# Patient Record
Sex: MALE | Race: OTHER | NOT HISPANIC OR LATINO | URBAN - METROPOLITAN AREA
[De-identification: names, ages, dates, MRNs, and addresses within clinical notes are randomized per-mention and may not be internally consistent; named-entity substitution may affect disease eponyms.]

---

## 2023-04-23 ENCOUNTER — EMERGENCY (EMERGENCY)
Facility: HOSPITAL | Age: 63
LOS: 1 days | End: 2023-04-23
Attending: EMERGENCY MEDICINE
Payer: MEDICAID

## 2023-04-23 ENCOUNTER — INPATIENT (INPATIENT)
Facility: HOSPITAL | Age: 63
LOS: 4 days | Discharge: HOME | DRG: 270 | End: 2023-04-28
Attending: STUDENT IN AN ORGANIZED HEALTH CARE EDUCATION/TRAINING PROGRAM | Admitting: INTERNAL MEDICINE
Payer: MEDICAID

## 2023-04-23 VITALS
OXYGEN SATURATION: 95 % | TEMPERATURE: 98 F | HEART RATE: 87 BPM | RESPIRATION RATE: 22 BRPM | WEIGHT: 134.04 LBS | HEIGHT: 66 IN

## 2023-04-23 VITALS
OXYGEN SATURATION: 99 % | WEIGHT: 139.99 LBS | RESPIRATION RATE: 16 BRPM | HEART RATE: 76 BPM | TEMPERATURE: 98 F | SYSTOLIC BLOOD PRESSURE: 128 MMHG | HEIGHT: 66 IN | DIASTOLIC BLOOD PRESSURE: 86 MMHG

## 2023-04-23 VITALS
RESPIRATION RATE: 18 BRPM | SYSTOLIC BLOOD PRESSURE: 145 MMHG | HEART RATE: 81 BPM | OXYGEN SATURATION: 100 % | DIASTOLIC BLOOD PRESSURE: 93 MMHG

## 2023-04-23 DIAGNOSIS — I25.10 ATHEROSCLEROTIC HEART DISEASE OF NATIVE CORONARY ARTERY WITHOUT ANGINA PECTORIS: ICD-10-CM

## 2023-04-23 DIAGNOSIS — I21.3 ST ELEVATION (STEMI) MYOCARDIAL INFARCTION OF UNSPECIFIED SITE: ICD-10-CM

## 2023-04-23 LAB
A1C WITH ESTIMATED AVERAGE GLUCOSE RESULT: 7.1 % — HIGH (ref 4–5.6)
ALBUMIN SERPL ELPH-MCNC: 4 G/DL — SIGNIFICANT CHANGE UP (ref 3.3–5)
ALBUMIN SERPL ELPH-MCNC: 4.2 G/DL — SIGNIFICANT CHANGE UP (ref 3.3–5)
ALP SERPL-CCNC: 81 U/L — SIGNIFICANT CHANGE UP (ref 40–120)
ALP SERPL-CCNC: 97 U/L — SIGNIFICANT CHANGE UP (ref 40–120)
ALT FLD-CCNC: 12 U/L — SIGNIFICANT CHANGE UP (ref 10–45)
ALT FLD-CCNC: 17 U/L — SIGNIFICANT CHANGE UP (ref 12–78)
ANION GAP SERPL CALC-SCNC: 12 MMOL/L — SIGNIFICANT CHANGE UP (ref 5–17)
ANION GAP SERPL CALC-SCNC: 7 MMOL/L — SIGNIFICANT CHANGE UP (ref 5–17)
APTT BLD: 34.4 SEC — SIGNIFICANT CHANGE UP (ref 27.5–35.5)
AST SERPL-CCNC: 112 U/L — HIGH (ref 10–40)
AST SERPL-CCNC: 36 U/L — SIGNIFICANT CHANGE UP (ref 15–37)
BASOPHILS # BLD AUTO: 0.05 K/UL — SIGNIFICANT CHANGE UP (ref 0–0.2)
BASOPHILS NFR BLD AUTO: 0.4 % — SIGNIFICANT CHANGE UP (ref 0–2)
BILIRUB SERPL-MCNC: 0.2 MG/DL — SIGNIFICANT CHANGE UP (ref 0.2–1.2)
BILIRUB SERPL-MCNC: 0.3 MG/DL — SIGNIFICANT CHANGE UP (ref 0.2–1.2)
BLD GP AB SCN SERPL QL: NEGATIVE — SIGNIFICANT CHANGE UP
BUN SERPL-MCNC: 12 MG/DL — SIGNIFICANT CHANGE UP (ref 7–23)
BUN SERPL-MCNC: 13 MG/DL — SIGNIFICANT CHANGE UP (ref 7–23)
CALCIUM SERPL-MCNC: 9.2 MG/DL — SIGNIFICANT CHANGE UP (ref 8.4–10.5)
CALCIUM SERPL-MCNC: 9.7 MG/DL — SIGNIFICANT CHANGE UP (ref 8.5–10.1)
CHLORIDE SERPL-SCNC: 102 MMOL/L — SIGNIFICANT CHANGE UP (ref 96–108)
CHLORIDE SERPL-SCNC: 105 MMOL/L — SIGNIFICANT CHANGE UP (ref 96–108)
CHOLEST SERPL-MCNC: 246 MG/DL — HIGH
CK MB BLD-MCNC: 5.9 % — HIGH (ref 0–3.5)
CK MB BLD-MCNC: 9 % — HIGH (ref 0–3.5)
CK MB BLD-MCNC: 9.7 % — HIGH (ref 0–3.5)
CK MB CFR SERPL CALC: 145.3 NG/ML — HIGH (ref 0–6.7)
CK MB CFR SERPL CALC: 151.1 NG/ML — HIGH (ref 0–6.7)
CK MB CFR SERPL CALC: 189.7 NG/ML — HIGH (ref 0–6.7)
CK SERPL-CCNC: 1498 U/L — HIGH (ref 30–200)
CK SERPL-CCNC: 2103 U/L — HIGH (ref 30–200)
CK SERPL-CCNC: 2544 U/L — HIGH (ref 30–200)
CO2 SERPL-SCNC: 20 MMOL/L — LOW (ref 22–31)
CO2 SERPL-SCNC: 24 MMOL/L — SIGNIFICANT CHANGE UP (ref 22–31)
CREAT SERPL-MCNC: 0.77 MG/DL — SIGNIFICANT CHANGE UP (ref 0.5–1.3)
CREAT SERPL-MCNC: 1.1 MG/DL — SIGNIFICANT CHANGE UP (ref 0.5–1.3)
EGFR: 102 ML/MIN/1.73M2 — SIGNIFICANT CHANGE UP
EGFR: 77 ML/MIN/1.73M2 — SIGNIFICANT CHANGE UP
EOSINOPHIL # BLD AUTO: 0.39 K/UL — SIGNIFICANT CHANGE UP (ref 0–0.5)
EOSINOPHIL NFR BLD AUTO: 3.3 % — SIGNIFICANT CHANGE UP (ref 0–6)
ESTIMATED AVERAGE GLUCOSE: 157 MG/DL — HIGH (ref 68–114)
GLUCOSE BLDC GLUCOMTR-MCNC: 140 MG/DL — HIGH (ref 70–99)
GLUCOSE BLDC GLUCOMTR-MCNC: 142 MG/DL — HIGH (ref 70–99)
GLUCOSE BLDC GLUCOMTR-MCNC: 147 MG/DL — HIGH (ref 70–99)
GLUCOSE SERPL-MCNC: 143 MG/DL — HIGH (ref 70–99)
GLUCOSE SERPL-MCNC: 235 MG/DL — HIGH (ref 70–99)
HCT VFR BLD CALC: 41.3 % — SIGNIFICANT CHANGE UP (ref 39–50)
HDLC SERPL-MCNC: 40 MG/DL — LOW
HGB BLD-MCNC: 13.8 G/DL — SIGNIFICANT CHANGE UP (ref 13–17)
IMM GRANULOCYTES NFR BLD AUTO: 0.3 % — SIGNIFICANT CHANGE UP (ref 0–0.9)
INR BLD: 0.97 RATIO — SIGNIFICANT CHANGE UP (ref 0.88–1.16)
LACTATE SERPL-SCNC: 1.4 MMOL/L — SIGNIFICANT CHANGE UP (ref 0.5–2)
LIDOCAIN IGE QN: 160 U/L — SIGNIFICANT CHANGE UP (ref 73–393)
LIPID PNL WITH DIRECT LDL SERPL: 179 MG/DL — HIGH
LYMPHOCYTES # BLD AUTO: 3.87 K/UL — HIGH (ref 1–3.3)
LYMPHOCYTES # BLD AUTO: 32.7 % — SIGNIFICANT CHANGE UP (ref 13–44)
MAGNESIUM SERPL-MCNC: 2 MG/DL — SIGNIFICANT CHANGE UP (ref 1.6–2.6)
MCHC RBC-ENTMCNC: 29.6 PG — SIGNIFICANT CHANGE UP (ref 27–34)
MCHC RBC-ENTMCNC: 33.4 GM/DL — SIGNIFICANT CHANGE UP (ref 32–36)
MCV RBC AUTO: 88.4 FL — SIGNIFICANT CHANGE UP (ref 80–100)
MONOCYTES # BLD AUTO: 0.97 K/UL — HIGH (ref 0–0.9)
MONOCYTES NFR BLD AUTO: 8.2 % — SIGNIFICANT CHANGE UP (ref 2–14)
NEUTROPHILS # BLD AUTO: 6.53 K/UL — SIGNIFICANT CHANGE UP (ref 1.8–7.4)
NEUTROPHILS NFR BLD AUTO: 55.1 % — SIGNIFICANT CHANGE UP (ref 43–77)
NON HDL CHOLESTEROL: 206 MG/DL — HIGH
NRBC # BLD: 0 /100 WBCS — SIGNIFICANT CHANGE UP (ref 0–0)
NT-PROBNP SERPL-SCNC: 233 PG/ML — HIGH (ref 0–125)
PHOSPHATE SERPL-MCNC: 3.4 MG/DL — SIGNIFICANT CHANGE UP (ref 2.5–4.5)
PLATELET # BLD AUTO: 293 K/UL — SIGNIFICANT CHANGE UP (ref 150–400)
POTASSIUM SERPL-MCNC: 3.4 MMOL/L — LOW (ref 3.5–5.3)
POTASSIUM SERPL-MCNC: 3.9 MMOL/L — SIGNIFICANT CHANGE UP (ref 3.5–5.3)
POTASSIUM SERPL-SCNC: 3.4 MMOL/L — LOW (ref 3.5–5.3)
POTASSIUM SERPL-SCNC: 3.9 MMOL/L — SIGNIFICANT CHANGE UP (ref 3.5–5.3)
PROT SERPL-MCNC: 7 G/DL — SIGNIFICANT CHANGE UP (ref 6–8.3)
PROT SERPL-MCNC: 8.2 G/DL — SIGNIFICANT CHANGE UP (ref 6–8.3)
PROTHROM AB SERPL-ACNC: 11.3 SEC — SIGNIFICANT CHANGE UP (ref 10.5–13.4)
RBC # BLD: 4.67 M/UL — SIGNIFICANT CHANGE UP (ref 4.2–5.8)
RBC # FLD: 12.9 % — SIGNIFICANT CHANGE UP (ref 10.3–14.5)
RH IG SCN BLD-IMP: POSITIVE — SIGNIFICANT CHANGE UP
SARS-COV-2 RNA SPEC QL NAA+PROBE: SIGNIFICANT CHANGE UP
SODIUM SERPL-SCNC: 133 MMOL/L — LOW (ref 135–145)
SODIUM SERPL-SCNC: 137 MMOL/L — SIGNIFICANT CHANGE UP (ref 135–145)
TRIGL SERPL-MCNC: 136 MG/DL — SIGNIFICANT CHANGE UP
TROPONIN I, HIGH SENSITIVITY RESULT: 1527 NG/L — HIGH
TROPONIN T, HIGH SENSITIVITY RESULT: 1061 NG/L — HIGH (ref 0–51)
TROPONIN T, HIGH SENSITIVITY RESULT: 1587 NG/L — HIGH (ref 0–51)
TROPONIN T, HIGH SENSITIVITY RESULT: 6523 NG/L — HIGH (ref 0–51)
TSH SERPL-MCNC: 2.56 UIU/ML — SIGNIFICANT CHANGE UP (ref 0.27–4.2)
UFH PPP CHRO-ACNC: 0.24 IU/ML — LOW (ref 0.3–0.7)
WBC # BLD: 11.85 K/UL — HIGH (ref 3.8–10.5)
WBC # FLD AUTO: 11.85 K/UL — HIGH (ref 3.8–10.5)

## 2023-04-23 PROCEDURE — 71045 X-RAY EXAM CHEST 1 VIEW: CPT | Mod: 26,77

## 2023-04-23 PROCEDURE — 96375 TX/PRO/DX INJ NEW DRUG ADDON: CPT

## 2023-04-23 PROCEDURE — 87635 SARS-COV-2 COVID-19 AMP PRB: CPT

## 2023-04-23 PROCEDURE — 92978 ENDOLUMINL IVUS OCT C 1ST: CPT | Mod: 26,LD,78

## 2023-04-23 PROCEDURE — 71045 X-RAY EXAM CHEST 1 VIEW: CPT

## 2023-04-23 PROCEDURE — 93010 ELECTROCARDIOGRAM REPORT: CPT

## 2023-04-23 PROCEDURE — 92941 PRQ TRLML REVSC TOT OCCL AMI: CPT | Mod: LD,78

## 2023-04-23 PROCEDURE — 93306 TTE W/DOPPLER COMPLETE: CPT | Mod: 26

## 2023-04-23 PROCEDURE — 93880 EXTRACRANIAL BILAT STUDY: CPT | Mod: 26

## 2023-04-23 PROCEDURE — 99223 1ST HOSP IP/OBS HIGH 75: CPT | Mod: GC

## 2023-04-23 PROCEDURE — 93458 L HRT ARTERY/VENTRICLE ANGIO: CPT | Mod: 26

## 2023-04-23 PROCEDURE — 33967 INSERT I-AORT PERCUT DEVICE: CPT

## 2023-04-23 PROCEDURE — 99285 EMERGENCY DEPT VISIT HI MDM: CPT | Mod: 25

## 2023-04-23 PROCEDURE — 36415 COLL VENOUS BLD VENIPUNCTURE: CPT

## 2023-04-23 PROCEDURE — 96374 THER/PROPH/DIAG INJ IV PUSH: CPT

## 2023-04-23 PROCEDURE — 84484 ASSAY OF TROPONIN QUANT: CPT

## 2023-04-23 PROCEDURE — 71045 X-RAY EXAM CHEST 1 VIEW: CPT | Mod: 26

## 2023-04-23 PROCEDURE — 85025 COMPLETE CBC W/AUTO DIFF WBC: CPT

## 2023-04-23 PROCEDURE — 99291 CRITICAL CARE FIRST HOUR: CPT

## 2023-04-23 PROCEDURE — 85730 THROMBOPLASTIN TIME PARTIAL: CPT

## 2023-04-23 PROCEDURE — 93567 NJX CAR CTH SPRVLV AORTGRPHY: CPT

## 2023-04-23 PROCEDURE — 99252 IP/OBS CONSLTJ NEW/EST SF 35: CPT

## 2023-04-23 PROCEDURE — 93005 ELECTROCARDIOGRAM TRACING: CPT

## 2023-04-23 PROCEDURE — 80053 COMPREHEN METABOLIC PANEL: CPT

## 2023-04-23 PROCEDURE — 99292 CRITICAL CARE ADDL 30 MIN: CPT

## 2023-04-23 PROCEDURE — 83880 ASSAY OF NATRIURETIC PEPTIDE: CPT

## 2023-04-23 PROCEDURE — 83690 ASSAY OF LIPASE: CPT

## 2023-04-23 PROCEDURE — 99053 MED SERV 10PM-8AM 24 HR FAC: CPT

## 2023-04-23 PROCEDURE — 85610 PROTHROMBIN TIME: CPT

## 2023-04-23 RX ORDER — ASPIRIN/CALCIUM CARB/MAGNESIUM 324 MG
81 TABLET ORAL DAILY
Refills: 0 | Status: DISCONTINUED | OUTPATIENT
Start: 2023-04-24 | End: 2023-04-26

## 2023-04-23 RX ORDER — MORPHINE SULFATE 50 MG/1
4 CAPSULE, EXTENDED RELEASE ORAL ONCE
Refills: 0 | Status: DISCONTINUED | OUTPATIENT
Start: 2023-04-23 | End: 2023-04-23

## 2023-04-23 RX ORDER — TICAGRELOR 90 MG/1
90 TABLET ORAL EVERY 12 HOURS
Refills: 0 | Status: DISCONTINUED | OUTPATIENT
Start: 2023-04-24 | End: 2023-04-27

## 2023-04-23 RX ORDER — SODIUM CHLORIDE 9 MG/ML
1000 INJECTION INTRAMUSCULAR; INTRAVENOUS; SUBCUTANEOUS ONCE
Refills: 0 | Status: COMPLETED | OUTPATIENT
Start: 2023-04-23 | End: 2023-04-23

## 2023-04-23 RX ORDER — HYDROMORPHONE HYDROCHLORIDE 2 MG/ML
0.5 INJECTION INTRAMUSCULAR; INTRAVENOUS; SUBCUTANEOUS ONCE
Refills: 0 | Status: DISCONTINUED | OUTPATIENT
Start: 2023-04-23 | End: 2023-04-23

## 2023-04-23 RX ORDER — FENTANYL CITRATE 50 UG/ML
25 INJECTION INTRAVENOUS ONCE
Refills: 0 | Status: DISCONTINUED | OUTPATIENT
Start: 2023-04-23 | End: 2023-04-23

## 2023-04-23 RX ORDER — SODIUM CHLORIDE 9 MG/ML
3 INJECTION INTRAMUSCULAR; INTRAVENOUS; SUBCUTANEOUS EVERY 8 HOURS
Refills: 0 | Status: DISCONTINUED | OUTPATIENT
Start: 2023-04-23 | End: 2023-04-25

## 2023-04-23 RX ORDER — CHLORHEXIDINE GLUCONATE 213 G/1000ML
1 SOLUTION TOPICAL
Refills: 0 | Status: DISCONTINUED | OUTPATIENT
Start: 2023-04-23 | End: 2023-04-25

## 2023-04-23 RX ORDER — INSULIN LISPRO 100/ML
VIAL (ML) SUBCUTANEOUS AT BEDTIME
Refills: 0 | Status: DISCONTINUED | OUTPATIENT
Start: 2023-04-23 | End: 2023-04-28

## 2023-04-23 RX ORDER — DEXTROSE 50 % IN WATER 50 %
25 SYRINGE (ML) INTRAVENOUS ONCE
Refills: 0 | Status: DISCONTINUED | OUTPATIENT
Start: 2023-04-23 | End: 2023-04-23

## 2023-04-23 RX ORDER — ASPIRIN/CALCIUM CARB/MAGNESIUM 324 MG
162 TABLET ORAL ONCE
Refills: 0 | Status: COMPLETED | OUTPATIENT
Start: 2023-04-23 | End: 2023-04-23

## 2023-04-23 RX ORDER — BNT162B2 ORIGINAL AND OMICRON BA.4/BA.5 .1125; .1125 MG/2.25ML; MG/2.25ML
0.3 INJECTION, SUSPENSION INTRAMUSCULAR ONCE
Refills: 0 | Status: DISCONTINUED | OUTPATIENT
Start: 2023-04-23 | End: 2023-04-28

## 2023-04-23 RX ORDER — NITROGLYCERIN 6.5 MG
5 CAPSULE, EXTENDED RELEASE ORAL
Qty: 50 | Refills: 0 | Status: DISCONTINUED | OUTPATIENT
Start: 2023-04-23 | End: 2023-04-23

## 2023-04-23 RX ORDER — POTASSIUM CHLORIDE 20 MEQ
10 PACKET (EA) ORAL ONCE
Refills: 0 | Status: COMPLETED | OUTPATIENT
Start: 2023-04-23 | End: 2023-04-23

## 2023-04-23 RX ORDER — EPTIFIBATIDE 2 MG/ML
2 INJECTION, SOLUTION INTRAVENOUS
Qty: 75 | Refills: 0 | Status: DISCONTINUED | OUTPATIENT
Start: 2023-04-23 | End: 2023-04-24

## 2023-04-23 RX ORDER — METOPROLOL TARTRATE 50 MG
12.5 TABLET ORAL
Refills: 0 | Status: DISCONTINUED | OUTPATIENT
Start: 2023-04-23 | End: 2023-04-28

## 2023-04-23 RX ORDER — HEPARIN SODIUM 5000 [USP'U]/ML
3800 INJECTION INTRAVENOUS; SUBCUTANEOUS ONCE
Refills: 0 | Status: COMPLETED | OUTPATIENT
Start: 2023-04-23 | End: 2023-04-23

## 2023-04-23 RX ORDER — ONDANSETRON 8 MG/1
4 TABLET, FILM COATED ORAL ONCE
Refills: 0 | Status: COMPLETED | OUTPATIENT
Start: 2023-04-23 | End: 2023-04-23

## 2023-04-23 RX ORDER — TICAGRELOR 90 MG/1
180 TABLET ORAL ONCE
Refills: 0 | Status: COMPLETED | OUTPATIENT
Start: 2023-04-23 | End: 2023-04-23

## 2023-04-23 RX ORDER — DEXTROSE 50 % IN WATER 50 %
12.5 SYRINGE (ML) INTRAVENOUS ONCE
Refills: 0 | Status: DISCONTINUED | OUTPATIENT
Start: 2023-04-23 | End: 2023-04-23

## 2023-04-23 RX ORDER — HEPARIN SODIUM 5000 [USP'U]/ML
INJECTION INTRAVENOUS; SUBCUTANEOUS
Qty: 25000 | Refills: 0 | Status: DISCONTINUED | OUTPATIENT
Start: 2023-04-23 | End: 2023-04-26

## 2023-04-23 RX ORDER — ATORVASTATIN CALCIUM 80 MG/1
80 TABLET, FILM COATED ORAL AT BEDTIME
Refills: 0 | Status: DISCONTINUED | OUTPATIENT
Start: 2023-04-23 | End: 2023-04-28

## 2023-04-23 RX ORDER — INSULIN LISPRO 100/ML
VIAL (ML) SUBCUTANEOUS
Refills: 0 | Status: DISCONTINUED | OUTPATIENT
Start: 2023-04-23 | End: 2023-04-28

## 2023-04-23 RX ORDER — HEPARIN SODIUM 5000 [USP'U]/ML
750 INJECTION INTRAVENOUS; SUBCUTANEOUS
Qty: 25000 | Refills: 0 | Status: DISCONTINUED | OUTPATIENT
Start: 2023-04-23 | End: 2023-04-23

## 2023-04-23 RX ADMIN — Medication 12.5 MILLIGRAM(S): at 13:43

## 2023-04-23 RX ADMIN — FENTANYL CITRATE 25 MICROGRAM(S): 50 INJECTION INTRAVENOUS at 17:59

## 2023-04-23 RX ADMIN — SODIUM CHLORIDE 1000 MILLILITER(S): 9 INJECTION INTRAMUSCULAR; INTRAVENOUS; SUBCUTANEOUS at 03:50

## 2023-04-23 RX ADMIN — MORPHINE SULFATE 4 MILLIGRAM(S): 50 CAPSULE, EXTENDED RELEASE ORAL at 04:15

## 2023-04-23 RX ADMIN — TICAGRELOR 180 MILLIGRAM(S): 90 TABLET ORAL at 04:05

## 2023-04-23 RX ADMIN — HYDROMORPHONE HYDROCHLORIDE 0.5 MILLIGRAM(S): 2 INJECTION INTRAMUSCULAR; INTRAVENOUS; SUBCUTANEOUS at 04:17

## 2023-04-23 RX ADMIN — HEPARIN SODIUM 750 UNIT(S)/HR: 5000 INJECTION INTRAVENOUS; SUBCUTANEOUS at 04:13

## 2023-04-23 RX ADMIN — MORPHINE SULFATE 4 MILLIGRAM(S): 50 CAPSULE, EXTENDED RELEASE ORAL at 04:00

## 2023-04-23 RX ADMIN — CHLORHEXIDINE GLUCONATE 1 APPLICATION(S): 213 SOLUTION TOPICAL at 21:17

## 2023-04-23 RX ADMIN — Medication 12.5 MILLIGRAM(S): at 21:16

## 2023-04-23 RX ADMIN — ONDANSETRON 4 MILLIGRAM(S): 8 TABLET, FILM COATED ORAL at 03:45

## 2023-04-23 RX ADMIN — EPTIFIBATIDE 9.73 MICROGRAM(S)/KG/MIN: 2 INJECTION, SOLUTION INTRAVENOUS at 21:17

## 2023-04-23 RX ADMIN — HEPARIN SODIUM 3800 UNIT(S): 5000 INJECTION INTRAVENOUS; SUBCUTANEOUS at 04:13

## 2023-04-23 RX ADMIN — HEPARIN SODIUM 7.5 UNIT(S)/HR: 5000 INJECTION INTRAVENOUS; SUBCUTANEOUS at 09:53

## 2023-04-23 RX ADMIN — SODIUM CHLORIDE 3 MILLILITER(S): 9 INJECTION INTRAMUSCULAR; INTRAVENOUS; SUBCUTANEOUS at 06:32

## 2023-04-23 RX ADMIN — Medication 162 MILLIGRAM(S): at 03:45

## 2023-04-23 RX ADMIN — Medication 1.5 MICROGRAM(S)/MIN: at 15:57

## 2023-04-23 RX ADMIN — Medication 10 MILLIEQUIVALENT(S): at 13:43

## 2023-04-23 RX ADMIN — FENTANYL CITRATE 25 MICROGRAM(S): 50 INJECTION INTRAVENOUS at 18:15

## 2023-04-23 RX ADMIN — MORPHINE SULFATE 4 MILLIGRAM(S): 50 CAPSULE, EXTENDED RELEASE ORAL at 03:45

## 2023-04-23 RX ADMIN — ATORVASTATIN CALCIUM 80 MILLIGRAM(S): 80 TABLET, FILM COATED ORAL at 21:17

## 2023-04-23 NOTE — CONSULT NOTE ADULT - ASSESSMENT
62M NKDA PMH of CAD with 1 stent in 2013 to LAD and DM2, HLD  presented to [facility] after experiencing intermittent substernal chest pain starting at noon on 4/22/23 that he described as a squeezing type pain that would be alleviated with rest.   Was able to go to work in the afternoon (patient works at [employer]) and completed his shift with some chest pain throughout.  When he arrived home around midnight his pain began to worsen without relief at which point he had his wife take him to [facility].  In ED found with STEMI with KASEY V1,V2,V3 with +CE. ASA and Brilinta loaded, started on heparin gtt and transferred to [facility] for LHC.  Cath from this morning revealed severe triple vessel disease (OM1, pLAD, and Cx disease) with vgram 20-25%. IABP placed and patient admitted to CICU.   Currently patient is chest pain free.  No shortness of breath.  No abdominal pain/nausea vomiting.  No numbness/tingling/headache.    Has no metallic implants/PPM/AICD.  He denies hx of chemo/radiation to sternum.   Has not had LE vein surgery/stripping/intervention for varicose veins.    Given cath findings in setting of STEMI CT Surgery Consult /w Dr. Garcia was placed.  CABG evaluation now in progress.  Will obtain pre-op CABG labwork/imaging/diagnostics.        -Preop CABG workup in progress.  Maintain IABP   TTE/Carotid Dopplers ordered/PFT's ordered    Continue Heparin gtt  Patient was Brilinta loaded would hold further P2y12 inhibitors and check level  Would obtain CBC/CMP/Coags/T&Sx2 /TFT's/MRSA PCR and UA  Continue Statin   Continue ASA 81mg   Dr. Garcia to review all imaging.  OR date to be determined   Continue rest of care per CICU

## 2023-04-23 NOTE — H&P ADULT - NSHPPHYSICALEXAM_GEN_ALL_CORE
PHYSICAL EXAM:  Constitutional: Patient laying in bed, NAD  Head: Atraumatic, normocephalic  Eyes: No scleral icterus. PERRLA. EOMI  ENMT: Moist mucous membrane. Uvula midline  Neck: Supple, No JVD  Respiratory: CTA B/L. No wheezes, rales or rhonchi   Cardiovascular: S1/S2. No murmurs, rubs, or gallops.  Gastrointestinal: Nondistended, BSx4, soft, nontender.  Extremities: Moves all extremities. Warm, no edema, nontender. R IABP dressing c/d/i, soft and nontender surrounding   Vascular: 2+ DP/PT pulses B/L   Neurological: A&Ox3. Follows commands. No focal deficits.   Skin: No rashes  on exposed skin

## 2023-04-23 NOTE — ED PROVIDER NOTE - PHYSICAL EXAMINATION
Gen: Alert, appears to be uncomfortable  Head/eyes: NC/AT, PERRL  ENT: airway patent  Neck: supple  Pulm/lung: Bilateral clear BS  CV/heart: RRR  GI/Abd: soft, NT/ND, +BS, no guarding/rebound tenderness  Musculoskeletal: no edema/erythema/cyanosis  Skin: no rash  Neuro: AAOx3, grossly intact

## 2023-04-23 NOTE — PROVIDER CONTACT NOTE (CRITICAL VALUE NOTIFICATION) - PERSON GIVING RESULT:
-Push fluids  -Cool mist humidifier at night  -Flonase  -Mucinex  -Chloraseptic spray OUTC  -GO to ER with worsening symptoms.       Self-Care for Sore Throats    Sore throats happen for many reasons, such as colds, allergies, and infections caused by virus or bacteria from spreading. · Don’t strain your vocal cords.   Call your healthcare provider  Contact your healthcare provider if you have:  · A temperature over 101°F (38.3°C)  · White spots on the throat  · Great difficulty swallowing  · Trouble breathin Laboratory- [facility]

## 2023-04-23 NOTE — H&P ADULT - HISTORY OF PRESENT ILLNESS
62M PMH of CAD with 1 stent in 2013 to LAD and DM2 p/w chest pain since noon has been getting progressively worse tonight took 2 baby aspirin's around midnight.  Pain is nonradiating severe.  Denies any shortness of breath fever chills upper respiratory symptoms. In ED found with STEMI with KASEY V1,V2,V3 with +CE. ASA and Brilinta loaded, started on heparin gtt and transferred to [facility] for LHC.    In Cath lab found with severe triple vessel disease (OM1, pLAD, and Cx disease) with vgram 20-25%. IABP placed.    62M PMH of CAD with 1 stent in 2013 to LAD and DM2 p/w intermittent chest pain which was alleviated by resting and sitting down since noon. States CP has been getting progressively worse around midnight which became persistent, patient took 2 baby ASA, and went to the ED when unable to sleep 2/2 pain. States the pain was 9/10, described as squeezing substernal CP.  Denies any shortness of breath fever chills upper respiratory symptoms. In ED found with STEMI with KASEY V1,V2,V3 with +CE. ASA and Brilinta loaded, started on heparin gtt and transferred to [facility] for LHC.    In Cath lab found with severe triple vessel disease (OM1, pLAD, and Cx disease) with vgram 20-25%. IABP placed. States now CP improved, mild chest pressure 1/10.    62M PMH of CAD with 1 stent in 2013 to LAD and DM2 p/w intermittent chest pain which was alleviated by resting and sitting down since noon. States CP has been getting progressively worse around midnight which became persistent, patient took 2 baby ASA, and went to the ED when unable to sleep 2/2 pain. States the pain was 9/10, described as squeezing substernal CP.  Denies any shortness of breath fever chills upper respiratory symptoms. In ED found with STEMI with KASEY V1,V2,V3 with +CE. ASA and Brilinta loaded, started on heparin gtt and transferred to [facility] for LHC.    In Cath lab found with severe triple vessel disease (OM1, pLAD, and Cx disease) with vgram 20-25%. IABP placed. States now CP improved, mild chest pressure 1/10.     Of note patient states has not seen physician in a few years. Recently stopped his simvastatin 1 week ago himself in which prior he was intermittently taking 20mg.

## 2023-04-23 NOTE — ED PROVIDER NOTE - CLINICAL SUMMARY MEDICAL DECISION MAKING FREE TEXT BOX
62-year-old male history of CAD with 1 stent complaining about chest pain since 12 noon has been getting progressively worse tonight took 2 baby aspirin's around midnight.  Pain is nonradiating severe.  Denies any shortness of breath fever chills upper respiratory symptoms.    STEMI on ekg, labs, ASA, brillinta, heparin loading, transfer to [facility] for immediate cath

## 2023-04-23 NOTE — H&P ADULT - NSHPSOCIALHISTORY_GEN_ALL_CORE
Patient lives at home with wife and children. Works as a . No issues with ADLs at home. Denies any ETOH consumption or drug use. States currently smokes 4-5 cigs as day for last 25 years.

## 2023-04-23 NOTE — ED PROVIDER NOTE - OBJECTIVE STATEMENT
62-year-old male history of CAD with 1 stent complaining about chest pain since 12 noon has been getting progressively worse tonight took 2 baby aspirin's around midnight.  Pain is nonradiating severe.  Denies any shortness of breath fever chills upper respiratory symptoms.

## 2023-04-23 NOTE — ED ADULT NURSE NOTE - OBJECTIVE STATEMENT
Patient came in to ED c/o chest pain started at 1pm yesterday and gotten worse tonight. Patient states he took 2 baby aspirin around 1 AM.

## 2023-04-23 NOTE — H&P ADULT - CRITICAL CARE ATTENDING COMMENT
Given cath findings in setting of STEMI CT Surgery Consult /w Dr. Garcia was placed.    IABP for coronary perfusion  CABG evaluation now in progress.    Will obtain pre-op CABG labwork/imaging/diagnostics.      Maintain IABP - c/w hep gtt  TTE is pending  Carotid Dopplers ordered/PFT's ordered    Patient was Brilinta loaded would hold further P2y12 inhibitors and check level  Would obtain CBC/CMP/Coags/T&Sx2 /TFT's/MRSA PCR and UA  Continue Statin   Continue ASA 81mg   Dr. Garcia to review all imaging.  OR date to be determined     Patient is critically ill, requiring critical care services. Despite the current condition, the patient is at a high risk of clinical and hemodynamic demise Given cath findings in setting of STEMI CT Surgery Consult /w Dr. Garcia was placed.    IABP for coronary perfusion  CABG evaluation now in progress.    Will obtain pre-op CABG lab work/imaging/diagnostics.      Maintain IABP - c/w hep gtt  TTE is pending  Carotid Dopplers ordered/PFT's ordered    Patient was Brilinta loaded would hold further P2y12 inhibitors and check level  Would obtain CBC/CMP/Coags/T&Sx2 /TFT's/MRSA PCR and UA  Continue Statin   Continue ASA 81mg   Dr. Garcia to review all imaging.  OR date to be determined - pt does not want CABG    Patient is critically ill, requiring critical care services. Despite the current condition, the patient is at a high risk of clinical and hemodynamic demise

## 2023-04-23 NOTE — H&P ADULT - NSHPREVIEWOFSYSTEMS_GEN_ALL_CORE
REVIEW OF SYSTEMS:    CONSTITUTIONAL: No weakness, fevers or chills  EYES/ENT: No visual changes;  No vertigo or throat pain   NECK: No pain or stiffness  RESPIRATORY: No cough, wheezing, hemoptysis; No shortness of breath  CARDIOVASCULAR: SEE HPI. Currently No chest pain or palpitations  GASTROINTESTINAL: No abdominal or epigastric pain. No nausea, vomiting, or hematemesis; No diarrhea or constipation. No melena or hematochezia.  GENITOURINARY: No dysuria, frequency or hematuria  NEUROLOGICAL: No numbness or weakness  SKIN: No itching, burning, rashes, or lesions   All other review of systems is negative unless indicated above.

## 2023-04-23 NOTE — ED PROVIDER NOTE - PROGRESS NOTE DETAILS
Discussed case with interventional cardiology fellow Dr. Rivera .Dr. Jerson fagan will take straight to Cath Lab recommend 180 mg Brilinta and heparin drip and bolus.

## 2023-04-23 NOTE — CONSULT NOTE ADULT - NS ATTEND AMEND GEN_ALL_CORE FT
Mr. Ramirez is a 62 year old male with known history of CAD and prior stenting who presented to an OSH early this AM with CP since 12pm yesterday.  He received Brilinta load there and was transferred here and underwent cardiac catheterization which demonstrated an extremely tight LAD lesion proximal to his prior stent and additional lesions in the left circumflex.  He was chest pain free at the time and an IABP was placed given his high risk anatomy and taken to the CICU for possible CABG evaluation.  His EF on echo is approximately 30% although his apex and and lateral lateral wall are largely akinetic with swirling on Definity images.  Upon revisiting the patient later today, he reports that he had been pain free since transfer from [facility] but now endorsed some chest burning and tightness.  I requested repeat troponins which are uptrending and concerning for ongoing ischemia.  While he is increased risk for bypass surgery given his LV dysfunction and recent plavix load, he has reasonable distal coronary targets and appears to have ongoing ischemia so revascularization at this time would be beneficial and emergent CABG was offered to the patient.  I discussed this with the patient, his brother and his wife and the patient was not interested in pursuing surgery and would prefer stenting despite understanding that it was also a complex and high risk stenting given the location of the lesion.  I discussed this with Dr. Arthur and Dr. Irving and Dr. Arthur will follow-up with patient regarding his stent options.

## 2023-04-23 NOTE — PROVIDER CONTACT NOTE (CHANGE IN STATUS NOTIFICATION) - RECOMMENDATIONS
Titrate nitro gtt per protocol to pain. Wait for cath lab to call for report to take patient down for high risk PCI
Continue nitro gtt as tolerated. Give fentanyl for increased pain. Waiting for cath lab to call for report to take patient down for high risk PCI
Start nitro gtt, initiate cath lab for high risk PCI

## 2023-04-23 NOTE — CONSULT NOTE ADULT - PROBLEM SELECTOR RECOMMENDATION 9
+ STEMI, cath this morning /w severe triple vessel disease (OM1, pLAD, and Cx disease) with vgram 20-25%.    -Preop CABG workup in progress.  Maintain IABP   TTE/Carotid Dopplers ordered/PFT's ordered    Continue Heparin gtt  Patient was Brilinta loaded would hold further P2y12 inhibitors and check level  Would obtain CBC/CMP/Coags/T&Sx2 /TFT's/MRSA PCR and UA  Continue Statin   Continue ASA 81mg   Dr. Garcia to review all imaging.  OR date to be determined   Continue rest of care per CICU

## 2023-04-23 NOTE — PROVIDER CONTACT NOTE (CHANGE IN STATUS NOTIFICATION) - SITUATION
Pt has unrelieved 8/10 CP
Pt reporting chest pain
Pt complaining of constant 7/10 chest pain unrelieved by nitro gtt or IABP management

## 2023-04-23 NOTE — PATIENT PROFILE ADULT - FALL HARM RISK - RISK INTERVENTIONS

## 2023-04-23 NOTE — CONSULT NOTE ADULT - SUBJECTIVE AND OBJECTIVE BOX
HPI:  62M NKDA PMH of CAD with 1 stent in 2013 to LAD and DM2, HLD  presented to [place] ED after experiencing intermittent substernal chest pain starting at noon on 4/22/23 that he described as a squeezing type pain that would be alleviated with rest.   Was able to go to work in the afternoon (patient works at [employer]) and completed his shift with some chest pain throughout.  When he arrived home around midnight his pain began to worsen without relief at which point he had his wife take him to [place].  In ED found with STEMI with KASEY V1,V2,V3 with +CE. ASA and Brilinta loaded, started on heparin gtt and transferred to [facility] for LHC.  Cath from this morning revealed severe triple vessel disease (OM1, pLAD, and Cx disease) with vgram 20-25%. IABP placed and patient admitted to CICU.   Currently patient is chest pain free.  No shortness of breath.  No abdominal pain/nausea vomiting.  No numbness/tingling/headache.    Has no metallic implants/PPM/AICD.  He denies hx of chemo/radiation to sternum.   Has not had LE vein surgery/stripping/intervention for varicose veins.       Past Medical History  CAD (coronary artery disease)    Diabetes      Past Surgical History  No significant past surgical history      MEDICATIONS  (STANDING):  atorvastatin 80 milliGRAM(s) Oral at bedtime  chlorhexidine 4% Liquid 1 Application(s) Topical <User Schedule>  coronavirus bivalent (EUA) Booster Vaccine (PFIZER) 0.3 milliLiter(s) IntraMuscular once  dextrose 50% Injectable 12.5 Gram(s) IV Push once  dextrose 50% Injectable 25 Gram(s) IV Push once  dextrose 50% Injectable 25 Gram(s) IV Push once  insulin lispro (ADMELOG) corrective regimen sliding scale   SubCutaneous at bedtime  insulin lispro (ADMELOG) corrective regimen sliding scale   SubCutaneous three times a day before meals  sodium chloride 0.9% lock flush 3 milliLiter(s) IV Push every 8 hours    MEDICATIONS  (PRN):      Vital Signs Last 24 Hrs  T(C): 36.6 (04-23-23 @ 05:45), Max: 36.6 (04-23-23 @ 05:45)  T(F): 97.9 (04-23-23 @ 05:45), Max: 97.9 (04-23-23 @ 05:45)  HR: 87 (04-23-23 @ 06:00) (76 - 87)  BP: 145/93 (04-23-23 @ 04:10) (127/83 - 145/93)  RR: 23 (04-23-23 @ 06:00) (16 - 23)  SpO2: 96% (04-23-23 @ 06:00) (95% - 100%)           Daily Height in cm: 167.64 (23 Apr 2023 05:45)    Daily   Admit Wt: Drug Dosing Weight  Height (cm): 167.6 (23 Apr 2023 05:45)  Weight (kg): 60.8 (23 Apr 2023 05:45)  BMI (kg/m2): 21.6 (23 Apr 2023 05:45)  BSA (m2): 1.69 (23 Apr 2023 05:45)    Allergies: No Known Allergies      SOCIAL HISTORY:  Smoker: [ ] Yes  [X] No   Former smoker   ETOH use: [ ] Yes  [X] No             Pt denies  Ilicit Drug use:  [ ] Yes  [X] No     Pt denies    FAMILY HISTORY:  FH: CAD (coronary artery disease) (Father)    FH: type 2 diabetes (Mother, Sibling)        Review of Systems  GENERAL:  no weakness, fatigue, fevers or chills  NEURO: no dizziness, numbness, tingling or weakness  SKIN: no itching, burning, rashes, or lesions   HEENT: no visual changes;  no headache, no vertigo, no recent colds  RESPIRATORY: no shortness of breath, no cough, sputum, wheezing  CARDIOVASCULAR:  +intermittent substernal chest pain currently chest pain free  GI: no abd pain. no N/V/D.  PERIPHERAL VASCULAR: no swelling, no tenderness, no erythema    PHYSICAL EXAM  Tele NSR 90s   General: Well nourished, well developed, NAD.                                              Neuro: Normal exam oriented to person/place & time with no focal motor or sensory  deficits.                    Eyes: Normal exam of conjunctiva & lids, pupils equally reactive.   ENT: Normal exam of nasal/oral mucosa with absence of cyanosis.   Neck: Normal exam of jugular veins, trachea & thyroid.   Chest: Normal lung exam with good air movement absence of wheezes, rales, or rhonchi.                                                                         CV:  Auscultation: normal S1S2, RRR   Carotids: No Bruits[X]  Abdominal Aorta: normal [X] nonpalpable[X]                                                                         GI: Normal exam of abdomen with no noted masses or tenderness. +BSx4Q                                                                                            Extremities: Normal no evidence of cyanosis or deformity, Edema: none  Lower Extremity Pulses: Right femoral IABP in place.  No s/s bleeding/hematoma at sheath/insertion site. Right[+2DP] Left[+2DP] Varicosities[none]  SKIN : Normal exam to inspection & palpation.                                                           LABS:                        13.8   11.85 )-----------( 293      ( 23 Apr 2023 03:40 )             41.3     04-23    133<L>  |  102  |  13  ----------------------------<  235<H>  3.4<L>   |  24  |  1.10    Ca    9.7      23 Apr 2023 03:40    TPro  8.2  /  Alb  4.0  /  TBili  0.2  /  DBili  x   /  AST  36  /  ALT  17  /  AlkPhos  97  04-23    PT/INR - ( 23 Apr 2023 03:40 )   PT: 11.3 sec;   INR: 0.97 ratio         PTT - ( 23 Apr 2023 03:40 )  PTT:34.4 sec      Cardiac Cath:    TTE / ALEJANDRA:   HPI:  62M NKDA PMH of CAD with 1 stent in  to LAD and DM2, HLD  presented to [place] ED after experiencing intermittent substernal chest pain starting at noon on 23 that he described as a squeezing type pain that would be alleviated with rest.   Was able to go to work in the afternoon (patient works at [employer]) and completed his shift with some chest pain throughout.  When he arrived home around midnight his pain began to worsen without relief at which point he had his wife take him to [place].  In ED found with STEMI with KASEY V1,V2,V3 with +CE. ASA and Brilinta loaded, started on heparin gtt and transferred to [facility] for LHC.  Cath from this morning revealed severe triple vessel disease (OM1, pLAD, and Cx disease) with vgram 20-25%. IABP placed and patient admitted to CICU.   Currently patient is chest pain free.  No shortness of breath.  No abdominal pain/nausea vomiting.  No numbness/tingling/headache.    Has no metallic implants/PPM/AICD.  He denies hx of chemo/radiation to sternum.   Has not had LE vein surgery/stripping/intervention for varicose veins.       Past Medical History  CAD (coronary artery disease)    Diabetes      Past Surgical History  No significant past surgical history      MEDICATIONS  (STANDING):  atorvastatin 80 milliGRAM(s) Oral at bedtime  chlorhexidine 4% Liquid 1 Application(s) Topical <User Schedule>  coronavirus bivalent (EUA) Booster Vaccine (PFIZER) 0.3 milliLiter(s) IntraMuscular once  dextrose 50% Injectable 12.5 Gram(s) IV Push once  dextrose 50% Injectable 25 Gram(s) IV Push once  dextrose 50% Injectable 25 Gram(s) IV Push once  insulin lispro (ADMELOG) corrective regimen sliding scale   SubCutaneous at bedtime  insulin lispro (ADMELOG) corrective regimen sliding scale   SubCutaneous three times a day before meals  sodium chloride 0.9% lock flush 3 milliLiter(s) IV Push every 8 hours    MEDICATIONS  (PRN):      Vital Signs Last 24 Hrs  T(C): 36.6 (23 @ 05:45), Max: 36.6 (23 @ 05:45)  T(F): 97.9 (23 @ 05:45), Max: 97.9 (23 @ 05:45)  HR: 87 (23 @ 06:00) (76 - 87)  BP: 145/93 (23 @ 04:10) (127/83 - 145/93)  RR: 23 (23 @ 06:00) (16 - 23)  SpO2: 96% (23 @ 06:00) (95% - 100%)           Daily Height in cm: 167.64 (2023 05:45)    Daily   Admit Wt: Drug Dosing Weight  Height (cm): 167.6 (2023 05:45)  Weight (kg): 60.8 (2023 05:45)  BMI (kg/m2): 21.6 (2023 05:45)  BSA (m2): 1.69 (2023 05:45)    Allergies: No Known Allergies      SOCIAL HISTORY:  Smoker: [ ] Yes  [X] No   Former smoker   ETOH use: [ ] Yes  [X] No             Pt denies  Ilicit Drug use:  [ ] Yes  [X] No     Pt denies    FAMILY HISTORY:  FH: CAD (coronary artery disease) (Father)    FH: type 2 diabetes (Mother, Sibling)        Review of Systems  GENERAL:  no weakness, fatigue, fevers or chills  NEURO: no dizziness, numbness, tingling or weakness  SKIN: no itching, burning, rashes, or lesions   HEENT: no visual changes;  no headache, no vertigo, no recent colds  RESPIRATORY: no shortness of breath, no cough, sputum, wheezing  CARDIOVASCULAR:  +intermittent substernal chest pain currently chest pain free  GI: no abd pain. no N/V/D.  PERIPHERAL VASCULAR: no swelling, no tenderness, no erythema    PHYSICAL EXAM  Tele NSR 90s   General: Well nourished, well developed, NAD.                                              Neuro: Normal exam oriented to person/place & time with no focal motor or sensory  deficits.                    Eyes: Normal exam of conjunctiva & lids, pupils equally reactive.   ENT: Normal exam of nasal/oral mucosa with absence of cyanosis.   Neck: Normal exam of jugular veins, trachea & thyroid.   Chest: Normal lung exam with good air movement absence of wheezes, rales, or rhonchi.                                                                         CV:  Auscultation: normal S1S2, RRR   Carotids: No Bruits[X]  Abdominal Aorta: normal [X] nonpalpable[X]                                                                         GI: Normal exam of abdomen with no noted masses or tenderness. +BSx4Q                                                                                            Extremities: Normal no evidence of cyanosis or deformity, Edema: none  Lower Extremity Pulses: Right femoral IABP in place.  No s/s bleeding/hematoma at sheath/insertion site. Right[+2DP] Left[+2DP] Varicosities[none]  SKIN : Normal exam to inspection & palpation.                                                           LABS:                        13.8   11.85 )-----------( 293      ( 2023 03:40 )             41.3     04-    133<L>  |  102  |  13  ----------------------------<  235<H>  3.4<L>   |  24  |  1.10    Ca    9.7      2023 03:40    TPro  8.2  /  Alb  4.0  /  TBili  0.2  /  DBili  x   /  AST  36  /  ALT  17  /  AlkPhos  97  04-23    PT/INR - ( 2023 03:40 )   PT: 11.3 sec;   INR: 0.97 ratio         PTT - ( 2023 03:40 )  PTT:34.4 sec      Cardiac Cath:    Study Date:     2023   Name:           RENAY FLORIAN   :            1960   (62 years)   Gender:         male   MR#:            [id number]   MPI#:           [id number]   Patient Class:  Inpatient     Cath Lab Report    Diagnostic Cardiologist:       Wilmar Arthur MD   Interventional Cardiologist:   Wilmar Arthur MD   Fellow:                        Keely Zuniga MD   Referring Physician:           Astrid Irving MD     Procedures Performed   Procedures:               1.    Arterial Access - Right Femoral     2.    Diagnostic Coronary Angiography   3.    Ultrasound Guided Access   4.    IABP   5.    Aortogram   6.    Left Heart Cath     Indications:               Acute Coronary Syndrome   Lab Visit Indication:      Acute Coronary Syndrome     Conclusions:     Severe two vessel obstructive coronary artery disease   --Left anterior descending artery and left circumflex artery   Normal left main coronary artery   Right dominant system   No gradient across the aortic valve   EF LVEDP = 28mmHg     Status post placement of an IABP through the right common femoral  artery    ===   Patient chest pain free throughout the entire time he was in the  cardiac catheterization laboratory  Recommendations:     Keep right leg straight while IABP and sheath are in position   Continue heparin drip with close monitoring for signs and symptoms of  bleeding  Continue aggressive medical management of coronary artery disease and  associated risk factors  Recommend patient be evaluated by the cardiac surgery team   Patient to be transferred to CICU for further observation and  management    Findings and plan discussed with CICU/Dr. Irving and cardiac  surgery/Dr. Garcia    Procedure Narrative:     Patient: RENAY FLORIAN           MRN: [id number]  Study Date: 2023   05:01 AM      Page 1 of 4          The risks and alternatives of the procedures and conscious sedation  were explained to the patient and informed consent was  obtained. The patient was brought to the cath lab and placed on the  exam table.  Access   Right femoral artery:   The puncture site was infiltrated with 1% Lidocaine. Vascular access  was obtained using modified seldinger technique.    Intra-aortic Balloon Pump   An intra-aortic balloon pump was inserted.     Diagnostic Findings:     Coronary Angiography   LM   Left main artery: The segment is visually normal in size and  structure.    LAD   Proximal left anterior descending: There is a 90 % stenosis in the  proximal third portion of the segment. Midleft anterior  descending: There is a 20 % in-stent restenosis in the middle third  portion of the segment. Mid left anterior descending: There  is a 70 % stenosis in the distal third portion of the segment. First  diagonal: There is a 20 % stenosis.    CX   Proximal circumflex: There is a 70 % stenosis in the distal third  portion of the segment. First obtuse marginal: There is an 80  % stenosis in the ostium portion of the segment.      RCA   Proximal right coronary artery: The segment is visually normal in size  and structure.    Left Heart Cath   Left ventricular function was assessed. Ejection fraction was visually  estimated with a value of 20%. LV to AO pullback was  performed.          TTE / ALEJANDRA:

## 2023-04-23 NOTE — PROGRESS NOTE ADULT - SUBJECTIVE AND OBJECTIVE BOX
RENAY FLORIAN  MRN-[id number]  Patient is a 62y old  Male who presents with a chief complaint of STEMI, found with TVD (23 Apr 2023 06:22)    HPI:  62M PMH of CAD with 1 stent in 2013 to LAD and DM2 p/w intermittent chest pain which was alleviated by resting and sitting down since noon. States CP has been getting progressively worse around midnight which became persistent, patient took 2 baby ASA, and went to the ED when unable to sleep 2/2 pain. States the pain was 9/10, described as squeezing substernal CP.  Denies any shortness of breath fever chills upper respiratory symptoms. In ED found with STEMI with KASEY V1,V2,V3 with +CE. ASA and Brilinta loaded, started on heparin gtt and transferred to [facility] for LHC.    In Cath lab found with severe triple vessel disease (OM1, pLAD, and Cx disease) with vgram 20-25%. IABP placed. States now CP improved, mild chest pressure 1/10.     Of note patient states has not seen physician in a few years. Recently stopped his simvastatin 1 week ago himself in which prior he was intermittently taking 20mg.  (23 Apr 2023 05:40)      Hospital Course:    24 HOUR EVENTS:    REVIEW OF SYSTEMS:    CONSTITUTIONAL: No weakness, fevers or chills  EYES/ENT: No visual changes;  No vertigo or throat pain   NECK: No pain or stiffness  RESPIRATORY: No cough, wheezing, hemoptysis; No shortness of breath  CARDIOVASCULAR: No chest pain or palpitations  GASTROINTESTINAL: No abdominal or epigastric pain. No nausea, vomiting, or hematemesis; No diarrhea or constipation. No melena or hematochezia.  GENITOURINARY: No dysuria, frequency or hematuria  NEUROLOGICAL: No numbness or weakness  SKIN: No itching, rashes      ICU Vital Signs Last 24 Hrs  T(C): 36.7 (23 Apr 2023 11:00), Max: 36.7 (23 Apr 2023 07:00)  T(F): 98 (23 Apr 2023 11:00), Max: 98.1 (23 Apr 2023 07:00)  HR: 62 (23 Apr 2023 18:00) (61 - 87)  BP: 145/93 (23 Apr 2023 04:10) (127/83 - 145/93)  BP(mean): --  ABP: --  ABP(mean): --  RR: 24 (23 Apr 2023 18:00) (16 - 36)  SpO2: 95% (23 Apr 2023 18:00) (93% - 100%)    O2 Parameters below as of 23 Apr 2023 16:00  Patient On (Oxygen Delivery Method): room air            CVP(mm Hg): --  CO: --  CI: --  PA: --  PA(mean): --  PA(direct): --  PCWP: --  LA: --  RA: --  SVR: --  SVRI: --  PVR: --  PVRI: --  I&O's Summary    23 Apr 2023 07:01  -  23 Apr 2023 19:59  --------------------------------------------------------  IN: 400.5 mL / OUT: 2150 mL / NET: -1749.5 mL        CAPILLARY BLOOD GLUCOSE    CAPILLARY BLOOD GLUCOSE      POCT Blood Glucose.: 140 mg/dL (23 Apr 2023 13:42)      PHYSICAL EXAM:  GENERAL: No acute distress, well-developed  HEAD:  Atraumatic, Normocephalic  EYES: EOMI, PERRLA, conjunctiva and sclera clear  NECK: Supple, no lymphadenopathy, no JVD  CHEST/LUNG: CTAB; No wheezes, rales, or rhonchi  HEART: Regular rate and rhythm. Normal S1/S2. No murmurs, rubs, or gallops  ABDOMEN: Soft, non-tender, non-distended; normal bowel sounds, no organomegaly  EXTREMITIES:  2+ peripheral pulses b/l, No clubbing, cyanosis, or edema  NEUROLOGY: A&O x 3, no focal deficits  SKIN: No rashes or lesions    ============================I/O===========================   I&O's Detail    23 Apr 2023 07:01  -  23 Apr 2023 19:59  --------------------------------------------------------  IN:    Heparin: 67.5 mL    Nitroglycerin: 93 mL    Oral Fluid: 240 mL  Total IN: 400.5 mL    OUT:    Voided (mL): 2150 mL  Total OUT: 2150 mL    Total NET: -1749.5 mL        ============================ LABS =========================                        13.8   11.85 )-----------( 293      ( 23 Apr 2023 03:40 )             41.3     04-23    137  |  105  |  12  ----------------------------<  143<H>  3.9   |  20<L>  |  0.77    Ca    9.2      23 Apr 2023 09:13  Phos  3.4     04-23  Mg     2.0     04-23    TPro  7.0  /  Alb  4.2  /  TBili  0.3  /  DBili  x   /  AST  112<H>  /  ALT  12  /  AlkPhos  81  04-23    Troponin T, High Sensitivity Result: 1587 ng/L (04-23-23 @ 12:04)  Troponin T, High Sensitivity Result: 1061 ng/L (04-23-23 @ 09:13)    CKMB Units: 189.7 ng/mL (04-23-23 @ 12:04)  CKMB Units: 145.3 ng/mL (04-23-23 @ 09:13)    Creatine Kinase, Serum: 2103 U/L (04-23-23 @ 12:04)  Creatine Kinase, Serum: 1498 U/L (04-23-23 @ 09:13)    CPK Mass Assay %: 9.0 % (04-23-23 @ 12:04)  CPK Mass Assay %: 9.7 % (04-23-23 @ 09:13)        LIVER FUNCTIONS - ( 23 Apr 2023 09:13 )  Alb: 4.2 g/dL / Pro: 7.0 g/dL / ALK PHOS: 81 U/L / ALT: 12 U/L / AST: 112 U/L / GGT: x           PT/INR - ( 23 Apr 2023 03:40 )   PT: 11.3 sec;   INR: 0.97 ratio         PTT - ( 23 Apr 2023 03:40 )  PTT:34.4 sec        ======================Micro/Rad/Cardio=================  Telemtry: Reviewed   EKG: Reviewed  CXR: Reviewed  Culture: Reviewed   Echo:   Cath:   ======================================================  PAST MEDICAL & SURGICAL HISTORY:  CAD (coronary artery disease)      Diabetes      No significant past surgical history  ====================ASSESSMENT ==============  62M PMH CAD (YADIRA to LAD '13) and DM p/w CP found with STEMI s.p LHC with TVD and IABP placement.     Plan:  ====================== NEUROLOGY=====================  A&Ox4  - continue to monitor mental status as per protocol     ==================== RESPIRATORY======================  Comfortable on RA  - continue to monitor SpO2 with goal >94%    ====================CARDIOVASCULAR==================  TVD  - s/p C 4/23 with severe CAD to pLAD, CX, OM1. IABP placed for coronary perfusion. Vgram 20-25%  - CTS eval pending, Dr. Marques aware  - TTE ordered  - trend CE until peak  - Start heparin gtt for TVD, IABP  - S/P asa and Brilinta load. C/w asa, hold all P2Y12 inhibitors while CTS eval for CABG. F.u P2Y12 levels  - start high intensity lipitor  - Maintain IABP 1:1 augmentation. Monitor site and peripheral pulses per protocol. Daily CXR while in place  - GDMT as frida      Smoker  - smoking cessesation education ordered  - declining nicotine patch at this time    ===================HEMATOLOGIC/ONC ===================  h/h and plts wnl  - Monitor H/H and plts  - DVT PPX: heparin gtt for TVD, IABP, & DVT PPX    ===================== RENAL =========================  SCr 1.1  - Continue monitoring urine output, lytes, SCr/ BUN  - replete lytes prn with goal K >4 and Mg >2    ==================== GASTROINTESTINAL===================  DASH carb consistent diet as frida  - monitor for regular BM while inpatient    =======================    ENDOCRINE  =====================  DM2  - states intermittently takes 500mg of metformin when eating poorly.   - Start mISS. Monitor FS with goal 100-180. Consider addition of basal/bolus insulin if above goal  - f/u hgb a1c    -f/u TSH and lipid panel       ========================INFECTIOUS DISEASE================  Afebrile, mild leukocytosis   - Leukocytosis likely reactive 2/2 STEMI. No s/s of infection   - monitor and trend WBC and temperature curve     Patient requires continuous monitoring with bedside rhythm monitoring, pulse ox monitoring, and intermittent blood gas analysis. Care plan discussed with ICU care team. Patient remained critical and at risk for life threatening decompensation.  Patient seen, examined and plan discussed with CCU team during rounds.     I have personally provided ____ minutes of critical care time excluding time spent on separate procedures, in addition to initial critical care time provided by the CICU Attending, Dr. Irving.    By signing my name below, I, Nidhi Joy, attest that this documentation has been prepared under the direction and in the presence of Delfina Blanca NP.  Electronically signed: Lorenzo Lugo, 04-23-23 @ 20:00    I, Delfina Blanca, personally performed the services described in this documentation. all medical record entries made by the yordyibe were at my direction and in my presence. I have reviewed the chart and agree that the record reflects my personal performance and is accurate and complete  Electronically signed: Delfina Blanca NP.       RENAY FLORIAN  MRN-[id number]  Patient is a 62y old  Male who presents with a chief complaint of STEMI, found with TVD (23 Apr 2023 06:22)    HPI: 62M PMH of CAD with 1 stent in 2013 to LAD and DM2 p/w intermittent chest pain which was alleviated by resting and sitting down since noon. States CP has been getting progressively worse around midnight which became persistent, patient took 2 baby ASA, and went to the ED when unable to sleep 2/2 pain. States the pain was 9/10, described as squeezing substernal CP.  Denies any shortness of breath fever chills upper respiratory symptoms. In ED found with STEMI with KASEY V1,V2,V3 with +CE. ASA and Brilinta loaded, started on heparin gtt and transferred to [facility] for LHC.    In Cath lab found with severe triple vessel disease (OM1, pLAD, and Cx disease) with vgram 20-25%. IABP placed. States now CP improved, mild chest pressure 1/10.     Of note patient states has not seen physician in a few years. Recently stopped his simvastatin 1 week ago himself in which prior he was intermittently taking 20mg.  (23 Apr 2023 05:40)      REVIEW OF SYSTEMS:  CONSTITUTIONAL: No weakness, fevers or chills  EYES/ENT: No visual changes;  No vertigo or throat pain   NECK: No pain or stiffness  RESPIRATORY: No cough, wheezing, hemoptysis; No shortness of breath  CARDIOVASCULAR: No chest pain or palpitations  GASTROINTESTINAL: No abdominal or epigastric pain  No nausea, vomiting, or hematemesis; No diarrhea or constipation. No melena or hematochezia.  GENITOURINARY: No dysuria, frequency or hematuria  NEUROLOGICAL: No numbness or weakness  SKIN: No itching, rashes    ICU Vital Signs Last 24 Hrs  T(C): 36.7 (23 Apr 2023 11:00), Max: 36.7 (23 Apr 2023 07:00)  T(F): 98 (23 Apr 2023 11:00), Max: 98.1 (23 Apr 2023 07:00)  HR: 62 (23 Apr 2023 18:00) (61 - 87)  BP: 145/93 (23 Apr 2023 04:10) (127/83 - 145/93)  RR: 24 (23 Apr 2023 18:00) (16 - 36)  SpO2: 95% (23 Apr 2023 18:00) (93% - 100%)    O2 Parameters below as of 23 Apr 2023 16:00  Patient On (Oxygen Delivery Method): room air      I&O's Summary    23 Apr 2023 07:01  -  23 Apr 2023 19:59  --------------------------------------------------------  IN: 400.5 mL / OUT: 2150 mL / NET: -1749.5 mL      CAPILLARY BLOOD GLUCOSE  POCT Blood Glucose.: 140 mg/dL (23 Apr 2023 13:42)    PHYSICAL EXAM:  GENERAL: No acute distress, well-developed  HEAD:  Atraumatic, Normocephalic  EYES: EOMI, PERRLA, conjunctiva and sclera clear  NECK: Supple, no lymphadenopathy, no JVD  CHEST/LUNG: CTAB; No wheezes, rales, or rhonchi  HEART: Regular rate and rhythm. Normal S1/S2. No murmurs, rubs, or gallops  ABDOMEN: Soft, non-tender, non-distended; normal bowel sounds, no organomegaly  EXTREMITIES:  2+ peripheral pulses b/l, No clubbing, cyanosis, or edema  NEUROLOGY: A&O x 3, no focal deficits  SKIN: No rashes or lesions  ============================I/O===========================   I&O's Detail    23 Apr 2023 07:01  -  23 Apr 2023 19:59  --------------------------------------------------------  IN:    Heparin: 67.5 mL    Nitroglycerin: 93 mL    Oral Fluid: 240 mL  Total IN: 400.5 mL    OUT:    Voided (mL): 2150 mL  Total OUT: 2150 mL    Total NET: -1749.5 mL  ============================ LABS =========================                     13.8   11.85 )-----------( 293      ( 23 Apr 2023 03:40 )             41.3     04-23    137  |  105  |  12  ----------------------------<  143<H>  3.9   |  20<L>  |  0.77    Ca    9.2      23 Apr 2023 09:13  Phos  3.4     04-23  Mg     2.0     04-23    TPro  7.0  /  Alb  4.2  /  TBili  0.3  /  DBili  x   /  AST  112<H>  /  ALT  12  /  AlkPhos  81  04-23    Troponin T, High Sensitivity Result: 1587 ng/L (04-23-23 @ 12:04)  Troponin T, High Sensitivity Result: 1061 ng/L (04-23-23 @ 09:13)    CKMB Units: 189.7 ng/mL (04-23-23 @ 12:04)  CKMB Units: 145.3 ng/mL (04-23-23 @ 09:13)    Creatine Kinase, Serum: 2103 U/L (04-23-23 @ 12:04)  Creatine Kinase, Serum: 1498 U/L (04-23-23 @ 09:13)    CPK Mass Assay %: 9.0 % (04-23-23 @ 12:04)  CPK Mass Assay %: 9.7 % (04-23-23 @ 09:13)    LIVER FUNCTIONS - ( 23 Apr 2023 09:13 )  Alb: 4.2 g/dL / Pro: 7.0 g/dL / ALK PHOS: 81 U/L / ALT: 12 U/L / AST: 112 U/L / GGT: x           PT/INR - ( 23 Apr 2023 03:40 )   PT: 11.3 sec;   INR: 0.97 ratio    PTT - ( 23 Apr 2023 03:40 )  PTT:34.4 sec  ======================Micro/Rad/Cardio=================  Telemtry: Reviewed   EKG: Reviewed  CXR: Reviewed  Echo: Reviewed   Cath: Reviewed   ======================================================  PAST MEDICAL & SURGICAL HISTORY:  CAD (coronary artery disease)    Diabetes    No significant past surgical history    ====================ASSESSMENT ==============  62M PMH CAD (YADIRA to LAD '13) and DM p/w CP found with STEMI s.p LHC with TVD and IABP placement.     ====================== NEUROLOGY=====================  A&Ox4  - continue to monitor mental status as per protocol     ==================== RESPIRATORY======================  Comfortable on RA  - continue to monitor SpO2 with goal >94%    ====================CARDIOVASCULAR==================  TVD  - s/p [facility] 4/23 with severe CAD to pLAD, CX, OM1. IABP placed for coronary perfusion. Vgram 20-25%  - CTS eval (Dr. Marques) - family/patient refusing CTS, would like to proceed with high risk PCI  - s/p LHC high risk PCI: YADIRA x 2 prox & mid LAD, integrillin started   - TTE ordered  - trend CE until peak  - Start heparin gtt for IABP, on hold pending fem sheath removal   - c/w DAPT (ASA/Brilinta) s/p Brilinta load, high intensity Lipitor  - Maintain IABP 1:1 augmentation. Monitor site and peripheral pulses per protocol. Daily CXR while in place  - GDMT as frida      Smoker  - smoking cessesation education ordered  - declining nicotine patch at this time    ===================HEMATOLOGIC/ONC ===================  h/h and plts wnl  - Monitor H/H and plts  - DVT PPX: heparin gtt for IABP, & DVT PPX    ===================== RENAL =========================  SCr 1.1  - Continue monitoring urine output, lytes, SCr/ BUN  - replete lytes prn with goal K >4 and Mg >2    ==================== GASTROINTESTINAL===================  DASH carb consistent diet as frida  - monitor for regular BM while inpatient    =======================    ENDOCRINE  =====================  DM2  - states intermittently takes 500mg of metformin when eating poorly.   - Start mISS. Monitor FS with goal 100-180. Consider addition of basal/bolus insulin if above goal  - f/u hgb a1c    -f/u TSH and lipid panel   ========================INFECTIOUS DISEASE================  Afebrile, mild leukocytosis   - Leukocytosis likely reactive 2/2 STEMI. No s/s of infection   - monitor and trend WBC and temperature curve     Patient requires continuous monitoring with bedside rhythm monitoring, pulse ox monitoring, and intermittent blood gas analysis. Care plan discussed with ICU care team. Patient remained critical and at risk for life threatening decompensation.  Patient seen, examined and plan discussed with CCU team during rounds.     I have personally provided 35 minutes of critical care time excluding time spent on separate procedures, in addition to initial critical care time provided by the CICU Attending, Dr. Irving.    By signing my name below, I, Nidhi Joy, attest that this documentation has been prepared under the direction and in the presence of Delfina Blanca NP.  Electronically signed: Lorenzo Lugo, 04-23-23 @ 20:00    I, Delfina Blanca, personally performed the services described in this documentation. all medical record entries made by the scribe were at my direction and in my presence. I have reviewed the chart and agree that the record reflects my personal performance and is accurate and complete  Electronically signed: Delfina Blanca NP

## 2023-04-23 NOTE — PROVIDER CONTACT NOTE (CHANGE IN STATUS NOTIFICATION) - ACTION/TREATMENT ORDERED:
Nitro gtt increased. Waiting for cath lab to call for report to take patient down for high risk PCI
Nitro gtt continued, fentanyl IVP ordered. Waiting for cath lab to be ready to take patient down for high risk PCI.
Nitro drip ordered, cath lab activated

## 2023-04-23 NOTE — PROVIDER CONTACT NOTE (CHANGE IN STATUS NOTIFICATION) - ASSESSMENT
Pt has 8/10 CP unrelieved by nitro gtt at 200mcg & IABP. All VS stable
Pt has unrelieved chest pain. R fem IABP active. Nitro gtt increased per protocol. VS stable.
Pt has TVD, R groin IABP. VS stable

## 2023-04-23 NOTE — H&P ADULT - NSICDXFAMILYHX_GEN_ALL_CORE_FT
FAMILY HISTORY:  Father  Still living? Unknown  FH: CAD (coronary artery disease), Age at diagnosis: Age Unknown    Mother  Still living? Unknown  FH: type 2 diabetes, Age at diagnosis: Age Unknown    Sibling  Still living? Unknown  FH: type 2 diabetes, Age at diagnosis: Age Unknown

## 2023-04-23 NOTE — H&P ADULT - ASSESSMENT
====================ASSESSMENT ==============  62M PMH CAD (YADIRA to LAD '13) and DM p/w CP found with STEMI s.p LHC with TVD and IABP placement.     Plan:  ====================== NEUROLOGY=====================  A&Ox4  - continue to monitor mental status as per protocol     ==================== RESPIRATORY======================  Comfortable on RA  - continue to monitor SpO2 with goal >94%    ====================CARDIOVASCULAR==================  TVD  - s/p [facility] 4/23 with severe CAD to pLAD, CX, OM1. IABP placed for coronary perfusion. Vgram 20-25%  - CTS eval pending, Dr. Marques aware  - TTE ordered  - trend CE until peak  - Start heparin gtt for TVD, IABP  - S/P asa and Brilinta load. C/w asa, hold all P2Y12 inhibitors while CTS eval for CABG. F.u P2Y12 levels  - start high intensity lipitor  - Maintain IABP 1:1 augmentation. Monitor site and peripheral pulses per protocol. Daily CXR while in place  - GDMT as frida      ===================HEMATOLOGIC/ONC ===================  h/h and plts wnl  - Monitor H/H and plts  - DVT PPX: heparin gtt for TVD, IABP, & DVT PPX    ===================== RENAL =========================  SCr 1.1  - Continue monitoring urine output, lytes, SCr/ BUN  - replete lytes prn with goal K >4 and Mg >2    ==================== GASTROINTESTINAL===================  DASH carb consistent diet as frida  - monitor for regular BM while inpatient    =======================    ENDOCRINE  =====================  DM2  - Start mISS. Monitor FS with goal 100-180. Consider addition of basal/bolus insulin if above goal  - f/u hgb a1c    -f/u TSH and lipid panel       ========================INFECTIOUS DISEASE================  Afebrile, mild leukocytosis   - Leukocytosis likely reactive 2/2 STEMI. No s/s of infection   - monitor and trend WBC and temperature curve         Patient requires continuous monitoring with bedside rhythm monitoring, arterial line, pulse oximetry, ventilator monitoring and intermittent blood gas analysis.  Care plan discussed with ICU care team.  Patient remained critical; required more than usual post op care; I have spent 35 minutes providing non-routine post op care, in addition to initial critical time provided by CICU attending Dr. Irving, re-evaluated multiple times during the day.           ====================ASSESSMENT ==============  62M PMH CAD (YADIRA to LAD '13) and DM p/w CP found with STEMI s.p LHC with TVD and IABP placement.     Plan:  ====================== NEUROLOGY=====================  A&Ox4  - continue to monitor mental status as per protocol     ==================== RESPIRATORY======================  Comfortable on RA  - continue to monitor SpO2 with goal >94%    ====================CARDIOVASCULAR==================  TVD  - s/p [facility] 4/23 with severe CAD to pLAD, CX, OM1. IABP placed for coronary perfusion. Vgram 20-25%  - CTS eval pending, Dr. Marques aware  - TTE ordered  - trend CE until peak  - Start heparin gtt for TVD, IABP  - S/P asa and Brilinta load. C/w asa, hold all P2Y12 inhibitors while CTS eval for CABG. F.u P2Y12 levels  - start high intensity lipitor  - Maintain IABP 1:1 augmentation. Monitor site and peripheral pulses per protocol. Daily CXR while in place  - GDMT as frida      Smoker  - smoking cessesation education ordered  - declining nicotine patch at this time    ===================HEMATOLOGIC/ONC ===================  h/h and plts wnl  - Monitor H/H and plts  - DVT PPX: heparin gtt for TVD, IABP, & DVT PPX    ===================== RENAL =========================  SCr 1.1  - Continue monitoring urine output, lytes, SCr/ BUN  - replete lytes prn with goal K >4 and Mg >2    ==================== GASTROINTESTINAL===================  DASH carb consistent diet as frida  - monitor for regular BM while inpatient    =======================    ENDOCRINE  =====================  DM2  - states intermittently takes 500mg of metformin when eating poorly.   - Start mISS. Monitor FS with goal 100-180. Consider addition of basal/bolus insulin if above goal  - f/u hgb a1c    -f/u TSH and lipid panel       ========================INFECTIOUS DISEASE================  Afebrile, mild leukocytosis   - Leukocytosis likely reactive 2/2 STEMI. No s/s of infection   - monitor and trend WBC and temperature curve         Patient requires continuous monitoring with bedside rhythm monitoring, arterial line, pulse oximetry, ventilator monitoring and intermittent blood gas analysis.  Care plan discussed with ICU care team.  Patient remained critical; required more than usual post op care; I have spent 35 minutes providing non-routine post op care, in addition to initial critical time provided by CICU attending Dr. Irving, re-evaluated multiple times during the day.

## 2023-04-24 LAB
ALBUMIN SERPL ELPH-MCNC: 3.8 G/DL — SIGNIFICANT CHANGE UP (ref 3.3–5)
ALP SERPL-CCNC: 74 U/L — SIGNIFICANT CHANGE UP (ref 40–120)
ALT FLD-CCNC: 21 U/L — SIGNIFICANT CHANGE UP (ref 10–45)
ANION GAP SERPL CALC-SCNC: 13 MMOL/L — SIGNIFICANT CHANGE UP (ref 5–17)
APTT BLD: 29.3 SEC — SIGNIFICANT CHANGE UP (ref 27.5–35.5)
APTT BLD: 48.3 SEC — HIGH (ref 27.5–35.5)
APTT BLD: 56.8 SEC — HIGH (ref 27.5–35.5)
AST SERPL-CCNC: 214 U/L — HIGH (ref 10–40)
BILIRUB SERPL-MCNC: 0.5 MG/DL — SIGNIFICANT CHANGE UP (ref 0.2–1.2)
BUN SERPL-MCNC: 11 MG/DL — SIGNIFICANT CHANGE UP (ref 7–23)
CALCIUM SERPL-MCNC: 9 MG/DL — SIGNIFICANT CHANGE UP (ref 8.4–10.5)
CHLORIDE SERPL-SCNC: 102 MMOL/L — SIGNIFICANT CHANGE UP (ref 96–108)
CK MB BLD-MCNC: 4.6 % — HIGH (ref 0–3.5)
CK MB CFR SERPL CALC: 120.5 NG/ML — HIGH (ref 0–6.7)
CK SERPL-CCNC: 2629 U/L — HIGH (ref 30–200)
CO2 SERPL-SCNC: 19 MMOL/L — LOW (ref 22–31)
CREAT SERPL-MCNC: 0.91 MG/DL — SIGNIFICANT CHANGE UP (ref 0.5–1.3)
EGFR: 96 ML/MIN/1.73M2 — SIGNIFICANT CHANGE UP
GLUCOSE BLDC GLUCOMTR-MCNC: 109 MG/DL — HIGH (ref 70–99)
GLUCOSE BLDC GLUCOMTR-MCNC: 138 MG/DL — HIGH (ref 70–99)
GLUCOSE BLDC GLUCOMTR-MCNC: 158 MG/DL — HIGH (ref 70–99)
GLUCOSE BLDC GLUCOMTR-MCNC: 200 MG/DL — HIGH (ref 70–99)
GLUCOSE SERPL-MCNC: 161 MG/DL — HIGH (ref 70–99)
HCT VFR BLD CALC: 35.7 % — LOW (ref 39–50)
HCT VFR BLD CALC: 36.7 % — LOW (ref 39–50)
HGB BLD-MCNC: 12 G/DL — LOW (ref 13–17)
HGB BLD-MCNC: 12.6 G/DL — LOW (ref 13–17)
INR BLD: 1.1 RATIO — SIGNIFICANT CHANGE UP (ref 0.88–1.16)
LACTATE SERPL-SCNC: 1.5 MMOL/L — SIGNIFICANT CHANGE UP (ref 0.5–2)
MAGNESIUM SERPL-MCNC: 1.7 MG/DL — SIGNIFICANT CHANGE UP (ref 1.6–2.6)
MCHC RBC-ENTMCNC: 29.6 PG — SIGNIFICANT CHANGE UP (ref 27–34)
MCHC RBC-ENTMCNC: 30.3 PG — SIGNIFICANT CHANGE UP (ref 27–34)
MCHC RBC-ENTMCNC: 33.6 GM/DL — SIGNIFICANT CHANGE UP (ref 32–36)
MCHC RBC-ENTMCNC: 34.3 GM/DL — SIGNIFICANT CHANGE UP (ref 32–36)
MCV RBC AUTO: 88.1 FL — SIGNIFICANT CHANGE UP (ref 80–100)
MCV RBC AUTO: 88.2 FL — SIGNIFICANT CHANGE UP (ref 80–100)
NRBC # BLD: 0 /100 WBCS — SIGNIFICANT CHANGE UP (ref 0–0)
NRBC # BLD: 0 /100 WBCS — SIGNIFICANT CHANGE UP (ref 0–0)
PHOSPHATE SERPL-MCNC: 2.9 MG/DL — SIGNIFICANT CHANGE UP (ref 2.5–4.5)
PLATELET # BLD AUTO: 207 K/UL — SIGNIFICANT CHANGE UP (ref 150–400)
PLATELET # BLD AUTO: 227 K/UL — SIGNIFICANT CHANGE UP (ref 150–400)
POTASSIUM SERPL-MCNC: 3.6 MMOL/L — SIGNIFICANT CHANGE UP (ref 3.5–5.3)
POTASSIUM SERPL-SCNC: 3.6 MMOL/L — SIGNIFICANT CHANGE UP (ref 3.5–5.3)
PROT SERPL-MCNC: 6.5 G/DL — SIGNIFICANT CHANGE UP (ref 6–8.3)
PROTHROM AB SERPL-ACNC: 12.7 SEC — SIGNIFICANT CHANGE UP (ref 10.5–13.4)
RBC # BLD: 4.05 M/UL — LOW (ref 4.2–5.8)
RBC # BLD: 4.16 M/UL — LOW (ref 4.2–5.8)
RBC # FLD: 12.8 % — SIGNIFICANT CHANGE UP (ref 10.3–14.5)
RBC # FLD: 13.1 % — SIGNIFICANT CHANGE UP (ref 10.3–14.5)
SODIUM SERPL-SCNC: 134 MMOL/L — LOW (ref 135–145)
TROPONIN T, HIGH SENSITIVITY RESULT: 4870 NG/L — HIGH (ref 0–51)
WBC # BLD: 13.01 K/UL — HIGH (ref 3.8–10.5)
WBC # BLD: 14.7 K/UL — HIGH (ref 3.8–10.5)
WBC # FLD AUTO: 13.01 K/UL — HIGH (ref 3.8–10.5)
WBC # FLD AUTO: 14.7 K/UL — HIGH (ref 3.8–10.5)

## 2023-04-24 PROCEDURE — 99291 CRITICAL CARE FIRST HOUR: CPT

## 2023-04-24 PROCEDURE — 93010 ELECTROCARDIOGRAM REPORT: CPT

## 2023-04-24 PROCEDURE — 99292 CRITICAL CARE ADDL 30 MIN: CPT

## 2023-04-24 PROCEDURE — 71045 X-RAY EXAM CHEST 1 VIEW: CPT | Mod: 26

## 2023-04-24 PROCEDURE — 94010 BREATHING CAPACITY TEST: CPT | Mod: 26

## 2023-04-24 RX ORDER — SIMETHICONE 80 MG/1
160 TABLET, CHEWABLE ORAL ONCE
Refills: 0 | Status: COMPLETED | OUTPATIENT
Start: 2023-04-24 | End: 2023-04-24

## 2023-04-24 RX ORDER — CAPTOPRIL 12.5 MG/1
6.25 TABLET ORAL EVERY 8 HOURS
Refills: 0 | Status: DISCONTINUED | OUTPATIENT
Start: 2023-04-24 | End: 2023-04-25

## 2023-04-24 RX ORDER — CAPTOPRIL 12.5 MG/1
6.25 TABLET ORAL DAILY
Refills: 0 | Status: DISCONTINUED | OUTPATIENT
Start: 2023-04-24 | End: 2023-04-24

## 2023-04-24 RX ORDER — ACETAMINOPHEN 500 MG
1000 TABLET ORAL ONCE
Refills: 0 | Status: COMPLETED | OUTPATIENT
Start: 2023-04-24 | End: 2023-04-24

## 2023-04-24 RX ORDER — POTASSIUM CHLORIDE 20 MEQ
40 PACKET (EA) ORAL ONCE
Refills: 0 | Status: COMPLETED | OUTPATIENT
Start: 2023-04-24 | End: 2023-04-24

## 2023-04-24 RX ORDER — SIMETHICONE 80 MG/1
80 TABLET, CHEWABLE ORAL ONCE
Refills: 0 | Status: COMPLETED | OUTPATIENT
Start: 2023-04-24 | End: 2023-04-24

## 2023-04-24 RX ORDER — HEPARIN SODIUM 5000 [USP'U]/ML
1000 INJECTION INTRAVENOUS; SUBCUTANEOUS
Qty: 25000 | Refills: 0 | Status: DISCONTINUED | OUTPATIENT
Start: 2023-04-24 | End: 2023-04-25

## 2023-04-24 RX ORDER — MAGNESIUM SULFATE 500 MG/ML
2 VIAL (ML) INJECTION
Refills: 0 | Status: COMPLETED | OUTPATIENT
Start: 2023-04-24 | End: 2023-04-24

## 2023-04-24 RX ADMIN — Medication 12.5 MILLIGRAM(S): at 17:25

## 2023-04-24 RX ADMIN — HEPARIN SODIUM 10 UNIT(S)/HR: 5000 INJECTION INTRAVENOUS; SUBCUTANEOUS at 06:29

## 2023-04-24 RX ADMIN — TICAGRELOR 90 MILLIGRAM(S): 90 TABLET ORAL at 05:07

## 2023-04-24 RX ADMIN — CHLORHEXIDINE GLUCONATE 1 APPLICATION(S): 213 SOLUTION TOPICAL at 21:24

## 2023-04-24 RX ADMIN — Medication 2: at 08:38

## 2023-04-24 RX ADMIN — Medication 1000 MILLIGRAM(S): at 21:15

## 2023-04-24 RX ADMIN — EPTIFIBATIDE 9.73 MICROGRAM(S)/KG/MIN: 2 INJECTION, SOLUTION INTRAVENOUS at 08:32

## 2023-04-24 RX ADMIN — Medication 12.5 MILLIGRAM(S): at 05:07

## 2023-04-24 RX ADMIN — CAPTOPRIL 6.25 MILLIGRAM(S): 12.5 TABLET ORAL at 13:21

## 2023-04-24 RX ADMIN — SIMETHICONE 160 MILLIGRAM(S): 80 TABLET, CHEWABLE ORAL at 20:27

## 2023-04-24 RX ADMIN — Medication 25 GRAM(S): at 05:07

## 2023-04-24 RX ADMIN — TICAGRELOR 90 MILLIGRAM(S): 90 TABLET ORAL at 17:25

## 2023-04-24 RX ADMIN — HEPARIN SODIUM 12 UNIT(S)/HR: 5000 INJECTION INTRAVENOUS; SUBCUTANEOUS at 13:18

## 2023-04-24 RX ADMIN — Medication 81 MILLIGRAM(S): at 13:20

## 2023-04-24 RX ADMIN — SODIUM CHLORIDE 3 MILLILITER(S): 9 INJECTION INTRAMUSCULAR; INTRAVENOUS; SUBCUTANEOUS at 13:15

## 2023-04-24 RX ADMIN — ATORVASTATIN CALCIUM 80 MILLIGRAM(S): 80 TABLET, FILM COATED ORAL at 21:25

## 2023-04-24 RX ADMIN — Medication 25 GRAM(S): at 02:37

## 2023-04-24 RX ADMIN — HEPARIN SODIUM 14 UNIT(S)/HR: 5000 INJECTION INTRAVENOUS; SUBCUTANEOUS at 20:27

## 2023-04-24 RX ADMIN — Medication 40 MILLIEQUIVALENT(S): at 02:37

## 2023-04-24 RX ADMIN — Medication 30 MILLILITER(S): at 20:26

## 2023-04-24 RX ADMIN — CAPTOPRIL 6.25 MILLIGRAM(S): 12.5 TABLET ORAL at 23:11

## 2023-04-24 RX ADMIN — Medication 400 MILLIGRAM(S): at 19:45

## 2023-04-24 NOTE — PROGRESS NOTE ADULT - SUBJECTIVE AND OBJECTIVE BOX
Interventional Cardiology Post Cath Progress Note:                Subjective:   Patient feels well, remains on bedrest- no current complaints- Denies chest pain, shortness of breath. Denies pain, numbness, tingling or swelling around BL groin sites (RFA with IABP and LFA access for stent placement).       MEDICATIONS  (STANDING):  aspirin enteric coated 81 milliGRAM(s) Oral daily  atorvastatin 80 milliGRAM(s) Oral at bedtime  chlorhexidine 4% Liquid 1 Application(s) Topical <User Schedule>  coronavirus bivalent (EUA) Booster Vaccine (PFIZER) 0.3 milliLiter(s) IntraMuscular once  eptifibatide Infusion 75 mg/100 mL 2 MICROgram(s)/kG/Min (9.73 mL/Hr) IV Continuous <Continuous>  heparin  Infusion 1000 Unit(s)/Hr (10 mL/Hr) IV Continuous <Continuous>  insulin lispro (ADMELOG) corrective regimen sliding scale   SubCutaneous at bedtime  insulin lispro (ADMELOG) corrective regimen sliding scale   SubCutaneous three times a day before meals  metoprolol tartrate 12.5 milliGRAM(s) Oral two times a day  sodium chloride 0.9% lock flush 3 milliLiter(s) IV Push every 8 hours  ticagrelor 90 milliGRAM(s) Oral every 12 hours    MEDICATIONS  (PRN):      Objective:  Vital Signs Last 24 Hrs  T(C): 37.2 (24 Apr 2023 03:00), Max: 37.2 (24 Apr 2023 03:00)  T(F): 99 (24 Apr 2023 03:00), Max: 99 (24 Apr 2023 03:00)  HR: 61 (24 Apr 2023 07:00) (61 - 80)  BP: 122/74 (24 Apr 2023 07:00) (93/66 - 132/69)  BP(mean): 93 (24 Apr 2023 07:00) (76 - 93)  RR: 25 (24 Apr 2023 07:00) (19 - 36)  SpO2: 94% (24 Apr 2023 07:00) (93% - 98%)    Parameters below as of 24 Apr 2023 07:00  Patient On (Oxygen Delivery Method): room air        04-23-23 @ 07:01  -  04-24-23 @ 07:00  --------------------------------------------------------  IN: 720.5 mL / OUT: 2975 mL / NET: -2254.5 mL                              12.6   13.01 )-----------( 227      ( 24 Apr 2023 00:53 )             36.7     04-24    134<L>  |  102  |  11  ----------------------------<  161<H>  3.6   |  19<L>  |  0.91    Ca    9.0      24 Apr 2023 00:53  Phos  2.9     04-24  Mg     1.7     04-24    TPro  6.5  /  Alb  3.8  /  TBili  0.5  /  DBili  x   /  AST  214<H>  /  ALT  21  /  AlkPhos  74  04-24    PT/INR - ( 23 Apr 2023 03:40 )   PT: 11.3 sec;   INR: 0.97 ratio         PTT - ( 24 Apr 2023 00:52 )  PTT:29.3 sec    Physical Exam:  No apparent distress, alert and oriented times three, appropriate affect  IF groin:  Right and left groin: Soft, non tender, no bleeding or hematoma, clean/dry/intact- RLE/LLE +2 DP pulse (left palpable femoral pulse)  No clubbing, cyanosis or edema      Assessment/Plan:   HPI:  62M PMH of CAD with 1 stent in 2013 to LAD and DM2 p/w intermittent chest pain which was alleviated by resting and sitting down since noon. States CP has been getting progressively worse around midnight which became persistent, patient took 2 baby ASA, and went to the ED when unable to sleep 2/2 pain. States the pain was 9/10, described as squeezing substernal CP.  Denies any shortness of breath fever chills upper respiratory symptoms. In ED found with STEMI with KASEY V1,V2,V3 with +CE. ASA and Brilinta loaded, started on heparin gtt and transferred to [facility] for LHC.    4/23/2023 s/p cardiac angiogram with severe triple vessel disease (OM1, pLAD, and Cx disease) with vgram 20-25%   IABP placed  patient with persistent pain overnight now s/p PCI to mid and prox LAD x2    - BL groin sites are site stable. IABP in place  - Continue DAPT (aspirin 81mg and brilinta 90mg)  - Continue statin  - Recommend a heart healthy diet which includes a variety of fruits and vegetables, whole grains, low fat dairy products, legumes and skinless poultry and fish; food prepared with little or no salt and minimize processed foods  - Avoid using NSAIDs  (Aleve, Motrin, ibuprofen, naproxen) while on DAPT, please utilize Tylenol for pain control (not to exceed 4gm in 24 hours)  - Patient aware to take DAPT  as prescribed and DO NOT STOP taking without consulting cardiologist first or STENT/s WILL CLOSE  - Reviewed and reinforced with patient:  site complications ( eg: bleeding, excruciating pain at the procedural site, large swelling ball size-  extremity numbness, tingling, temperature change), or CHEST PAIN; pt aware that if any of those occur he/she must call cardiologist IMMEDIATELY or 911 or go to nearest emergency room   - Reviewed and reinforced a heart healthy diet, Smoking Cessation  - Patient verbalizes understanding of ALL OF THE ABOVE, and gives positive feedback   -please make sure DAPT is prescribed to pt's preferred pharmacy on dc   -f/u appt in 2 weeks post dc with outpt cardiologist  - Keep Mg >2 K>4   - cont to monitor on tele  - all other care as per primary     Please check [email or web address] password [email or web address] for cardiology service schedule and contact information via TEAMS.

## 2023-04-24 NOTE — PROGRESS NOTE ADULT - SUBJECTIVE AND OBJECTIVE BOX
RENAY FLORIAN  MRN-[id number]  Patient is a 62y old  Male who presents with a chief complaint of STEMI, found with TVD (24 Apr 2023 08:25)    HPI:  62M PMH of CAD with 1 stent in 2013 to LAD and DM2 p/w intermittent chest pain which was alleviated by resting and sitting down since noon. States CP has been getting progressively worse around midnight which became persistent, patient took 2 baby ASA, and went to the ED when unable to sleep 2/2 pain. States the pain was 9/10, described as squeezing substernal CP.  Denies any shortness of breath fever chills upper respiratory symptoms. In ED found with STEMI with KASEY V1,V2,V3 with +CE. ASA and Brilinta loaded, started on heparin gtt and transferred to [facility] for LHC.    In Cath lab found with severe triple vessel disease (OM1, pLAD, and Cx disease) with vgram 20-25%. IABP placed. States now CP improved, mild chest pressure 1/10.     Of note patient states has not seen physician in a few years. Recently stopped his simvastatin 1 week ago himself in which prior he was intermittently taking 20mg.  (23 Apr 2023 05:40)      Hospital Course:    24 HOUR EVENTS:    REVIEW OF SYSTEMS:    CONSTITUTIONAL: No weakness, fevers or chills  EYES/ENT: No visual changes;  No vertigo or throat pain   NECK: No pain or stiffness  RESPIRATORY: No cough, wheezing, hemoptysis; No shortness of breath  CARDIOVASCULAR: No chest pain or palpitations  GASTROINTESTINAL: No abdominal or epigastric pain. No nausea, vomiting, or hematemesis; No diarrhea or constipation. No melena or hematochezia.  GENITOURINARY: No dysuria, frequency or hematuria  NEUROLOGICAL: No numbness or weakness  SKIN: No itching, rashes      ICU Vital Signs Last 24 Hrs  T(C): 37.7 (24 Apr 2023 16:00), Max: 37.7 (24 Apr 2023 08:00)  T(F): 99.9 (24 Apr 2023 16:00), Max: 99.9 (24 Apr 2023 08:00)  HR: 73 (24 Apr 2023 18:00) (61 - 80)  BP: 118/75 (24 Apr 2023 09:00) (93/66 - 132/69)  BP(mean): 89 (24 Apr 2023 09:00) (76 - 93)  ABP: --  ABP(mean): --  RR: 26 (24 Apr 2023 18:00) (20 - 36)  SpO2: 95% (24 Apr 2023 18:00) (94% - 97%)    O2 Parameters below as of 24 Apr 2023 18:00  Patient On (Oxygen Delivery Method): room air            CVP(mm Hg): --  CO: --  CI: --  PA: --  PA(mean): --  PA(direct): --  PCWP: --  LA: --  RA: --  SVR: --  SVRI: --  PVR: --  PVRI: --  I&O's Summary    23 Apr 2023 07:01  -  24 Apr 2023 07:00  --------------------------------------------------------  IN: 720.5 mL / OUT: 2975 mL / NET: -2254.5 mL    24 Apr 2023 07:01  -  24 Apr 2023 19:28  --------------------------------------------------------  IN: 302 mL / OUT: 1500 mL / NET: -1198 mL        CAPILLARY BLOOD GLUCOSE    CAPILLARY BLOOD GLUCOSE      POCT Blood Glucose.: 138 mg/dL (24 Apr 2023 16:56)      PHYSICAL EXAM:  GENERAL: No acute distress, well-developed  HEAD:  Atraumatic, Normocephalic  EYES: EOMI, PERRLA, conjunctiva and sclera clear  NECK: Supple, no lymphadenopathy, no JVD  CHEST/LUNG: CTAB; No wheezes, rales, or rhonchi  HEART: Regular rate and rhythm. Normal S1/S2. No murmurs, rubs, or gallops  ABDOMEN: Soft, non-tender, non-distended; normal bowel sounds, no organomegaly  EXTREMITIES:  2+ peripheral pulses b/l, No clubbing, cyanosis, or edema  NEUROLOGY: A&O x 3, no focal deficits  SKIN: No rashes or lesions    ============================I/O===========================   I&O's Detail    23 Apr 2023 07:01  -  24 Apr 2023 07:00  --------------------------------------------------------  IN:    Eptifbatide: 130 mL    Heparin: 67.5 mL    Heparin: 20 mL    IV PiggyBack: 50 mL    Nitroglycerin: 93 mL    Oral Fluid: 360 mL  Total IN: 720.5 mL    OUT:    Voided (mL): 2975 mL  Total OUT: 2975 mL    Total NET: -2254.5 mL      24 Apr 2023 07:01  -  24 Apr 2023 19:28  --------------------------------------------------------  IN:    Eptifbatide: 60 mL    Heparin: 122 mL    Oral Fluid: 120 mL  Total IN: 302 mL    OUT:    Voided (mL): 1500 mL  Total OUT: 1500 mL    Total NET: -1198 mL        ============================ LABS =========================                        12.0   14.70 )-----------( 207      ( 24 Apr 2023 12:28 )             35.7     04-24    134<L>  |  102  |  11  ----------------------------<  161<H>  3.6   |  19<L>  |  0.91    Ca    9.0      24 Apr 2023 00:53  Phos  2.9     04-24  Mg     1.7     04-24    TPro  6.5  /  Alb  3.8  /  TBili  0.5  /  DBili  x   /  AST  214<H>  /  ALT  21  /  AlkPhos  74  04-24    Troponin T, High Sensitivity Result: 4870 ng/L (04-24-23 @ 00:53)  Troponin T, High Sensitivity Result: 6523 ng/L (04-23-23 @ 21:39)  Troponin T, High Sensitivity Result: 1587 ng/L (04-23-23 @ 12:04)  Troponin T, High Sensitivity Result: 1061 ng/L (04-23-23 @ 09:13)    CKMB Units: 120.5 ng/mL (04-24-23 @ 00:53)  CKMB Units: 151.1 ng/mL (04-23-23 @ 21:39)  CKMB Units: 189.7 ng/mL (04-23-23 @ 12:04)  CKMB Units: 145.3 ng/mL (04-23-23 @ 09:13)    Creatine Kinase, Serum: 2629 U/L (04-24-23 @ 00:53)  Creatine Kinase, Serum: 2544 U/L (04-23-23 @ 21:39)  Creatine Kinase, Serum: 2103 U/L (04-23-23 @ 12:04)  Creatine Kinase, Serum: 1498 U/L (04-23-23 @ 09:13)    CPK Mass Assay %: 4.6 % (04-24-23 @ 00:53)  CPK Mass Assay %: 5.9 % (04-23-23 @ 21:39)  CPK Mass Assay %: 9.0 % (04-23-23 @ 12:04)  CPK Mass Assay %: 9.7 % (04-23-23 @ 09:13)        LIVER FUNCTIONS - ( 24 Apr 2023 00:53 )  Alb: 3.8 g/dL / Pro: 6.5 g/dL / ALK PHOS: 74 U/L / ALT: 21 U/L / AST: 214 U/L / GGT: x           PT/INR - ( 24 Apr 2023 12:28 )   PT: 12.7 sec;   INR: 1.10 ratio         PTT - ( 24 Apr 2023 18:39 )  PTT:56.8 sec    Lactate, Blood: 1.5 mmol/L (04-24-23 @ 00:53)  Lactate, Blood: 1.4 mmol/L (04-23-23 @ 21:39)      ======================Micro/Rad/Cardio=================  Telemtry: Reviewed   EKG: Reviewed  CXR: Reviewed  Culture: Reviewed   Echo:   Cath:   ======================================================  PAST MEDICAL & SURGICAL HISTORY:  CAD (coronary artery disease)      Diabetes      No significant past surgical history        ====================ASSESSMENT ==============  62M PMH CAD (YADIRA to LAD '13) and DM p/w CP found with STEMI s.p LHC with TVD and IABP placement.     ====================== NEUROLOGY=====================  A&Ox4  - continue to monitor mental status as per protocol     ==================== RESPIRATORY======================  Comfortable on RA  - continue to monitor SpO2 with goal >94%    ====================CARDIOVASCULAR==================  TVD  - s/p [facility] 4/23 with severe CAD to pLAD, CX, OM1. IABP placed for coronary perfusion. Vgram 20-25%  - CTS eval (Dr. Marques) - family/patient refusing CTS, would like to proceed with high risk PCI  - s/p LHC high risk PCI: YADIRA x 2 prox & mid LAD, integrillin started   - TTE ordered  -Troponin trending down, lactate was wnl  -No plan for staged PCI, will start GDMT: add Captopril 6.25 mg  - c/w DAPT (ASA/Brilinta) s/p Brilinta load, Atorvastatin 80 mg   - Maintain IABP 1:1 augmentation. Monitor site and peripheral pulses per protocol. Daily CXR while in place. Plan to remove pump on 4/25  -Heparin gtt to be stopped at 5AM on 4/25      Smoker  - smoking cessesation education ordered  - declining nicotine patch at this mayra    ===================HEMATOLOGIC/ONC ===================  h/h and plts wnl  - Monitor H/H and plts  - DVT PPX: heparin gtt for IABP, & DVT PPX    ===================== RENAL =========================  SCr 1.1  - Continue monitoring urine output, lytes, SCr/ BUN  - replete lytes prn with goal K >4 and Mg >2    ==================== GASTROINTESTINAL===================  DASH carb consistent diet as frida  - monitor for regular BM while inpatient    =======================    ENDOCRINE  =====================  DM2  - states intermittently takes 500mg of metformin when eating poorly.   - Start mISS. Monitor FS with goal 100-180. Consider addition of basal/bolus insulin if above goal  - f/u hgb a1c    -f/u TSH and lipid panel   ========================INFECTIOUS DISEASE================  Afebrile, mild leukocytosis   - Leukocytosis likely reactive 2/2 STEMI. No s/s of infection   - monitor and trend WBC and temperature curve       Patient requires continuous monitoring with bedside rhythm monitoring, pulse ox monitoring, and intermittent blood gas analysis. Care plan discussed with ICU care team. Patient remained critical and at risk for life threatening decompensation.  Patient seen, examined and plan discussed with CCU team during rounds.     I have personally provided ____ minutes of critical care time excluding time spent on separate procedures, in addition to initial critical care time provided by the CICU Attending, Dr. Irving    By signing my name below, I, Dionna Godinez, attest that this documentation has been prepared under the direction and in the presence of Delfina Blanca NP.  Electronically signed: Lorenzo Wong, 04-24-23 @ 19:28    I, Delfina Blanca NP., personally performed the services described in this documentation. all medical record entries made by the scribe were at my direction and in my presence. I have reviewed the chart and agree that the record reflects my personal performance and is accurate and complete  Electronically signed: Delfina Blanca NP.       RENAY FLORIAN  MRN-[id number]  Patient is a 62y old  Male who presents with a chief complaint of STEMI, found with TVD (24 Apr 2023 08:25)    HPI: 62M PMH of CAD with 1 stent in 2013 to LAD and DM2 p/w intermittent chest pain which was alleviated by resting and sitting down since noon. States CP has been getting progressively worse around midnight which became persistent, patient took 2 baby ASA, and went to the ED when unable to sleep 2/2 pain. States the pain was 9/10, described as squeezing substernal CP.  Denies any shortness of breath fever chills upper respiratory symptoms. In ED found with STEMI with KASEY V1,V2,V3 with +CE. ASA and Brilinta loaded, started on heparin gtt and transferred to [facility] for LHC.  In Cath lab found with severe triple vessel disease (OM1, pLAD, and Cx disease) with vgram 20-25%. IABP placed. States now CP improved, mild chest pressure 1/10.  Of note patient states has not seen physician in a few years. Recently stopped his simvastatin 1 week ago himself in which prior he was intermittently taking 20mg.  (23 Apr 2023 05:40)    REVIEW OF SYSTEMS:  CONSTITUTIONAL: No weakness, fevers or chills  EYES/ENT: No visual changes;  No vertigo or throat pain   NECK: No pain or stiffness  RESPIRATORY: No cough, wheezing, hemoptysis; No shortness of breath  CARDIOVASCULAR: No chest pain or palpitations  GASTROINTESTINAL: No abdominal or epigastric pain  No nausea, vomiting, or hematemesis; No diarrhea or constipation. No melena or hematochezia.  GENITOURINARY: No dysuria, frequency or hematuria  NEUROLOGICAL: No numbness or weakness  SKIN: No itching, rashes    ICU Vital Signs Last 24 Hrs  T(C): 37.7 (24 Apr 2023 16:00), Max: 37.7 (24 Apr 2023 08:00)  T(F): 99.9 (24 Apr 2023 16:00), Max: 99.9 (24 Apr 2023 08:00)  HR: 73 (24 Apr 2023 18:00) (61 - 80)  BP: 118/75 (24 Apr 2023 09:00) (93/66 - 132/69)  BP(mean): 89 (24 Apr 2023 09:00) (76 - 93)  RR: 26 (24 Apr 2023 18:00) (20 - 36)  SpO2: 95% (24 Apr 2023 18:00) (94% - 97%)    O2 Parameters below as of 24 Apr 2023 18:00  Patient On (Oxygen Delivery Method): room air    I&O's Summary    23 Apr 2023 07:01  -  24 Apr 2023 07:00  --------------------------------------------------------  IN: 720.5 mL / OUT: 2975 mL / NET: -2254.5 mL    24 Apr 2023 07:01  -  24 Apr 2023 19:28  --------------------------------------------------------  IN: 302 mL / OUT: 1500 mL / NET: -1198 mL    CAPILLARY BLOOD GLUCOSE  POCT Blood Glucose.: 138 mg/dL (24 Apr 2023 16:56)    PHYSICAL EXAM:  GENERAL: No acute distress, well-developed  HEAD:  Atraumatic, Normocephalic  EYES: EOMI, PERRLA, conjunctiva and sclera clear  NECK: Supple, no lymphadenopathy, no JVD  CHEST/LUNG: CTAB; No wheezes, rales, or rhonchi  HEART: Regular rate and rhythm. Normal S1/S2. No murmurs, rubs, or gallops  ABDOMEN: Soft, non-tender, non-distended; normal bowel sounds, no organomegaly  EXTREMITIES:  2+ peripheral pulses b/l, No clubbing, cyanosis, or edema  NEUROLOGY: A&O x 3, no focal deficits  SKIN: No rashes or lesions  ============================I/O===========================   I&O's Detail    23 Apr 2023 07:01 - 24 Apr 2023 07:00  --------------------------------------------------------  IN:    Eptifbatide: 130 mL    Heparin: 67.5 mL    Heparin: 20 mL    IV PiggyBack: 50 mL    Nitroglycerin: 93 mL    Oral Fluid: 360 mL  Total IN: 720.5 mL    OUT:    Voided (mL): 2975 mL  Total OUT: 2975 mL    Total NET: -2254.5 mL      24 Apr 2023 07:01  -  24 Apr 2023 19:28  --------------------------------------------------------  IN:    Eptifbatide: 60 mL    Heparin: 122 mL    Oral Fluid: 120 mL  Total IN: 302 mL    OUT:    Voided (mL): 1500 mL  Total OUT: 1500 mL    Total NET: -1198 mL  ============================ LABS =========================                      12.0   14.70 )-----------( 207      ( 24 Apr 2023 12:28 )             35.7     04-24    134<L>  |  102  |  11  ----------------------------<  161<H>  3.6   |  19<L>  |  0.91    Ca    9.0      24 Apr 2023 00:53  Phos  2.9     04-24  Mg     1.7     04-24    TPro  6.5  /  Alb  3.8  /  TBili  0.5  /  DBili  x   /  AST  214<H>  /  ALT  21  /  AlkPhos  74  04-24    Troponin T, High Sensitivity Result: 4870 ng/L (04-24-23 @ 00:53)  Troponin T, High Sensitivity Result: 6523 ng/L (04-23-23 @ 21:39)  Troponin T, High Sensitivity Result: 1587 ng/L (04-23-23 @ 12:04)  Troponin T, High Sensitivity Result: 1061 ng/L (04-23-23 @ 09:13)    CKMB Units: 120.5 ng/mL (04-24-23 @ 00:53)  CKMB Units: 151.1 ng/mL (04-23-23 @ 21:39)  CKMB Units: 189.7 ng/mL (04-23-23 @ 12:04)  CKMB Units: 145.3 ng/mL (04-23-23 @ 09:13)    Creatine Kinase, Serum: 2629 U/L (04-24-23 @ 00:53)  Creatine Kinase, Serum: 2544 U/L (04-23-23 @ 21:39)  Creatine Kinase, Serum: 2103 U/L (04-23-23 @ 12:04)  Creatine Kinase, Serum: 1498 U/L (04-23-23 @ 09:13)    CPK Mass Assay %: 4.6 % (04-24-23 @ 00:53)  CPK Mass Assay %: 5.9 % (04-23-23 @ 21:39)  CPK Mass Assay %: 9.0 % (04-23-23 @ 12:04)  CPK Mass Assay %: 9.7 % (04-23-23 @ 09:13)    LIVER FUNCTIONS - ( 24 Apr 2023 00:53 )  Alb: 3.8 g/dL / Pro: 6.5 g/dL / ALK PHOS: 74 U/L / ALT: 21 U/L / AST: 214 U/L / GGT: x           PT/INR - ( 24 Apr 2023 12:28 )   PT: 12.7 sec;   INR: 1.10 ratio    PTT - ( 24 Apr 2023 18:39 )  PTT:56.8 sec    Lactate, Blood: 1.5 mmol/L (04-24-23 @ 00:53)  Lactate, Blood: 1.4 mmol/L (04-23-23 @ 21:39)  ======================Micro/Rad/Cardio=================  Telemtry: Reviewed   EKG: Reviewed  CXR: Reviewed   Echo: Reviewed   Cath: Reviewed   ======================================================  PAST MEDICAL & SURGICAL HISTORY:  CAD (coronary artery disease)    Diabetes    No significant past surgical history    ====================ASSESSMENT ==============  62M PMH CAD (YADIRA to LAD '13) and DM p/w CP found with STEMI s.p LHC with TVD and IABP placement.     ====================== NEUROLOGY=====================  A&Ox4  - continue to monitor mental status as per protocol     ==================== RESPIRATORY======================  Comfortable on RA  - continue to monitor SpO2 with goal >94%    ====================CARDIOVASCULAR==================  TVD  - s/p [facility] 4/23 with severe CAD to pLAD, CX, OM1. IABP placed for coronary perfusion. Vgram 20-25%  - CTS eval (Dr. Marques) - family/patient refusing CTS, would like to proceed with high risk PCI  - s/p LHC high risk PCI: YADIRA x 2 prox & mid LAD, integrillin gtt completed  -Troponin trending down, lactate was wnl  - Outpatient staged PCI, will start GDMT: add Captopril 6.25 mg  - c/w DAPT (ASA/Brilinta) s/p Brilinta load, Atorvastatin 80 mg   - IABP 1:1 will attempt to wean w/ plan to remove in AM    Smoker  - smoking cessesation education ordered  - declining nicotine patch at this mayra    ===================HEMATOLOGIC/ONC ===================  h/h and plts wnl  - Monitor H/H and plts  - DVT PPX: heparin gtt for IABP, & DVT PPX    ===================== RENAL =========================  SCr 1.1  - Continue monitoring urine output, lytes, SCr/ BUN  - replete lytes prn with goal K >4 and Mg >2    ==================== GASTROINTESTINAL===================  DASH carb consistent diet as frida  - monitor for regular BM while inpatient    =======================    ENDOCRINE  =====================  DM2  - states intermittently takes 500mg of metformin when eating poorly.   - Start mISS. Monitor FS with goal 100-180. Consider addition of basal/bolus insulin if above goal  - f/u hgb a1c    -f/u TSH and lipid panel   ========================INFECTIOUS DISEASE================  Afebrile, mild leukocytosis   - Leukocytosis likely reactive 2/2 STEMI. No s/s of infection   - monitor and trend WBC and temperature curve       Patient requires continuous monitoring with bedside rhythm monitoring, pulse ox monitoring, and intermittent blood gas analysis. Care plan discussed with ICU care team. Patient remained critical and at risk for life threatening decompensation.  Patient seen, examined and plan discussed with CCU team during rounds.     I have personally provided 35 minutes of critical care time excluding time spent on separate procedures, in addition to initial critical care time provided by the CICU Attending, Dr. Irving    By signing my name below, I, Dionna Godinez, attest that this documentation has been prepared under the direction and in the presence of Delfina Blanca NP.  Electronically signed: Lorenzo Wong, 04-24-23 @ 19:28    Delfina CASTILLO NP., personally performed the services described in this documentation. all medical record entries made by the scribe were at my direction and in my presence. I have reviewed the chart and agree that the record reflects my personal performance and is accurate and complete  Electronically signed: Delfina Blanca NP

## 2023-04-24 NOTE — CHART NOTE - NSCHARTNOTEFT_GEN_A_CORE
Removal of Femoral Sheath    Pulses in the left lower extremity are palpable. The patient was placed in the supine position. The insertion site was identified and the sutures were removed per protocol.  The 6 Tamazight femoral sheath was then removed. Direct pressure was applied for 20 minutes.     Monitoring of the left groin and both lower extremities including neuro-vascular checks and vital signs every 15 minutes x 4, then every 30 minutes x 2, then every 1 hour was ordered.    Complications: None      Delfina Blanca [facility] [phone]

## 2023-04-24 NOTE — PROGRESS NOTE ADULT - SUBJECTIVE AND OBJECTIVE BOX
RENAY FLORIAN  MRN-[id number]  Patient is a 62y old  Male who presents with a chief complaint of STEMI, found with TVD (23 Apr 2023 06:22)    HPI: 62M PMH of CAD with 1 stent in 2013 to LAD and DM2 p/w intermittent chest pain which was alleviated by resting and sitting down since noon. States CP has been getting progressively worse around midnight which became persistent, patient took 2 baby ASA, and went to the ED when unable to sleep 2/2 pain. States the pain was 9/10, described as squeezing substernal CP.  Denies any shortness of breath fever chills upper respiratory symptoms. In ED found with STEMI with KASEY V1,V2,V3 with +CE. ASA and Brilinta loaded, started on heparin gtt and transferred to [facility] for LHC.    In Cath lab found with severe triple vessel disease (OM1, pLAD, and Cx disease) with vgram 20-25%. IABP placed. States now CP improved, mild chest pressure 1/10.     Of note patient states has not seen physician in a few years. Recently stopped his simvastatin 1 week ago himself in which prior he was intermittently taking 20mg.  (23 Apr 2023 05:40)      REVIEW OF SYSTEMS:  CONSTITUTIONAL: No weakness, fevers or chills  EYES/ENT: No visual changes;  No vertigo or throat pain   NECK: No pain or stiffness  RESPIRATORY: No cough, wheezing, hemoptysis; No shortness of breath  CARDIOVASCULAR: No chest pain or palpitations  GASTROINTESTINAL: No abdominal or epigastric pain  No nausea, vomiting, or hematemesis; No diarrhea or constipation. No melena or hematochezia.  GENITOURINARY: No dysuria, frequency or hematuria  NEUROLOGICAL: No numbness or weakness  SKIN: No itching, rashes    ICU Vital Signs Last 24 Hrs  T(C): 37.2 (24 Apr 2023 03:00), Max: 37.2 (24 Apr 2023 03:00)  T(F): 99 (24 Apr 2023 03:00), Max: 99 (24 Apr 2023 03:00)  HR: 61 (24 Apr 2023 07:00) (61 - 80)  BP: 122/74 (24 Apr 2023 07:00) (93/66 - 132/69)  BP(mean): 93 (24 Apr 2023 07:00) (76 - 93)  ABP: --  ABP(mean): --  RR: 25 (24 Apr 2023 07:00) (18 - 36)  SpO2: 94% (24 Apr 2023 07:00) (93% - 98%)    O2 Parameters below as of 24 Apr 2023 07:00  Patient On (Oxygen Delivery Method): room air            O2 Parameters below as of 23 Apr 2023 16:00  Patient On (Oxygen Delivery Method): room air          CAPILLARY BLOOD GLUCOSE  POCT Blood Glucose.: 140 mg/dL (23 Apr 2023 13:42)    PHYSICAL EXAM:  GENERAL: No acute distress, well-developed  HEAD:  Atraumatic, Normocephalic  EYES: EOMI, PERRLA, conjunctiva and sclera clear  NECK: Supple, no lymphadenopathy, no JVD  CHEST/LUNG: CTAB; No wheezes, rales, or rhonchi  HEART: Regular rate and rhythm. Normal S1/S2. No murmurs, rubs, or gallops  ABDOMEN: Soft, non-tender, non-distended; normal bowel sounds, no organomegaly  EXTREMITIES:  2+ peripheral pulses b/l, No clubbing, cyanosis, or edema  NEUROLOGY: A&O x 3, no focal deficits  SKIN: No rashes or lesions  ============================I/O===========================   I&O's Detail    23 Apr 2023 07:01  -  23 Apr 2023 19:59  --------------------------------------------------------  IN:    Heparin: 67.5 mL    Nitroglycerin: 93 mL    Oral Fluid: 240 mL  Total IN: 400.5 mL    OUT:    Voided (mL): 2150 mL  Total OUT: 2150 mL    Total NET: -1749.5 mL  ============================ LABS =========================                     13.8   11.85 )-----------( 293      ( 23 Apr 2023 03:40 )             41.3     04-23    137  |  105  |  12  ----------------------------<  143<H>  3.9   |  20<L>  |  0.77    Ca    9.2      23 Apr 2023 09:13  Phos  3.4     04-23  Mg     2.0     04-23    TPro  7.0  /  Alb  4.2  /  TBili  0.3  /  DBili  x   /  AST  112<H>  /  ALT  12  /  AlkPhos  81  04-23    Troponin T, High Sensitivity Result: 1587 ng/L (04-23-23 @ 12:04)  Troponin T, High Sensitivity Result: 1061 ng/L (04-23-23 @ 09:13)    CKMB Units: 189.7 ng/mL (04-23-23 @ 12:04)  CKMB Units: 145.3 ng/mL (04-23-23 @ 09:13)    Creatine Kinase, Serum: 2103 U/L (04-23-23 @ 12:04)  Creatine Kinase, Serum: 1498 U/L (04-23-23 @ 09:13)    CPK Mass Assay %: 9.0 % (04-23-23 @ 12:04)  CPK Mass Assay %: 9.7 % (04-23-23 @ 09:13)    LIVER FUNCTIONS - ( 23 Apr 2023 09:13 )  Alb: 4.2 g/dL / Pro: 7.0 g/dL / ALK PHOS: 81 U/L / ALT: 12 U/L / AST: 112 U/L / GGT: x           PT/INR - ( 23 Apr 2023 03:40 )   PT: 11.3 sec;   INR: 0.97 ratio    PTT - ( 23 Apr 2023 03:40 )  PTT:34.4 sec  ======================Micro/Rad/Cardio=================  Telemtry: Reviewed   EKG: Reviewed  CXR: Reviewed  Echo: Reviewed   Cath: Reviewed   ======================================================  PAST MEDICAL & SURGICAL HISTORY:  CAD (coronary artery disease)    Diabetes    No significant past surgical history       RENAY FLORIAN  MRN-[id number]  Patient is a 62y old  Male who presents with a chief complaint of STEMI, found with TVD (23 Apr 2023 06:22)    HPI: 62M PMH of CAD with 1 stent in 2013 to LAD and DM2 p/w intermittent chest pain which was alleviated by resting and sitting down since noon. States CP has been getting progressively worse around midnight which became persistent, patient took 2 baby ASA, and went to the ED when unable to sleep 2/2 pain. States the pain was 9/10, described as squeezing substernal CP.  Denies any shortness of breath fever chills upper respiratory symptoms. In ED found with STEMI with KASEY V1,V2,V3 with +CE. ASA and Brilinta loaded, started on heparin gtt and transferred to [facility] for LHC.    In Cath lab found with severe triple vessel disease (OM1, pLAD, and Cx disease) with vgram 20-25%. IABP placed. States now CP improved, mild chest pressure 1/10.     Of note patient states has not seen physician in a few years. Recently stopped his simvastatin 1 week ago himself in which prior he was intermittently taking 20mg.  (23 Apr 2023 05:40)    24hr events:  Patient has been eating without issues. He endorse persistent intermittent dry cough. Denied chest pain, SOB, palpitation, dizziness.       REVIEW OF SYSTEMS:  CONSTITUTIONAL: No weakness, fevers or chills  EYES/ENT: No visual changes;  No vertigo or throat pain   NECK: No pain or stiffness  RESPIRATORY: No cough, wheezing, hemoptysis; No shortness of breath  CARDIOVASCULAR: No chest pain or palpitations  GASTROINTESTINAL: No abdominal or epigastric pain  No nausea, vomiting, or hematemesis; No diarrhea or constipation. No melena or hematochezia.  GENITOURINARY: No dysuria, frequency or hematuria  NEUROLOGICAL: No numbness or weakness  SKIN: No itching, rashes    ICU Vital Signs Last 24 Hrs  T(C): 37.2 (24 Apr 2023 03:00), Max: 37.2 (24 Apr 2023 03:00)  T(F): 99 (24 Apr 2023 03:00), Max: 99 (24 Apr 2023 03:00)  HR: 61 (24 Apr 2023 07:00) (61 - 80)  BP: 122/74 (24 Apr 2023 07:00) (93/66 - 132/69)  BP(mean): 93 (24 Apr 2023 07:00) (76 - 93)  ABP: --  ABP(mean): --  RR: 25 (24 Apr 2023 07:00) (18 - 36)  SpO2: 94% (24 Apr 2023 07:00) (93% - 98%)    O2 Parameters below as of 24 Apr 2023 07:00  Patient On (Oxygen Delivery Method): room air            O2 Parameters below as of 23 Apr 2023 16:00  Patient On (Oxygen Delivery Method): room air          CAPILLARY BLOOD GLUCOSE  POCT Blood Glucose.: 140 mg/dL (23 Apr 2023 13:42)    PHYSICAL EXAM:  GENERAL: No acute distress, well-developed  HEAD:  Atraumatic, Normocephalic  EYES: EOMI, PERRLA, conjunctiva and sclera clear  NECK: Supple, no lymphadenopathy, no JVD  CHEST/LUNG: CTAB; No wheezes, rales, or rhonchi  HEART: Regular rate and rhythm. Normal S1/S2. No murmurs, rubs, or gallops  ABDOMEN: Soft, non-tender, non-distended; normal bowel sounds, no organomegaly  EXTREMITIES:  2+ peripheral pulses b/l, No clubbing, cyanosis, or edema  NEUROLOGY: A&O x 3, no focal deficits  SKIN: Femoral site was CDI, no pain on palpation    ============================I/O===========================   I&O's Detail    23 Apr 2023 07:01  -  23 Apr 2023 19:59  --------------------------------------------------------  IN:    Heparin: 67.5 mL    Nitroglycerin: 93 mL    Oral Fluid: 240 mL  Total IN: 400.5 mL    OUT:    Voided (mL): 2150 mL  Total OUT: 2150 mL    Total NET: -1749.5 mL  ============================ LABS =========================                     13.8   11.85 )-----------( 293      ( 23 Apr 2023 03:40 )             41.3     04-23    137  |  105  |  12  ----------------------------<  143<H>  3.9   |  20<L>  |  0.77    Ca    9.2      23 Apr 2023 09:13  Phos  3.4     04-23  Mg     2.0     04-23    TPro  7.0  /  Alb  4.2  /  TBili  0.3  /  DBili  x   /  AST  112<H>  /  ALT  12  /  AlkPhos  81  04-23    Troponin T, High Sensitivity Result: 1587 ng/L (04-23-23 @ 12:04)  Troponin T, High Sensitivity Result: 1061 ng/L (04-23-23 @ 09:13)    CKMB Units: 189.7 ng/mL (04-23-23 @ 12:04)  CKMB Units: 145.3 ng/mL (04-23-23 @ 09:13)    Creatine Kinase, Serum: 2103 U/L (04-23-23 @ 12:04)  Creatine Kinase, Serum: 1498 U/L (04-23-23 @ 09:13)    CPK Mass Assay %: 9.0 % (04-23-23 @ 12:04)  CPK Mass Assay %: 9.7 % (04-23-23 @ 09:13)    LIVER FUNCTIONS - ( 23 Apr 2023 09:13 )  Alb: 4.2 g/dL / Pro: 7.0 g/dL / ALK PHOS: 81 U/L / ALT: 12 U/L / AST: 112 U/L / GGT: x           PT/INR - ( 23 Apr 2023 03:40 )   PT: 11.3 sec;   INR: 0.97 ratio    PTT - ( 23 Apr 2023 03:40 )  PTT:34.4 sec  ======================Micro/Rad/Cardio=================  Telemtry: Reviewed   EKG: Reviewed  CXR: Reviewed  Echo: Reviewed   Cath: Reviewed   ======================================================  PAST MEDICAL & SURGICAL HISTORY:  CAD (coronary artery disease)    Diabetes    No significant past surgical history

## 2023-04-24 NOTE — PROGRESS NOTE ADULT - NS ATTEND AMEND GEN_ALL_CORE FT
No acute events reported overnight.  The patient reports that he is clinically doing well.  He denies any chest pain/tightness/discomfort, fevers, chills, sweats, light sensation, dizzy or palpitations.  Balloon pump is in the right groin.  No ecchymosis no hematoma noted around right groin sheath.  Left groin is soft with no ecchymosis/hematoma/bruit.  Currently on Integrilin drip.  Telemetry demonstrates sinus rhythm with rates in the 60s to 80s with PVCs.  Agree with 14 point review of systems and physical examination as noted above.    Assessment/plan  62-year-old man with past medical history significant for diabetes mellitus type 2, dyslipidemia and coronary artery disease who presented from outside hospital with intermittent substernal chest pain that started around noon on 4/22/2023.  The patient reports that he began to have symptoms approximately 1 week prior to his hospitalization that gradually got worse in nature.  He presented with an acute coronary artery syndrome/non-ST elevation myocardial infarction and underwent cardiac catheterization that demonstrated severe two-vessel obstructive coronary artery disease (left anterior descending artery/left circumflex artery) and intra-aortic balloon pump was placed.  The patient was evaluated by cardiac surgery team.  He refused to undergo CABG and due to recurrent chest discomfort was brought to the cardiac catheterization lab for PCI of his left anterior descending artery (2 stents were placed using IVUS guidance).    -- Wean intra-aortic balloon pump as tolerated.  Currently a one-to-one.  The patient is currently on a heparin drip with the balloon pump.  -- Continue aspirin 81 mg daily and Brilinta 90 mg by mouth twice a day.  The patient was told to avoid all NSAIDs.  -- Continue Integrilin drip for 18 hours.  Closely monitor for signs and symptoms of bleeding.  -- Continue atorvastatin 80 mg daily.  -- Discussed with the patient and his family the importance of medication compliance and close follow-up care with his medical team.  The importance of eating a heart healthy diet was gone over.  -- Recommend staged PCI of OM 1/left circumflex artery as an outpatient.  This was discussed with the patient and his wife who are agreeable with the plan.  Indications and details for staged PCI was gone over.  Benefits and risk were gone over.  Risks include but not limited to infection, bleeding, arrhythmia, TIA/stroke, hemodynamic NSTEMI, vascular injury, need for urgent surgery and death.  -- TTE from 4/24/2023 demonstrated EF 33% with no evidence of a thrombus in the left ventricle.  Multiple segmental abnormalities with normal right ventricular cavity size, normal wall thickness and normal systolic function.  Trace mitral regurgitation with no evidence of aortic regurgitation.  Would consider repeat TTE in the next few days limited to reassess LV function and thrombus.  -- Recommend up titration of goal-directed medical therapy/  -- Continue telemetry monitoring.  Aim for potassium greater than 4 magnesium greater than 2.    All questions and concerns of the patient and his wife were addressed.    Findings and plan were discussed with CICU team/Dr. Irving.

## 2023-04-24 NOTE — PROGRESS NOTE ADULT - ASSESSMENT
====================ASSESSMENT ==============  62M PMH CAD (YADIRA to LAD '13) and DM p/w CP found with STEMI s.p LHC with TVD and IABP placement.     ====================== NEUROLOGY=====================  A&Ox4  - continue to monitor mental status as per protocol     ==================== RESPIRATORY======================  Comfortable on RA  - continue to monitor SpO2 with goal >94%    ====================CARDIOVASCULAR==================  TVD  - s/p [facility] 4/23 with severe CAD to pLAD, CX, OM1. IABP placed for coronary perfusion. Vgram 20-25%  - CTS eval (Dr. Marques) - family/patient refusing CTS, would like to proceed with high risk PCI  - s/p [facility] high risk PCI: YADIRA x 2 prox & mid LAD, integrillin started   - TTE ordered  - trend CE until peak  - Start heparin gtt for IABP, on hold pending fem sheath removal   - c/w DAPT (ASA/Brilinta) s/p Brilinta load, high intensity Lipitor  - Maintain IABP 1:1 augmentation. Monitor site and peripheral pulses per protocol. Daily CXR while in place  - GDMT as frida      Smoker  - smoking cessesation education ordered  - declining nicotine patch at this mayra    ===================HEMATOLOGIC/ONC ===================  h/h and plts wnl  - Monitor H/H and plts  - DVT PPX: heparin gtt for IABP, & DVT PPX    ===================== RENAL =========================  SCr 1.1  - Continue monitoring urine output, lytes, SCr/ BUN  - replete lytes prn with goal K >4 and Mg >2    ==================== GASTROINTESTINAL===================  DASH carb consistent diet as frida  - monitor for regular BM while inpatient    =======================    ENDOCRINE  =====================  DM2  - states intermittently takes 500mg of metformin when eating poorly.   - Start mISS. Monitor FS with goal 100-180. Consider addition of basal/bolus insulin if above goal  - f/u hgb a1c    -f/u TSH and lipid panel   ========================INFECTIOUS DISEASE================  Afebrile, mild leukocytosis   - Leukocytosis likely reactive 2/2 STEMI. No s/s of infection   - monitor and trend WBC and temperature curve    ====================ASSESSMENT ==============  62M PMH CAD (YADIRA to LAD '13) and DM p/w CP found with STEMI s.p LHC with TVD and IABP placement.     ====================== NEUROLOGY=====================  A&Ox4  - continue to monitor mental status as per protocol     ==================== RESPIRATORY======================  Comfortable on RA  - continue to monitor SpO2 with goal >94%    ====================CARDIOVASCULAR==================  TVD  - s/p [facility] 4/23 with severe CAD to pLAD, CX, OM1. IABP placed for coronary perfusion. Vgram 20-25%  - CTS eval (Dr. Marques) - family/patient refusing CTS, would like to proceed with high risk PCI  - s/p [facility] high risk PCI: YADIRA x 2 prox & mid LAD, integrillin started   - TTE ordered  -Troponin trending down, lactate was wnl  -No plan for staged PCI, will start GDMT: add Captopril 6.25 mg  - c/w DAPT (ASA/Brilinta) s/p Brilinta load, Atorvastatin 80 mg   - Maintain IABP 1:1 augmentation. Monitor site and peripheral pulses per protocol. Daily CXR while in place. Plan to remove pump on 4/25  -Heparin gtt to be stopped at 5AM on 4/25      Smoker  - smoking cessesation education ordered  - declining nicotine patch at this mayra    ===================HEMATOLOGIC/ONC ===================  h/h and plts wnl  - Monitor H/H and plts  - DVT PPX: heparin gtt for IABP, & DVT PPX    ===================== RENAL =========================  SCr 1.1  - Continue monitoring urine output, lytes, SCr/ BUN  - replete lytes prn with goal K >4 and Mg >2    ==================== GASTROINTESTINAL===================  DASH carb consistent diet as frida  - monitor for regular BM while inpatient    =======================    ENDOCRINE  =====================  DM2  - states intermittently takes 500mg of metformin when eating poorly.   - Start mISS. Monitor FS with goal 100-180. Consider addition of basal/bolus insulin if above goal  - f/u hgb a1c    -f/u TSH and lipid panel   ========================INFECTIOUS DISEASE================  Afebrile, mild leukocytosis   - Leukocytosis likely reactive 2/2 STEMI. No s/s of infection   - monitor and trend WBC and temperature curve

## 2023-04-24 NOTE — PROGRESS NOTE ADULT - ATTENDING COMMENTS
Given cath findings in setting of STEMI CT Surgery Consult /w Dr. Garcia was placed- in ight of continuous ischemia and refusal of the CABG, pt was taken back to the cath lab on 4/23 for the PCI to LAD  IABP for coronary perfusion  on integrillin gtt until 2:30 pm  Maintain IABP - c/w hep gtt  TTE noted  on DAPT  started on BB  will add afterload reduction prior to IABP removal if BP can tolerate  plan for staged PCI as out-pt    Patient is critically ill, requiring critical care services. Despite the current condition, the patient is at a high risk of clinical and hemodynamic demise Given cath findings in setting of STEMI CT Surgery Consult /w Dr. Garcia was placed- in light of continuous ischemia and refusal of the CABG, pt was taken back to the cath lab on 4/23 for the PCI to LAD  IABP for coronary perfusion  on Integrilin gtt until 2:30 pm  Maintain IABP - c/w hep gtt  TTE noted  on DAPT  started on BB  will add afterload reduction prior to IABP removal if BP can tolerate  plan for staged PCI as out-pt    Patient is critically ill, requiring critical care services. Despite the current condition, the patient is at a high risk of clinical and hemodynamic demise

## 2023-04-25 DIAGNOSIS — M25.512 PAIN IN LEFT SHOULDER: ICD-10-CM

## 2023-04-25 DIAGNOSIS — F17.200 NICOTINE DEPENDENCE, UNSPECIFIED, UNCOMPLICATED: ICD-10-CM

## 2023-04-25 DIAGNOSIS — I50.21 ACUTE SYSTOLIC (CONGESTIVE) HEART FAILURE: ICD-10-CM

## 2023-04-25 DIAGNOSIS — D72.829 ELEVATED WHITE BLOOD CELL COUNT, UNSPECIFIED: ICD-10-CM

## 2023-04-25 DIAGNOSIS — E11.9 TYPE 2 DIABETES MELLITUS WITHOUT COMPLICATIONS: ICD-10-CM

## 2023-04-25 DIAGNOSIS — Z29.9 ENCOUNTER FOR PROPHYLACTIC MEASURES, UNSPECIFIED: ICD-10-CM

## 2023-04-25 DIAGNOSIS — I25.5 ISCHEMIC CARDIOMYOPATHY: ICD-10-CM

## 2023-04-25 LAB
ALBUMIN SERPL ELPH-MCNC: 3.5 G/DL — SIGNIFICANT CHANGE UP (ref 3.3–5)
ALP SERPL-CCNC: 75 U/L — SIGNIFICANT CHANGE UP (ref 40–120)
ALT FLD-CCNC: 21 U/L — SIGNIFICANT CHANGE UP (ref 10–45)
ANION GAP SERPL CALC-SCNC: 13 MMOL/L — SIGNIFICANT CHANGE UP (ref 5–17)
APTT BLD: 30.6 SEC — SIGNIFICANT CHANGE UP (ref 27.5–35.5)
APTT BLD: 74.4 SEC — HIGH (ref 27.5–35.5)
AST SERPL-CCNC: 112 U/L — HIGH (ref 10–40)
BILIRUB SERPL-MCNC: 0.9 MG/DL — SIGNIFICANT CHANGE UP (ref 0.2–1.2)
BUN SERPL-MCNC: 15 MG/DL — SIGNIFICANT CHANGE UP (ref 7–23)
CALCIUM SERPL-MCNC: 8.5 MG/DL — SIGNIFICANT CHANGE UP (ref 8.4–10.5)
CHLORIDE SERPL-SCNC: 103 MMOL/L — SIGNIFICANT CHANGE UP (ref 96–108)
CK MB BLD-MCNC: 1.3 % — SIGNIFICANT CHANGE UP (ref 0–3.5)
CK MB CFR SERPL CALC: 14.4 NG/ML — HIGH (ref 0–6.7)
CK SERPL-CCNC: 1127 U/L — HIGH (ref 30–200)
CO2 SERPL-SCNC: 18 MMOL/L — LOW (ref 22–31)
CREAT SERPL-MCNC: 0.95 MG/DL — SIGNIFICANT CHANGE UP (ref 0.5–1.3)
EGFR: 92 ML/MIN/1.73M2 — SIGNIFICANT CHANGE UP
GLUCOSE BLDC GLUCOMTR-MCNC: 128 MG/DL — HIGH (ref 70–99)
GLUCOSE BLDC GLUCOMTR-MCNC: 138 MG/DL — HIGH (ref 70–99)
GLUCOSE BLDC GLUCOMTR-MCNC: 140 MG/DL — HIGH (ref 70–99)
GLUCOSE BLDC GLUCOMTR-MCNC: 253 MG/DL — HIGH (ref 70–99)
GLUCOSE SERPL-MCNC: 152 MG/DL — HIGH (ref 70–99)
HCT VFR BLD CALC: 34.8 % — LOW (ref 39–50)
HGB BLD-MCNC: 11.7 G/DL — LOW (ref 13–17)
INR BLD: 1.19 RATIO — HIGH (ref 0.88–1.16)
LACTATE SERPL-SCNC: 1 MMOL/L — SIGNIFICANT CHANGE UP (ref 0.5–2)
LACTATE SERPL-SCNC: 1 MMOL/L — SIGNIFICANT CHANGE UP (ref 0.5–2)
MAGNESIUM SERPL-MCNC: 2 MG/DL — SIGNIFICANT CHANGE UP (ref 1.6–2.6)
MCHC RBC-ENTMCNC: 29.8 PG — SIGNIFICANT CHANGE UP (ref 27–34)
MCHC RBC-ENTMCNC: 33.6 GM/DL — SIGNIFICANT CHANGE UP (ref 32–36)
MCV RBC AUTO: 88.8 FL — SIGNIFICANT CHANGE UP (ref 80–100)
NRBC # BLD: 0 /100 WBCS — SIGNIFICANT CHANGE UP (ref 0–0)
PHOSPHATE SERPL-MCNC: 2.6 MG/DL — SIGNIFICANT CHANGE UP (ref 2.5–4.5)
PLATELET # BLD AUTO: 173 K/UL — SIGNIFICANT CHANGE UP (ref 150–400)
POTASSIUM SERPL-MCNC: 4 MMOL/L — SIGNIFICANT CHANGE UP (ref 3.5–5.3)
POTASSIUM SERPL-SCNC: 4 MMOL/L — SIGNIFICANT CHANGE UP (ref 3.5–5.3)
PROT SERPL-MCNC: 6.4 G/DL — SIGNIFICANT CHANGE UP (ref 6–8.3)
PROTHROM AB SERPL-ACNC: 13.7 SEC — HIGH (ref 10.5–13.4)
RBC # BLD: 3.92 M/UL — LOW (ref 4.2–5.8)
RBC # FLD: 13 % — SIGNIFICANT CHANGE UP (ref 10.3–14.5)
SODIUM SERPL-SCNC: 134 MMOL/L — LOW (ref 135–145)
TROPONIN T, HIGH SENSITIVITY RESULT: 3340 NG/L — HIGH (ref 0–51)
UFH PPP CHRO-ACNC: 0.02 IU/ML — LOW (ref 0.3–0.7)
WBC # BLD: 15.45 K/UL — HIGH (ref 3.8–10.5)
WBC # FLD AUTO: 15.45 K/UL — HIGH (ref 3.8–10.5)

## 2023-04-25 PROCEDURE — 71045 X-RAY EXAM CHEST 1 VIEW: CPT | Mod: 26

## 2023-04-25 PROCEDURE — 93010 ELECTROCARDIOGRAM REPORT: CPT | Mod: 76

## 2023-04-25 PROCEDURE — 33968 REMOVE AORTIC ASSIST DEVICE: CPT

## 2023-04-25 PROCEDURE — 99223 1ST HOSP IP/OBS HIGH 75: CPT | Mod: 25

## 2023-04-25 PROCEDURE — 99291 CRITICAL CARE FIRST HOUR: CPT | Mod: GC,25

## 2023-04-25 PROCEDURE — 99406 BEHAV CHNG SMOKING 3-10 MIN: CPT

## 2023-04-25 PROCEDURE — 99222 1ST HOSP IP/OBS MODERATE 55: CPT | Mod: 25

## 2023-04-25 RX ORDER — HEPARIN SODIUM 5000 [USP'U]/ML
5000 INJECTION INTRAVENOUS; SUBCUTANEOUS EVERY 8 HOURS
Refills: 0 | Status: DISCONTINUED | OUTPATIENT
Start: 2023-04-26 | End: 2023-04-28

## 2023-04-25 RX ORDER — OXYCODONE HYDROCHLORIDE 5 MG/1
5 TABLET ORAL ONCE
Refills: 0 | Status: DISCONTINUED | OUTPATIENT
Start: 2023-04-25 | End: 2023-04-25

## 2023-04-25 RX ORDER — LIDOCAINE 4 G/100G
1 CREAM TOPICAL ONCE
Refills: 0 | Status: COMPLETED | OUTPATIENT
Start: 2023-04-25 | End: 2023-04-25

## 2023-04-25 RX ORDER — SODIUM CHLORIDE 9 MG/ML
500 INJECTION, SOLUTION INTRAVENOUS ONCE
Refills: 0 | Status: COMPLETED | OUTPATIENT
Start: 2023-04-25 | End: 2023-04-25

## 2023-04-25 RX ORDER — ALBUMIN HUMAN 25 %
250 VIAL (ML) INTRAVENOUS ONCE
Refills: 0 | Status: COMPLETED | OUTPATIENT
Start: 2023-04-25 | End: 2023-04-25

## 2023-04-25 RX ORDER — ACETAMINOPHEN 500 MG
1000 TABLET ORAL ONCE
Refills: 0 | Status: COMPLETED | OUTPATIENT
Start: 2023-04-25 | End: 2023-04-25

## 2023-04-25 RX ORDER — FENTANYL CITRATE 50 UG/ML
25 INJECTION INTRAVENOUS ONCE
Refills: 0 | Status: DISCONTINUED | OUTPATIENT
Start: 2023-04-25 | End: 2023-04-25

## 2023-04-25 RX ADMIN — TICAGRELOR 90 MILLIGRAM(S): 90 TABLET ORAL at 05:20

## 2023-04-25 RX ADMIN — Medication 1000 MILLIGRAM(S): at 10:45

## 2023-04-25 RX ADMIN — Medication 1000 MILLIGRAM(S): at 16:52

## 2023-04-25 RX ADMIN — Medication 400 MILLIGRAM(S): at 01:23

## 2023-04-25 RX ADMIN — LIDOCAINE 1 PATCH: 4 CREAM TOPICAL at 19:16

## 2023-04-25 RX ADMIN — OXYCODONE HYDROCHLORIDE 5 MILLIGRAM(S): 5 TABLET ORAL at 06:21

## 2023-04-25 RX ADMIN — Medication 166.67 MILLILITER(S): at 08:03

## 2023-04-25 RX ADMIN — Medication 400 MILLIGRAM(S): at 16:18

## 2023-04-25 RX ADMIN — Medication 81 MILLIGRAM(S): at 12:53

## 2023-04-25 RX ADMIN — Medication 12.5 MILLIGRAM(S): at 05:20

## 2023-04-25 RX ADMIN — OXYCODONE HYDROCHLORIDE 5 MILLIGRAM(S): 5 TABLET ORAL at 06:50

## 2023-04-25 RX ADMIN — Medication 6: at 12:52

## 2023-04-25 RX ADMIN — SODIUM CHLORIDE 1000 MILLILITER(S): 9 INJECTION, SOLUTION INTRAVENOUS at 17:39

## 2023-04-25 RX ADMIN — CAPTOPRIL 6.25 MILLIGRAM(S): 12.5 TABLET ORAL at 16:18

## 2023-04-25 RX ADMIN — FENTANYL CITRATE 25 MICROGRAM(S): 50 INJECTION INTRAVENOUS at 12:45

## 2023-04-25 RX ADMIN — CAPTOPRIL 6.25 MILLIGRAM(S): 12.5 TABLET ORAL at 05:33

## 2023-04-25 RX ADMIN — Medication 1000 MILLIGRAM(S): at 02:00

## 2023-04-25 RX ADMIN — LIDOCAINE 1 PATCH: 4 CREAM TOPICAL at 21:50

## 2023-04-25 RX ADMIN — ATORVASTATIN CALCIUM 80 MILLIGRAM(S): 80 TABLET, FILM COATED ORAL at 21:52

## 2023-04-25 RX ADMIN — TICAGRELOR 90 MILLIGRAM(S): 90 TABLET ORAL at 17:39

## 2023-04-25 RX ADMIN — FENTANYL CITRATE 25 MICROGRAM(S): 50 INJECTION INTRAVENOUS at 11:56

## 2023-04-25 RX ADMIN — LIDOCAINE 1 PATCH: 4 CREAM TOPICAL at 09:48

## 2023-04-25 RX ADMIN — Medication 400 MILLIGRAM(S): at 10:20

## 2023-04-25 NOTE — PROGRESS NOTE ADULT - ATTENDING COMMENTS
Admitted with anterior wall STEMI s/p PCI requiring intra-aortic balloon pump for ongoing symptoms  DAPT with ASA, Ticagrelor   Lipitor 80  Hemodynamics acceptable, chest pain free on IABP, weaned overnight - remove IABP, continue beta blocker  TTE with severely reduced LVEF - transition Captopril to Losartan  O2 sats mid to high 90s on nasal cannula - wean to room air  Normal renal function, compensated on exam - target even   H/H acceptable - on Heparin drip for IABP  COVID negative, no antibiotics   Sugars controlled  IABP - will remove

## 2023-04-25 NOTE — PROCEDURE NOTE - ADDITIONAL PROCEDURE DETAILS
PROCEDURE NOTE  REMOVAL OF INTRA AORTIC BALLOON PUMP    The IABP (intra-aortic balloon pump) was weaned according to protocol.  Hemodynamics remained stable.  Pulses in the        right       lower extremity are palpable/audible by doppler.  The patient was placed in the supine position.  The insertion site was identified and the sutures were removed.  The IABP was turned off and the balloon deflated.  The IABP was then removed.  Direct pressure was applied for       right        minutes.  A sandbag was applied and is  to remain in place for three hours.    Monitoring of the    right         groin and both lower extremities including neuro-vascular checks and vital signs every 15 minutes  x4, then every 30 minutes x 2, then every 1 hr x 4 was ordered.

## 2023-04-25 NOTE — PROGRESS NOTE ADULT - PROBLEM SELECTOR PLAN 4
Hx of DM2. At home, reportedly took Metformin 500mg intermittently when eating poorly  - Last HbA1c (4/23/23): 7.1%  - c/w mod ISS for now  - monitor FS qAC and qHS Reported having severe L shoulder pain, but noted that it is a chronic issue that occurs intermittently and typically relieved by NSAIDs. Possibly 2/2 MSK etiology vs ?gout vs related to pt's CAD/TVD.  - unclear at this time if cause of uptrending leukocytosis.  - f/u L shoulder XR  - pain control PRN

## 2023-04-25 NOTE — PROGRESS NOTE ADULT - PROBLEM SELECTOR PLAN 1
Presented with AWSTEMI, s/p LHC (4/23/23 AM) and found to have TVD, s/p IABP placement and removal. Evaluated for CABG, but pt/family opted for LHC with high risk PCI (4/23/23 PM), now s/p DESx2 to pLAD and mLAD.  - c/w DAPT (ASA/brilinta), atorvastatin  - c/w GDMT: lopressor 12.5mg BID; captopril was dc'ed in CICU due to labile BPs, but will consider adding Losartan if BP stable  - troponins downtrending  - keep K>4, Mg>2 Presented with AWSTEMI, s/p LHC (4/23/23 AM) and found to have TVD, s/p IABP placement and removal. Evaluated for CABG, but pt/family opted for LHC with high risk PCI (4/23/23 PM), now s/p DESx2 to pLAD and mLAD.  - c/w DAPT (ASA/brilinta), atorvastatin  - c/w GDMT: lopressor 12.5mg BID; captopril was dc'ed in CICU due to labile BPs, but will consider adding Losartan if BP stable  - hsTrop downtrending  - keep K>4, Mg>2  - advised on importance of medication compliance and lifestyle changes including diet, exercise, and smoking cessation  - Per interventional cardiology, plan for staged PCI of OM1/LCx as an outpatient.  - f/u further Cardiology recs

## 2023-04-25 NOTE — PROGRESS NOTE ADULT - SUBJECTIVE AND OBJECTIVE BOX
CHIEF COMPLAINT: Chest Pain    HPI:  62M PMH of CAD with 1 stent in 2013 to LAD and DM2 p/w intermittent chest pain which was alleviated by resting and sitting down since noon. States CP has been getting progressively worse around midnight which became persistent, patient took 2 baby ASA, and went to the ED when unable to sleep 2/2 pain. States the pain was 9/10, described as squeezing substernal CP.  Denies any shortness of breath fever chills upper respiratory symptoms. In ED found with STEMI with KASEY V1,V2,V3 with +CE. ASA and Brilinta loaded, started on heparin gtt and transferred to [facility] for LHC. In Cath lab (on 4/23/23) found with severe triple vessel disease (OM1, pLAD, and Cx disease) with vgram 20-25%. IABP placed. States now CP improved, mild chest pressure 1/10. Of note patient states has not seen physician in a few years. Recently stopped his simvastatin 1 week ago himself in which prior he was intermittently taking 20mg.  (23 Apr 2023 05:40)    CICU Course: Initially evaluated by CTS for CABG, but pt/family refused and opted for high risk PCI instead. s/p LHC high risk PCI on 4/23/23 with DESx2 to pLAD and mLAD. IABP removed and hep gtt stopped on 4/25/23. Per interventional cardiology, plan for staged PCI of OM1/LCx as an outpatient. Of note, TTE from 4/24/23 showed EF 33% w/o LV thrombus, multiple segmental abnormalities/WMAs, trace MR. Started on Lopressor for GDMT; Losartan was not started due to labile BPs down to SBP 80s-90s. Transferred to Medicine/Telemetry for further management.    On Medicine floors, pt seen and examined.      PAST MEDICAL & SURGICAL HISTORY:  CAD (coronary artery disease)  Diabetes  No significant past surgical history    FAMILY HISTORY:  FH: CAD (coronary artery disease) (Father)  FH: type 2 diabetes (Mother, Sibling)    SOCIAL HISTORY:  Patient lives at home with wife and children. Works as a . No issues with ADLs at home. Denies any ETOH consumption or drug use. States currently smokes 4-5 cigs as day for last 25 years    Allergies  No Known Allergies    HOME MEDICATIONS:    REVIEW OF SYSTEMS:  CONSTITUTIONAL: No generalized weakness, fevers, or chills  EYES:  No visual changes or eye pain  ENMT: No hearing loss or sore throat  RESPIRATORY: No cough, wheezing, hemoptysis; No shortness of breath  CARDIOVASCULAR: No chest pain or palpitations  GASTROINTESTINAL: No abdominal or epigastric pain; No nausea, vomiting, or hematemesis; No diarrhea or constipation; No melena or hematochezia  GENITOURINARY: No dysuria, frequency or hematuria  MUSCULOSKELETAL: No joint pain or swelling; No muscle, back, or extremity pain  NEUROLOGICAL: No headaches; No numbness or loss of sensation; No loss of strength in extremities  PSYCHIATRIC: No anxiety or depression  SKIN: No itching, burning, rashes, or lesions   All other review of systems is negative unless indicated above.    OBJECTIVE:  ICU Vital Signs Last 24 Hrs  T(C): 36.8 (25 Apr 2023 15:00), Max: 37.3 (24 Apr 2023 23:00)  T(F): 98.2 (25 Apr 2023 15:00), Max: 99.2 (25 Apr 2023 03:00)  HR: 81 (25 Apr 2023 19:00) (67 - 107)  BP: 96/59 (25 Apr 2023 19:00) (88/56 - 115/75)  BP(mean): 73 (25 Apr 2023 19:00) (67 - 90)  ABP: --  ABP(mean): --  RR: 28 (25 Apr 2023 19:00) (17 - 34)  SpO2: 96% (25 Apr 2023 19:00) (94% - 97%)    O2 Parameters below as of 25 Apr 2023 19:00  Patient On (Oxygen Delivery Method): room air      POCT Blood Glucose.: 138 mg/dL (25 Apr 2023 17:37)      PHYSICAL EXAM:  Vital Signs Last 24 Hrs  T(C): 36.8 (25 Apr 2023 15:00), Max: 37.3 (24 Apr 2023 23:00)  T(F): 98.2 (25 Apr 2023 15:00), Max: 99.2 (25 Apr 2023 03:00)  HR: 81 (25 Apr 2023 19:00) (67 - 107)  BP: 96/59 (25 Apr 2023 19:00) (88/56 - 115/75)  BP(mean): 73 (25 Apr 2023 19:00) (67 - 90)  RR: 28 (25 Apr 2023 19:00) (17 - 34)  SpO2: 96% (25 Apr 2023 19:00) (94% - 97%)    Parameters below as of 25 Apr 2023 19:00  Patient On (Oxygen Delivery Method): room air    CONSTITUTIONAL: NAD, well-developed, well-groomed  EYES: PERRL; conjunctiva and sclera clear  ENMT: Moist oral mucosa, no pharyngeal exudates  NECK: Supple, no palpable masses  RESPIRATORY: Normal respiratory effort; lungs are clear to auscultation bilaterally; No wheezes or rales  CARDIOVASCULAR: Regular rate and rhythm; Normal S1 and S2; No murmurs, rubs, or gallops; No lower extremity edema; Peripheral pulses are 2+ bilaterally  ABDOMEN: Soft, Nontender, Nondistended; Bowel sounds present  MUSCULOSKELETAL:  No clubbing or cyanosis of digits; No joint swelling or tenderness to palpation  PSYCH: AAOx3 (oriented to person, place, and time); affect appropriate  NEUROLOGY: CN II-XII are intact and symmetric; no gross sensory deficits  SKIN: No rashes; No palpable lesions    HOSPITAL MEDICATIONS:  MEDICATIONS  (STANDING):  aspirin enteric coated 81 milliGRAM(s) Oral daily  atorvastatin 80 milliGRAM(s) Oral at bedtime  coronavirus bivalent (EUA) Booster Vaccine (PFIZER) 0.3 milliLiter(s) IntraMuscular once  insulin lispro (ADMELOG) corrective regimen sliding scale   SubCutaneous at bedtime  insulin lispro (ADMELOG) corrective regimen sliding scale   SubCutaneous three times a day before meals  metoprolol tartrate 12.5 milliGRAM(s) Oral two times a day  ticagrelor 90 milliGRAM(s) Oral every 12 hours    MEDICATIONS  (PRN):  aluminum hydroxide/magnesium hydroxide/simethicone Suspension 30 milliLiter(s) Oral once PRN Dyspepsia      LABS:                        11.7   15.45 )-----------( 173      ( 25 Apr 2023 03:06 )             34.8     04-25    134<L>  |  103  |  15  ----------------------------<  152<H>  4.0   |  18<L>  |  0.95    Ca    8.5      25 Apr 2023 03:06  Phos  2.6     04-25  Mg     2.0     04-25    TPro  6.4  /  Alb  3.5  /  TBili  0.9  /  DBili  x   /  AST  112<H>  /  ALT  21  /  AlkPhos  75  04-25    PT/INR - ( 25 Apr 2023 03:06 )   PT: 13.7 sec;   INR: 1.19 ratio         PTT - ( 25 Apr 2023 09:12 )  PTT:30.6 sec          MICROBIOLOGY:     RADIOLOGY:  [ ] Reviewed and interpreted by me    EKG: MEDICINE ACCEPT NOTE    CHIEF COMPLAINT: Chest Pain    HPI:  62M PMH of CAD with 1 stent in 2013 to LAD and DM2 p/w intermittent chest pain which was alleviated by resting and sitting down since noon. States CP has been getting progressively worse around midnight which became persistent, patient took 2 baby ASA, and went to the ED when unable to sleep 2/2 pain. States the pain was 9/10, described as squeezing substernal CP.  Denies any shortness of breath fever chills upper respiratory symptoms. In ED found with STEMI with KASEY V1,V2,V3 with +CE. ASA and Brilinta loaded, started on heparin gtt and transferred to [facility] for LHC. In Cath lab (on 4/23/23) found with severe triple vessel disease (OM1, pLAD, and Cx disease) with vgram 20-25%. IABP placed. States now CP improved, mild chest pressure 1/10. Of note patient states has not seen physician in a few years. Recently stopped his simvastatin 1 week ago himself in which prior he was intermittently taking 20mg.  (23 Apr 2023 05:40)    CICU Course: Initially evaluated by CTS for CABG, but pt/family refused and opted for high risk PCI instead. s/p LHC high risk PCI on 4/23/23 with DESx2 to pLAD and mLAD. IABP removed and hep gtt stopped on 4/25/23. Per interventional cardiology, plan for staged PCI of OM1/LCx as an outpatient. Of note, TTE from 4/24/23 showed EF 33% w/o LV thrombus, multiple segmental abnormalities/WMAs, trace MR. Started on Lopressor for GDMT; Losartan was not started due to labile BPs down to SBP 80s-90s. Transferred to Medicine/Telemetry for further management.    On Medicine floors, pt seen and examined.      PAST MEDICAL & SURGICAL HISTORY:  CAD (coronary artery disease)  Diabetes  No significant past surgical history    FAMILY HISTORY:  FH: CAD (coronary artery disease) (Father)  FH: type 2 diabetes (Mother, Sibling)    SOCIAL HISTORY:  Patient lives at home with wife and children. Works as a . No issues with ADLs at home. Denies any ETOH consumption or drug use. States currently smokes 4-5 cigs as day for last 25 years    Allergies  No Known Allergies    HOME MEDICATIONS:    REVIEW OF SYSTEMS:  CONSTITUTIONAL: No generalized weakness, fevers, or chills  EYES:  No visual changes or eye pain  ENMT: No hearing loss or sore throat  RESPIRATORY: No cough, wheezing, hemoptysis; No shortness of breath  CARDIOVASCULAR: No chest pain or palpitations  GASTROINTESTINAL: No abdominal or epigastric pain; No nausea, vomiting, or hematemesis; No diarrhea or constipation; No melena or hematochezia  GENITOURINARY: No dysuria, frequency or hematuria  MUSCULOSKELETAL: No joint pain or swelling; No muscle, back, or extremity pain  NEUROLOGICAL: No headaches; No numbness or loss of sensation; No loss of strength in extremities  PSYCHIATRIC: No anxiety or depression  SKIN: No itching, burning, rashes, or lesions   All other review of systems is negative unless indicated above.    OBJECTIVE:  ICU Vital Signs Last 24 Hrs  T(C): 36.8 (25 Apr 2023 15:00), Max: 37.3 (24 Apr 2023 23:00)  T(F): 98.2 (25 Apr 2023 15:00), Max: 99.2 (25 Apr 2023 03:00)  HR: 81 (25 Apr 2023 19:00) (67 - 107)  BP: 96/59 (25 Apr 2023 19:00) (88/56 - 115/75)  BP(mean): 73 (25 Apr 2023 19:00) (67 - 90)  ABP: --  ABP(mean): --  RR: 28 (25 Apr 2023 19:00) (17 - 34)  SpO2: 96% (25 Apr 2023 19:00) (94% - 97%)    O2 Parameters below as of 25 Apr 2023 19:00  Patient On (Oxygen Delivery Method): room air      POCT Blood Glucose.: 138 mg/dL (25 Apr 2023 17:37)      PHYSICAL EXAM:  Vital Signs Last 24 Hrs  T(C): 36.8 (25 Apr 2023 15:00), Max: 37.3 (24 Apr 2023 23:00)  T(F): 98.2 (25 Apr 2023 15:00), Max: 99.2 (25 Apr 2023 03:00)  HR: 81 (25 Apr 2023 19:00) (67 - 107)  BP: 96/59 (25 Apr 2023 19:00) (88/56 - 115/75)  BP(mean): 73 (25 Apr 2023 19:00) (67 - 90)  RR: 28 (25 Apr 2023 19:00) (17 - 34)  SpO2: 96% (25 Apr 2023 19:00) (94% - 97%)    Parameters below as of 25 Apr 2023 19:00  Patient On (Oxygen Delivery Method): room air    CONSTITUTIONAL: NAD, well-developed, well-groomed  EYES: PERRL; conjunctiva and sclera clear  ENMT: Moist oral mucosa, no pharyngeal exudates  NECK: Supple, no palpable masses  RESPIRATORY: Normal respiratory effort; lungs are clear to auscultation bilaterally; No wheezes or rales  CARDIOVASCULAR: Regular rate and rhythm; Normal S1 and S2; No murmurs, rubs, or gallops; No lower extremity edema; Peripheral pulses are 2+ bilaterally  ABDOMEN: Soft, Nontender, Nondistended; Bowel sounds present  MUSCULOSKELETAL:  No clubbing or cyanosis of digits; No joint swelling or tenderness to palpation  PSYCH: AAOx3 (oriented to person, place, and time); affect appropriate  NEUROLOGY: CN II-XII are intact and symmetric; no gross sensory deficits  SKIN: No rashes; No palpable lesions    HOSPITAL MEDICATIONS:  MEDICATIONS  (STANDING):  aspirin enteric coated 81 milliGRAM(s) Oral daily  atorvastatin 80 milliGRAM(s) Oral at bedtime  coronavirus bivalent (EUA) Booster Vaccine (PFIZER) 0.3 milliLiter(s) IntraMuscular once  insulin lispro (ADMELOG) corrective regimen sliding scale   SubCutaneous at bedtime  insulin lispro (ADMELOG) corrective regimen sliding scale   SubCutaneous three times a day before meals  metoprolol tartrate 12.5 milliGRAM(s) Oral two times a day  ticagrelor 90 milliGRAM(s) Oral every 12 hours    MEDICATIONS  (PRN):  aluminum hydroxide/magnesium hydroxide/simethicone Suspension 30 milliLiter(s) Oral once PRN Dyspepsia      LABS:                        11.7   15.45 )-----------( 173      ( 25 Apr 2023 03:06 )             34.8     04-25    134<L>  |  103  |  15  ----------------------------<  152<H>  4.0   |  18<L>  |  0.95    Ca    8.5      25 Apr 2023 03:06  Phos  2.6     04-25  Mg     2.0     04-25    TPro  6.4  /  Alb  3.5  /  TBili  0.9  /  DBili  x   /  AST  112<H>  /  ALT  21  /  AlkPhos  75  04-25    PT/INR - ( 25 Apr 2023 03:06 )   PT: 13.7 sec;   INR: 1.19 ratio         PTT - ( 25 Apr 2023 09:12 )  PTT:30.6 sec          MICROBIOLOGY:     RADIOLOGY:  [ ] Reviewed and interpreted by me    EKG: MEDICINE ACCEPT NOTE    CHIEF COMPLAINT: Chest Pain    HPI:  62M PMH of CAD with 1 stent in 2013 to LAD and DM2 p/w intermittent chest pain which was alleviated by resting and sitting down since noon. States CP has been getting progressively worse around midnight which became persistent, patient took 2 baby ASA, and went to the ED when unable to sleep 2/2 pain. States the pain was 9/10, described as squeezing substernal CP.  Denies any shortness of breath fever chills upper respiratory symptoms. In ED found with STEMI with KASEY V1,V2,V3 with +CE. ASA and Brilinta loaded, started on heparin gtt and transferred to [facility] for LHC. In Cath lab (on 4/23/23) found with severe triple vessel disease (OM1, pLAD, and Cx disease) with vgram 20-25%. IABP placed. States now CP improved, mild chest pressure 1/10. Of note patient states has not seen physician in a few years. Recently stopped his simvastatin 1 week ago himself in which prior he was intermittently taking 20mg.  (23 Apr 2023 05:40)    CICU Course: Initially evaluated by CTS for CABG, but pt/family refused and opted for high risk PCI instead. s/p LHC high risk PCI on 4/23/23 with DESx2 to pLAD and mLAD. IABP removed and hep gtt stopped on 4/25/23. Per interventional cardiology, plan for staged PCI of OM1/LCx as an outpatient. Of note, TTE from 4/24/23 showed EF 33% w/o LV thrombus, multiple segmental abnormalities/WMAs, trace MR. Started on Lopressor for GDMT; Losartan was not started due to labile BPs down to SBP 80s-90s. Transferred to Medicine/Telemetry for further management.    On Medicine floors, pt seen and examined.      PAST MEDICAL & SURGICAL HISTORY:  CAD (coronary artery disease)  Diabetes  No significant past surgical history    FAMILY HISTORY:  FH: CAD (coronary artery disease) (Father)  FH: type 2 diabetes (Mother, Sibling)    SOCIAL HISTORY:  Patient lives at home with wife and children. Works as a . No issues with ADLs at home. Denies any ETOH consumption or drug use. States currently smokes 4-5 cigs as day for last 25 years    Allergies  No Known Allergies    HOME MEDICATIONS:    REVIEW OF SYSTEMS:  CONSTITUTIONAL: No generalized weakness, fevers, or chills  EYES:  No visual changes or eye pain  ENMT: No hearing loss or sore throat  RESPIRATORY: No cough, wheezing, hemoptysis; No shortness of breath  CARDIOVASCULAR: No chest pain or palpitations  GASTROINTESTINAL: No abdominal or epigastric pain; No nausea, vomiting, or hematemesis; No diarrhea or constipation; No melena or hematochezia  GENITOURINARY: No dysuria, frequency or hematuria  MUSCULOSKELETAL: No joint pain or swelling; No muscle, back, or extremity pain  NEUROLOGICAL: No headaches; No numbness or loss of sensation; No loss of strength in extremities  PSYCHIATRIC: No anxiety or depression  SKIN: No itching, burning, rashes, or lesions   All other review of systems is negative unless indicated above.    OBJECTIVE:  ICU Vital Signs Last 24 Hrs  T(C): 36.8 (25 Apr 2023 15:00), Max: 37.3 (24 Apr 2023 23:00)  T(F): 98.2 (25 Apr 2023 15:00), Max: 99.2 (25 Apr 2023 03:00)  HR: 81 (25 Apr 2023 19:00) (67 - 107)  BP: 96/59 (25 Apr 2023 19:00) (88/56 - 115/75)  BP(mean): 73 (25 Apr 2023 19:00) (67 - 90)  ABP: --  ABP(mean): --  RR: 28 (25 Apr 2023 19:00) (17 - 34)  SpO2: 96% (25 Apr 2023 19:00) (94% - 97%)    O2 Parameters below as of 25 Apr 2023 19:00  Patient On (Oxygen Delivery Method): room air      POCT Blood Glucose.: 138 mg/dL (25 Apr 2023 17:37)      PHYSICAL EXAM:  Vital Signs Last 24 Hrs  T(C): 36.8 (25 Apr 2023 15:00), Max: 37.3 (24 Apr 2023 23:00)  T(F): 98.2 (25 Apr 2023 15:00), Max: 99.2 (25 Apr 2023 03:00)  HR: 81 (25 Apr 2023 19:00) (67 - 107)  BP: 96/59 (25 Apr 2023 19:00) (88/56 - 115/75)  BP(mean): 73 (25 Apr 2023 19:00) (67 - 90)  RR: 28 (25 Apr 2023 19:00) (17 - 34)  SpO2: 96% (25 Apr 2023 19:00) (94% - 97%)    Parameters below as of 25 Apr 2023 19:00  Patient On (Oxygen Delivery Method): room air    CONSTITUTIONAL: NAD, well-developed, well-groomed  EYES: PERRL; conjunctiva and sclera clear  ENMT: Moist oral mucosa, no pharyngeal exudates  NECK: Supple, no palpable masses  RESPIRATORY: Normal respiratory effort; lungs are clear to auscultation bilaterally; No wheezes or rales  CARDIOVASCULAR: Regular rate and rhythm; Normal S1 and S2; No murmurs, rubs, or gallops; No lower extremity edema; Peripheral pulses are 2+ bilaterally  ABDOMEN: Soft, Nontender, Nondistended; Bowel sounds present  MUSCULOSKELETAL:  No clubbing or cyanosis of digits; No joint swelling or tenderness to palpation  PSYCH: AAOx3 (oriented to person, place, and time); affect appropriate  NEUROLOGY: CN II-XII are intact and symmetric; no gross sensory deficits  SKIN: No rashes; No palpable lesions    HOSPITAL MEDICATIONS:  MEDICATIONS  (STANDING):  aspirin enteric coated 81 milliGRAM(s) Oral daily  atorvastatin 80 milliGRAM(s) Oral at bedtime  coronavirus bivalent (EUA) Booster Vaccine (PFIZER) 0.3 milliLiter(s) IntraMuscular once  insulin lispro (ADMELOG) corrective regimen sliding scale   SubCutaneous at bedtime  insulin lispro (ADMELOG) corrective regimen sliding scale   SubCutaneous three times a day before meals  metoprolol tartrate 12.5 milliGRAM(s) Oral two times a day  ticagrelor 90 milliGRAM(s) Oral every 12 hours    MEDICATIONS  (PRN):  aluminum hydroxide/magnesium hydroxide/simethicone Suspension 30 milliLiter(s) Oral once PRN Dyspepsia      LABS:                        11.7   15.45 )-----------( 173      ( 25 Apr 2023 03:06 )             34.8     04-25    134<L>  |  103  |  15  ----------------------------<  152<H>  4.0   |  18<L>  |  0.95    Ca    8.5      25 Apr 2023 03:06  Phos  2.6     04-25  Mg     2.0     04-25    TPro  6.4  /  Alb  3.5  /  TBili  0.9  /  DBili  x   /  AST  112<H>  /  ALT  21  /  AlkPhos  75  04-25    PT/INR - ( 25 Apr 2023 03:06 )   PT: 13.7 sec;   INR: 1.19 ratio         PTT - ( 25 Apr 2023 09:12 )  PTT:30.6 sec          MICROBIOLOGY:     RADIOLOGY:  [ ] Reviewed and interpreted by me    EKG: MEDICINE ACCEPT NOTE    CHIEF COMPLAINT: Chest Pain    HPI:  62M PMH of CAD with 1 stent in 2013 to LAD and DM2 p/w intermittent chest pain which was alleviated by resting and sitting down since noon. States CP has been getting progressively worse around midnight which became persistent, patient took 2 baby ASA, and went to the ED when unable to sleep 2/2 pain. States the pain was 9/10, described as squeezing substernal CP.  Denies any shortness of breath fever chills upper respiratory symptoms. In ED found with STEMI with KASEY V1,V2,V3 with +CE. ASA and Brilinta loaded, started on heparin gtt and transferred to [facility] for LHC. In Cath lab (on 4/23/23) found with severe triple vessel disease (OM1, pLAD, and Cx disease) with vgram 20-25%. IABP placed. States now CP improved, mild chest pressure 1/10. Of note patient states has not seen physician in a few years. Recently stopped his simvastatin 1 week ago himself in which prior he was intermittently taking 20mg.  (23 Apr 2023 05:40)    CICU Course: Initially evaluated by CTS for CABG, but pt/family refused and opted for high risk PCI instead. s/p LHC high risk PCI on 4/23/23 with DESx2 to pLAD and mLAD. IABP removed and hep gtt stopped on 4/25/23. Per interventional cardiology, plan for staged PCI of OM1/LCx as an outpatient. Of note, TTE from 4/24/23 showed EF 33% w/o LV thrombus, multiple segmental abnormalities/WMAs, trace MR. Started on Lopressor for GDMT; Losartan was not started due to labile BPs down to SBP 80s-90s. Transferred to Medicine/Telemetry for further management.    On Medicine floors, pt seen and examined. Pt reported CP has subsided. However, did note that he had episode of severe L shoulder pain last night, seems to worsen with arm movement. Stated it was a chronic issue that he has intermittently and would resolve with NSAID use (reported that tylenol was not particularly effective). Also endorsed a cough but attributed to smoking. When discussing low grade temps in CICU, pt/wife at bedside noted that pt is always warm at baseline. Denied f/c/n/v, SOB, abdominal pain, dysuria,diarrhea, constipation.      PAST MEDICAL & SURGICAL HISTORY:  CAD (coronary artery disease)  Diabetes  RLE bone fracture (no surgery)  No significant past surgical history    FAMILY HISTORY:  FH: CAD (coronary artery disease) (Father)  FH: type 2 diabetes (Mother, Sibling)    SOCIAL HISTORY:  Patient lives at home with wife and children. Works as a . No issues with ADLs at home. Denies any ETOH consumption or drug use. States currently smokes 4-5 cigs as day for last 25 years    Allergies  No Known Allergies    HOME MEDICATIONS:  ASA 81mg 3x/wk  Metformin 500mg intermittently  simvastatin 20mg (was taking intermittently, but stopped 1 wk prior to admission)    REVIEW OF SYSTEMS:  CONSTITUTIONAL: No fevers or chills  EYES:  No visual changes or eye pain  ENMT: No hearing loss or sore throat  RESPIRATORY: +chronic cough, No wheezing or hemoptysis; No shortness of breath  CARDIOVASCULAR: +subsided chest pain; no palpitations  GASTROINTESTINAL: No abdominal pain; No nausea, vomiting, or hematemesis; No diarrhea or constipation; No melena or hematochezia  GENITOURINARY: No dysuria or hematuria  MUSCULOSKELETAL: No joint swelling; +L shoulder pain  NEUROLOGICAL: No headache; No numbness or loss of sensation; No loss of strength in extremities  PSYCHIATRIC: No anxiety or depression  SKIN: No itching, burning, rashes, or lesions     OBJECTIVE:  ICU Vital Signs Last 24 Hrs  T(C): 36.8 (25 Apr 2023 15:00), Max: 37.3 (24 Apr 2023 23:00)  T(F): 98.2 (25 Apr 2023 15:00), Max: 99.2 (25 Apr 2023 03:00)  HR: 81 (25 Apr 2023 19:00) (67 - 107)  BP: 96/59 (25 Apr 2023 19:00) (88/56 - 115/75)  BP(mean): 73 (25 Apr 2023 19:00) (67 - 90)  ABP: --  ABP(mean): --  RR: 28 (25 Apr 2023 19:00) (17 - 34)  SpO2: 96% (25 Apr 2023 19:00) (94% - 97%)    O2 Parameters below as of 25 Apr 2023 19:00  Patient On (Oxygen Delivery Method): room air      POCT Blood Glucose.: 138 mg/dL (25 Apr 2023 17:37)      PHYSICAL EXAM:  Vital Signs Last 24 Hrs  T(C): 36.8 (25 Apr 2023 15:00), Max: 37.3 (24 Apr 2023 23:00)  T(F): 98.2 (25 Apr 2023 15:00), Max: 99.2 (25 Apr 2023 03:00)  HR: 81 (25 Apr 2023 19:00) (67 - 107)  BP: 96/59 (25 Apr 2023 19:00) (88/56 - 115/75)  BP(mean): 73 (25 Apr 2023 19:00) (67 - 90)  RR: 28 (25 Apr 2023 19:00) (17 - 34)  SpO2: 96% (25 Apr 2023 19:00) (94% - 97%)    Parameters below as of 25 Apr 2023 19:00  Patient On (Oxygen Delivery Method): room air    CONSTITUTIONAL: NAD, well-developed, well-groomed  EYES: PERRL; conjunctiva and sclera clear  ENMT: Moist oral mucosa, no pharyngeal exudates  NECK: Supple, no palpable masses  RESPIRATORY: Normal respiratory effort; very mild bibasilar crackles, otherwise lungs are clear to auscultation bilaterally; No wheezes or rales  CARDIOVASCULAR: Regular rate and rhythm; Normal S1 and S2; No murmurs, rubs, or gallops; No lower extremity edema; Peripheral pulses are 2+ bilaterally  ABDOMEN: Soft, Nontender, Nondistended; Bowel sounds present  MUSCULOSKELETAL: No clubbing or cyanosis of digits; No joint swelling or tenderness to palpation; palpable irregularity along lower RLE (from prior RLE bone fx per pt); ?positive L-sided Boyd-Agustin test;   PSYCH: AAOx3 (oriented to person, place, and time); affect appropriate  NEUROLOGY: CN II-XII are intact and symmetric; no gross sensory deficits  SKIN: No rashes; No palpable lesions    HOSPITAL MEDICATIONS:  MEDICATIONS  (STANDING):  aspirin enteric coated 81 milliGRAM(s) Oral daily  atorvastatin 80 milliGRAM(s) Oral at bedtime  coronavirus bivalent (EUA) Booster Vaccine (PFIZER) 0.3 milliLiter(s) IntraMuscular once  insulin lispro (ADMELOG) corrective regimen sliding scale   SubCutaneous at bedtime  insulin lispro (ADMELOG) corrective regimen sliding scale   SubCutaneous three times a day before meals  metoprolol tartrate 12.5 milliGRAM(s) Oral two times a day  ticagrelor 90 milliGRAM(s) Oral every 12 hours    MEDICATIONS  (PRN):  aluminum hydroxide/magnesium hydroxide/simethicone Suspension 30 milliLiter(s) Oral once PRN Dyspepsia      LABS:                        11.7   15.45 )-----------( 173      ( 25 Apr 2023 03:06 )             34.8     04-25    134<L>  |  103  |  15  ----------------------------<  152<H>  4.0   |  18<L>  |  0.95    Ca    8.5      25 Apr 2023 03:06  Phos  2.6     04-25  Mg     2.0     04-25    TPro  6.4  /  Alb  3.5  /  TBili  0.9  /  DBili  x   /  AST  112<H>  /  ALT  21  /  AlkPhos  75  04-25    PT/INR - ( 25 Apr 2023 03:06 )   PT: 13.7 sec;   INR: 1.19 ratio         PTT - ( 25 Apr 2023 09:12 )  PTT:30.6 sec          MICROBIOLOGY: COVID-19 PCR (4/23/23) neg    EKG (4/24/23 AM) personally reviewed: NSR, HR 68, QTc 457, TWIs in leads I, II, aVL, V3-V6, no significant ST elevations    CXR (4/24/23 AM) personally reviewed: IABP marker noted. no focal consolidations appreciated    Repeat CXR and L shoulder XR ordered given pain/uptrending leukocytosis

## 2023-04-25 NOTE — PROGRESS NOTE ADULT - ASSESSMENT
62M w/ hx of current every day smoker, DM2, CAD s/p YADIRA to LAD (2013), presenting initially with CP, found to have AWSTEMI and admitted to CICU. s/p LHC and found to have TVD, s/p IABP placement and removal. Evaluated for CABG, but pt/family opted for LHC with high risk PCI, now s/p DESx2 to pLAD and mLAD (4/23/23). Initial TTE showed significantly reduced LVEF of 33%. Transferred to Medicine for further management.

## 2023-04-25 NOTE — CHART NOTE - NSCHARTNOTEFT_GEN_A_CORE
CICU Transfer Note    Transfer from: CICU    Transfer to: Telemetry    Accepting Physician: Dr. Edson Salguero  Signout given to: Dr. Salguero, Medicine ACP:     HPI: 62M PMH of CAD with 1 stent in 2013 to LAD and DM2 p/w intermittent chest pain which was alleviated by resting and sitting down since noon. States CP has been getting progressively worse around midnight which became persistent, patient took 2 baby ASA, and went to the ED when unable to sleep 2/2 pain. States the pain was 9/10, described as squeezing substernal CP.  Denies any shortness of breath fever chills upper respiratory symptoms. In ED found with STEMI with KASEY V1,V2,V3 with +CE. ASA and Brilinta loaded, started on heparin gtt and transferred to [facility] for LHC.    In Cath lab found with severe triple vessel disease (OM1, pLAD, and Cx disease) with vgram 20-25%. IABP placed. States now CP improved, mild chest pressure 1/10.     Of note patient states has not seen physician in a few years. Recently stopped his simvastatin 1 week ago himself in which prior he was intermittently taking 20mg.  (23 Apr 2023 05:40)    REVIEW OF SYSTEMS:  CONSTITUTIONAL: No weakness, fevers or chills  EYES/ENT: No visual changes;  No vertigo or throat pain   NECK: No pain or stiffness  RESPIRATORY: No cough, wheezing, hemoptysis; No shortness of breath  CARDIOVASCULAR: No chest pain or palpitations  GASTROINTESTINAL: No abdominal or epigastric pain  No nausea, vomiting, or hematemesis; No diarrhea or constipation. No melena or hematochezia.  GENITOURINARY: No dysuria, frequency or hematuria  NEUROLOGICAL: No numbness or weakness  SKIN: No itching, rashes    ICU Vital Signs Last 24 Hrs  T(C): 37.2 (24 Apr 2023 03:00), Max: 37.2 (24 Apr 2023 03:00)  T(F): 99 (24 Apr 2023 03:00), Max: 99 (24 Apr 2023 03:00)  HR: 61 (24 Apr 2023 07:00) (61 - 80)  BP: 122/74 (24 Apr 2023 07:00) (93/66 - 132/69)  BP(mean): 93 (24 Apr 2023 07:00) (76 - 93)  RR: 25 (24 Apr 2023 07:00) (18 - 36)  SpO2: 94% (24 Apr 2023 07:00) (93% - 98%)    O2 Parameters below as of 24 Apr 2023 07:00  Patient On (Oxygen Delivery Method): room air    O2 Parameters below as of 23 Apr 2023 16:00  Patient On (Oxygen Delivery Method): room air    CAPILLARY BLOOD GLUCOSE  POCT Blood Glucose.: 140 mg/dL (23 Apr 2023 13:42)    PHYSICAL EXAM:  GENERAL: No acute distress, well-developed  HEAD:  Atraumatic, Normocephalic  EYES: EOMI, PERRLA, conjunctiva and sclera clear  NECK: Supple, no lymphadenopathy, no JVD  CHEST/LUNG: CTAB; No wheezes, rales, or rhonchi  HEART: Regular rate and rhythm. Normal S1/S2. No murmurs, rubs, or gallops  ABDOMEN: Soft, non-tender, non-distended; normal bowel sounds, no organomegaly  EXTREMITIES:  2+ peripheral pulses b/l, No clubbing, cyanosis, or edema  NEUROLOGY: A&O x 3, no focal deficits  SKIN: Femoral site was CDI, no pain on palpation    ============================I/O===========================   I&O's Detail    23 Apr 2023 07:01  -  23 Apr 2023 19:59  --------------------------------------------------------  IN:    Heparin: 67.5 mL    Nitroglycerin: 93 mL    Oral Fluid: 240 mL  Total IN: 400.5 mL    OUT:    Voided (mL): 2150 mL  Total OUT: 2150 mL    Total NET: -1749.5 mL  ============================ LABS =========================                     13.8   11.85 )-----------( 293      ( 23 Apr 2023 03:40 )             41.3     04-23    137  |  105  |  12  ----------------------------<  143<H>  3.9   |  20<L>  |  0.77    Ca    9.2      23 Apr 2023 09:13  Phos  3.4     04-23  Mg     2.0     04-23    TPro  7.0  /  Alb  4.2  /  TBili  0.3  /  DBili  x   /  AST  112<H>  /  ALT  12  /  AlkPhos  81  04-23    Troponin T, High Sensitivity Result: 1587 ng/L (04-23-23 @ 12:04)  Troponin T, High Sensitivity Result: 1061 ng/L (04-23-23 @ 09:13)    CKMB Units: 189.7 ng/mL (04-23-23 @ 12:04)  CKMB Units: 145.3 ng/mL (04-23-23 @ 09:13)    Creatine Kinase, Serum: 2103 U/L (04-23-23 @ 12:04)  Creatine Kinase, Serum: 1498 U/L (04-23-23 @ 09:13)    CPK Mass Assay %: 9.0 % (04-23-23 @ 12:04)  CPK Mass Assay %: 9.7 % (04-23-23 @ 09:13)    LIVER FUNCTIONS - ( 23 Apr 2023 09:13 )  Alb: 4.2 g/dL / Pro: 7.0 g/dL / ALK PHOS: 81 U/L / ALT: 12 U/L / AST: 112 U/L / GGT: x           PT/INR - ( 23 Apr 2023 03:40 )   PT: 11.3 sec;   INR: 0.97 ratio    PTT - ( 23 Apr 2023 03:40 )  PTT:34.4 sec  ======================Micro/Rad/Cardio=================  Telemtry: Reviewed   EKG: Reviewed  CXR: Reviewed  Echo: Reviewed   Cath: Reviewed   ======================================================  PAST MEDICAL & SURGICAL HISTORY:  CAD (coronary artery disease)    Diabetes    No significant past surgical history    A/P: 62M PMH CAD (YADIRA to LAD '13) and DM p/w CP found with STEMI s.p LHC with TVD and IABP placement.     ====================== NEUROLOGY=====================  A&Ox4  - continue to monitor mental status as per protocol     ==================== RESPIRATORY======================  Comfortable on RA  - continue to monitor SpO2 with goal >94%    ====================CARDIOVASCULAR==================  TVD  - s/p [facility] 4/23 with severe CAD to pLAD, CX, OM1. IABP placed for coronary perfusion. Vgram 20-25%  - CTS eval (Dr. Marques) - family/patient refusing CTS, would like to proceed with high risk PCI  - s/p [facility] high risk PCI: YADIRA x 2 prox & mid LAD, integrillin started, completed   - c/w DAPT (ASA/Brilinta) s/p Brilinta load, Atorvastatin 80 mg  - GDMT: Lopressor 12.5, Captopril DC'd due to liable BP, consider adding ARB in AM if BP stable   - s/p IABP removal today at 1pm, groin site stable, ambulating  - Troponin trending down, lactate was wnl    Smoker  - smoking cessesation education ordered  - declining nicotine patch at this mayra    ===================HEMATOLOGIC/ONC ===================  h/h and plts wnl  - Monitor H/H and plts  - DVT PPX: Heparin SQ DVT PPX    ===================== RENAL =========================  SCr 1.1  - Continue monitoring urine output, lytes, SCr/ BUN  - replete lytes prn with goal K >4 and Mg >2    ==================== GASTROINTESTINAL===================  DASH carb consistent diet as frida  - monitor for regular BM while inpatient    =======================    ENDOCRINE  =====================  DM2  - states intermittently takes 500mg of metformin when eating poorly.   - Start mISS. Monitor FS with goal 100-180. Consider addition of basal/bolus insulin if above goal  - f/u hgb a1c    -f/u TSH and lipid panel   ========================INFECTIOUS DISEASE================  Afebrile, mild leukocytosis   - Leukocytosis likely reactive 2/2 STEMI. No s/s of infection   - monitor and trend WBC and temperature curve      FOR FOLLOW UP:  - GDMT as tolerates   - Cards to follow  - DC planning       JAYLYN Leone  [facility] [phone] CICU Transfer Note    Transfer from: CICU    Transfer to: Telemetry    Accepting Physician: Dr. Edson Salguero  Signout given to: Dr. Salguero, Medicine ACP: pending call back for report    HPI: 62M PMH of CAD with 1 stent in 2013 to LAD and DM2 p/w intermittent chest pain which was alleviated by resting and sitting down since noon. States CP has been getting progressively worse around midnight which became persistent, patient took 2 baby ASA, and went to the ED when unable to sleep 2/2 pain. States the pain was 9/10, described as squeezing substernal CP.  Denies any shortness of breath fever chills upper respiratory symptoms. In ED found with STEMI with KASEY V1,V2,V3 with +CE. ASA and Brilinta loaded, started on heparin gtt and transferred to [facility] for LHC.    In Cath lab found with severe triple vessel disease (OM1, pLAD, and Cx disease) with vgram 20-25%. IABP placed. States now CP improved, mild chest pressure 1/10.     Of note patient states has not seen physician in a few years. Recently stopped his simvastatin 1 week ago himself in which prior he was intermittently taking 20mg.  (23 Apr 2023 05:40)    REVIEW OF SYSTEMS:  CONSTITUTIONAL: No weakness, fevers or chills  EYES/ENT: No visual changes;  No vertigo or throat pain   NECK: No pain or stiffness  RESPIRATORY: No cough, wheezing, hemoptysis; No shortness of breath  CARDIOVASCULAR: No chest pain or palpitations  GASTROINTESTINAL: No abdominal or epigastric pain  No nausea, vomiting, or hematemesis; No diarrhea or constipation. No melena or hematochezia.  GENITOURINARY: No dysuria, frequency or hematuria  NEUROLOGICAL: No numbness or weakness  SKIN: No itching, rashes    ICU Vital Signs Last 24 Hrs  T(C): 37.2 (24 Apr 2023 03:00), Max: 37.2 (24 Apr 2023 03:00)  T(F): 99 (24 Apr 2023 03:00), Max: 99 (24 Apr 2023 03:00)  HR: 61 (24 Apr 2023 07:00) (61 - 80)  BP: 122/74 (24 Apr 2023 07:00) (93/66 - 132/69)  BP(mean): 93 (24 Apr 2023 07:00) (76 - 93)  RR: 25 (24 Apr 2023 07:00) (18 - 36)  SpO2: 94% (24 Apr 2023 07:00) (93% - 98%)    O2 Parameters below as of 24 Apr 2023 07:00  Patient On (Oxygen Delivery Method): room air    O2 Parameters below as of 23 Apr 2023 16:00  Patient On (Oxygen Delivery Method): room air    CAPILLARY BLOOD GLUCOSE  POCT Blood Glucose.: 140 mg/dL (23 Apr 2023 13:42)    PHYSICAL EXAM:  GENERAL: No acute distress, well-developed  HEAD:  Atraumatic, Normocephalic  EYES: EOMI, PERRLA, conjunctiva and sclera clear  NECK: Supple, no lymphadenopathy, no JVD  CHEST/LUNG: CTAB; No wheezes, rales, or rhonchi  HEART: Regular rate and rhythm. Normal S1/S2. No murmurs, rubs, or gallops  ABDOMEN: Soft, non-tender, non-distended; normal bowel sounds, no organomegaly  EXTREMITIES:  2+ peripheral pulses b/l, No clubbing, cyanosis, or edema  NEUROLOGY: A&O x 3, no focal deficits  SKIN: Femoral site was CDI, no pain on palpation    ============================I/O===========================   I&O's Detail    23 Apr 2023 07:01  -  23 Apr 2023 19:59  --------------------------------------------------------  IN:    Heparin: 67.5 mL    Nitroglycerin: 93 mL    Oral Fluid: 240 mL  Total IN: 400.5 mL    OUT:    Voided (mL): 2150 mL  Total OUT: 2150 mL    Total NET: -1749.5 mL  ============================ LABS =========================                     13.8   11.85 )-----------( 293      ( 23 Apr 2023 03:40 )             41.3     04-23    137  |  105  |  12  ----------------------------<  143<H>  3.9   |  20<L>  |  0.77    Ca    9.2      23 Apr 2023 09:13  Phos  3.4     04-23  Mg     2.0     04-23    TPro  7.0  /  Alb  4.2  /  TBili  0.3  /  DBili  x   /  AST  112<H>  /  ALT  12  /  AlkPhos  81  04-23    Troponin T, High Sensitivity Result: 1587 ng/L (04-23-23 @ 12:04)  Troponin T, High Sensitivity Result: 1061 ng/L (04-23-23 @ 09:13)    CKMB Units: 189.7 ng/mL (04-23-23 @ 12:04)  CKMB Units: 145.3 ng/mL (04-23-23 @ 09:13)    Creatine Kinase, Serum: 2103 U/L (04-23-23 @ 12:04)  Creatine Kinase, Serum: 1498 U/L (04-23-23 @ 09:13)    CPK Mass Assay %: 9.0 % (04-23-23 @ 12:04)  CPK Mass Assay %: 9.7 % (04-23-23 @ 09:13)    LIVER FUNCTIONS - ( 23 Apr 2023 09:13 )  Alb: 4.2 g/dL / Pro: 7.0 g/dL / ALK PHOS: 81 U/L / ALT: 12 U/L / AST: 112 U/L / GGT: x           PT/INR - ( 23 Apr 2023 03:40 )   PT: 11.3 sec;   INR: 0.97 ratio    PTT - ( 23 Apr 2023 03:40 )  PTT:34.4 sec  ======================Micro/Rad/Cardio=================  Telemtry: Reviewed   EKG: Reviewed  CXR: Reviewed  Echo: Reviewed   Cath: Reviewed   ======================================================  PAST MEDICAL & SURGICAL HISTORY:  CAD (coronary artery disease)    Diabetes    No significant past surgical history    A/P: 62M PMH CAD (YADIRA to LAD '13) and DM p/w CP found with STEMI s.p C with TVD and IABP placement.     ====================== NEUROLOGY=====================  A&Ox4  - continue to monitor mental status as per protocol     ==================== RESPIRATORY======================  Comfortable on RA  - continue to monitor SpO2 with goal >94%    ====================CARDIOVASCULAR==================  TVD  - s/p [facility] 4/23 with severe CAD to pLAD, CX, OM1. IABP placed for coronary perfusion. Vgram 20-25%  - CTS eval (Dr. Marques) - family/patient refusing CTS, would like to proceed with high risk PCI  - s/p [facility] high risk PCI: YADIRA x 2 prox & mid LAD, integrillin started, completed   - c/w DAPT (ASA/Brilinta) s/p Brilinta load, Atorvastatin 80 mg  - GDMT: Lopressor 12.5, Captopril DC'd due to liable BP, consider adding ARB in AM if BP stable   - s/p IABP removal today at 1pm, groin site stable, ambulating  - Troponin trending down, lactate was wnl    Smoker  - smoking cessesation education ordered  - declining nicotine patch at this mayra    ===================HEMATOLOGIC/ONC ===================  h/h and plts wnl  - Monitor H/H and plts  - DVT PPX: Heparin SQ DVT PPX    ===================== RENAL =========================  SCr 1.1  - Continue monitoring urine output, lytes, SCr/ BUN  - replete lytes prn with goal K >4 and Mg >2    ==================== GASTROINTESTINAL===================  DASH carb consistent diet as frida  - monitor for regular BM while inpatient    =======================    ENDOCRINE  =====================  DM2  - states intermittently takes 500mg of metformin when eating poorly.   - Start mISS. Monitor FS with goal 100-180. Consider addition of basal/bolus insulin if above goal  - f/u hgb a1c    -f/u TSH and lipid panel   ========================INFECTIOUS DISEASE================  Afebrile, mild leukocytosis   - Leukocytosis likely reactive 2/2 STEMI. No s/s of infection   - monitor and trend WBC and temperature curve      FOR FOLLOW UP:  - GDMT as tolerates   - Cards to follow  - DC planning       Delfina Blanca, [facility] [phone] CICU Transfer Note    Transfer from: CICU    Transfer to: Telemetry    Accepting Physician: Dr. Edson Salguero  Signout given to: Dr. Salguero, Medicine ACP: [phone]    HPI: 62M PMH of CAD with 1 stent in 2013 to LAD and DM2 p/w intermittent chest pain which was alleviated by resting and sitting down since noon. States CP has been getting progressively worse around midnight which became persistent, patient took 2 baby ASA, and went to the ED when unable to sleep 2/2 pain. States the pain was 9/10, described as squeezing substernal CP.  Denies any shortness of breath fever chills upper respiratory symptoms. In ED found with STEMI with KASEY V1,V2,V3 with +CE. ASA and Brilinta loaded, started on heparin gtt and transferred to [facility] for LHC.    In Cath lab found with severe triple vessel disease (OM1, pLAD, and Cx disease) with vgram 20-25%. IABP placed. States now CP improved, mild chest pressure 1/10.     Of note patient states has not seen physician in a few years. Recently stopped his simvastatin 1 week ago himself in which prior he was intermittently taking 20mg.  (23 Apr 2023 05:40)    REVIEW OF SYSTEMS:  CONSTITUTIONAL: No weakness, fevers or chills  EYES/ENT: No visual changes;  No vertigo or throat pain   NECK: No pain or stiffness  RESPIRATORY: No cough, wheezing, hemoptysis; No shortness of breath  CARDIOVASCULAR: No chest pain or palpitations  GASTROINTESTINAL: No abdominal or epigastric pain  No nausea, vomiting, or hematemesis; No diarrhea or constipation. No melena or hematochezia.  GENITOURINARY: No dysuria, frequency or hematuria  NEUROLOGICAL: No numbness or weakness  SKIN: No itching, rashes    ICU Vital Signs Last 24 Hrs  T(C): 37.2 (24 Apr 2023 03:00), Max: 37.2 (24 Apr 2023 03:00)  T(F): 99 (24 Apr 2023 03:00), Max: 99 (24 Apr 2023 03:00)  HR: 61 (24 Apr 2023 07:00) (61 - 80)  BP: 122/74 (24 Apr 2023 07:00) (93/66 - 132/69)  BP(mean): 93 (24 Apr 2023 07:00) (76 - 93)  RR: 25 (24 Apr 2023 07:00) (18 - 36)  SpO2: 94% (24 Apr 2023 07:00) (93% - 98%)    O2 Parameters below as of 24 Apr 2023 07:00  Patient On (Oxygen Delivery Method): room air    O2 Parameters below as of 23 Apr 2023 16:00  Patient On (Oxygen Delivery Method): room air    CAPILLARY BLOOD GLUCOSE  POCT Blood Glucose.: 140 mg/dL (23 Apr 2023 13:42)    PHYSICAL EXAM:  GENERAL: No acute distress, well-developed  HEAD:  Atraumatic, Normocephalic  EYES: EOMI, PERRLA, conjunctiva and sclera clear  NECK: Supple, no lymphadenopathy, no JVD  CHEST/LUNG: CTAB; No wheezes, rales, or rhonchi  HEART: Regular rate and rhythm. Normal S1/S2. No murmurs, rubs, or gallops  ABDOMEN: Soft, non-tender, non-distended; normal bowel sounds, no organomegaly  EXTREMITIES:  2+ peripheral pulses b/l, No clubbing, cyanosis, or edema  NEUROLOGY: A&O x 3, no focal deficits  SKIN: Femoral site was CDI, no pain on palpation    ============================I/O===========================   I&O's Detail    23 Apr 2023 07:01  -  23 Apr 2023 19:59  --------------------------------------------------------  IN:    Heparin: 67.5 mL    Nitroglycerin: 93 mL    Oral Fluid: 240 mL  Total IN: 400.5 mL    OUT:    Voided (mL): 2150 mL  Total OUT: 2150 mL    Total NET: -1749.5 mL  ============================ LABS =========================                     13.8   11.85 )-----------( 293      ( 23 Apr 2023 03:40 )             41.3     04-23    137  |  105  |  12  ----------------------------<  143<H>  3.9   |  20<L>  |  0.77    Ca    9.2      23 Apr 2023 09:13  Phos  3.4     04-23  Mg     2.0     04-23    TPro  7.0  /  Alb  4.2  /  TBili  0.3  /  DBili  x   /  AST  112<H>  /  ALT  12  /  AlkPhos  81  04-23    Troponin T, High Sensitivity Result: 1587 ng/L (04-23-23 @ 12:04)  Troponin T, High Sensitivity Result: 1061 ng/L (04-23-23 @ 09:13)    CKMB Units: 189.7 ng/mL (04-23-23 @ 12:04)  CKMB Units: 145.3 ng/mL (04-23-23 @ 09:13)    Creatine Kinase, Serum: 2103 U/L (04-23-23 @ 12:04)  Creatine Kinase, Serum: 1498 U/L (04-23-23 @ 09:13)    CPK Mass Assay %: 9.0 % (04-23-23 @ 12:04)  CPK Mass Assay %: 9.7 % (04-23-23 @ 09:13)    LIVER FUNCTIONS - ( 23 Apr 2023 09:13 )  Alb: 4.2 g/dL / Pro: 7.0 g/dL / ALK PHOS: 81 U/L / ALT: 12 U/L / AST: 112 U/L / GGT: x           PT/INR - ( 23 Apr 2023 03:40 )   PT: 11.3 sec;   INR: 0.97 ratio    PTT - ( 23 Apr 2023 03:40 )  PTT:34.4 sec  ======================Micro/Rad/Cardio=================  Telemtry: Reviewed   EKG: Reviewed  CXR: Reviewed  Echo: Reviewed   Cath: Reviewed   ======================================================  PAST MEDICAL & SURGICAL HISTORY:  CAD (coronary artery disease)    Diabetes    No significant past surgical history    A/P: 62M PMH CAD (YADIRA to LAD '13) and DM p/w CP found with STEMI s.p C with TVD and IABP placement.     ====================== NEUROLOGY=====================  A&Ox4  - continue to monitor mental status as per protocol     ==================== RESPIRATORY======================  Comfortable on RA  - continue to monitor SpO2 with goal >94%    ====================CARDIOVASCULAR==================  TVD  - s/p [facility] 4/23 with severe CAD to pLAD, CX, OM1. IABP placed for coronary perfusion. Vgram 20-25%  - CTS eval (Dr. Marques) - family/patient refusing CTS, would like to proceed with high risk PCI  - s/p [facility] high risk PCI: YADIRA x 2 prox & mid LAD, integrillin started, completed   - c/w DAPT (ASA/Brilinta) s/p Brilinta load, Atorvastatin 80 mg  - GDMT: Lopressor 12.5, Captopril DC'd due to liable BP, consider adding ARB in AM if BP stable   - s/p IABP removal today at 1pm, groin site stable, ambulating  - Troponin trending down, lactate was wnl    Smoker  - smoking cessesation education ordered  - declining nicotine patch at this mayra    ===================HEMATOLOGIC/ONC ===================  h/h and plts wnl  - Monitor H/H and plts  - DVT PPX: Heparin SQ DVT PPX    ===================== RENAL =========================  SCr 1.1  - Continue monitoring urine output, lytes, SCr/ BUN  - replete lytes prn with goal K >4 and Mg >2    ==================== GASTROINTESTINAL===================  DASH carb consistent diet as frida  - monitor for regular BM while inpatient    =======================    ENDOCRINE  =====================  DM2  - states intermittently takes 500mg of metformin when eating poorly.   - Start mISS. Monitor FS with goal 100-180. Consider addition of basal/bolus insulin if above goal  - f/u hgb a1c    -f/u TSH and lipid panel   ========================INFECTIOUS DISEASE================  Afebrile, mild leukocytosis   - Leukocytosis likely reactive 2/2 STEMI. No s/s of infection   - monitor and trend WBC and temperature curve      FOR FOLLOW UP:  - GDMT as tolerates   - Cards to follow  - DC planning       Delfina Blanca, [facility] [phone]

## 2023-04-25 NOTE — PROGRESS NOTE ADULT - ASSESSMENT
HPI:  62M PMH of CAD with 1 stent in 2013 to LAD and DM2 p/w intermittent chest pain which was alleviated by resting and sitting down since noon. States CP has been getting progressively worse around midnight which became persistent, patient took 2 baby ASA, and went to the ED when unable to sleep 2/2 pain. States the pain was 9/10, described as squeezing substernal CP.  Denies any shortness of breath fever chills upper respiratory symptoms. In ED found with STEMI with KASEY V1,V2,V3 with +CE. ASA and Brilinta loaded, started on heparin gtt and transferred to [facility] for LHC.    In Cath lab found with severe triple vessel disease (OM1, pLAD, and Cx disease) with vgram 20-25%. IABP placed. States now CP improved, mild chest pressure 1/10.     Of note patient states has not seen physician in a few years. Recently stopped his simvastatin 1 week ago himself in which prior he was intermittently taking 20mg.  (23 Apr 2023 05:40)        s/p cardiac cath on 4/24/23 with stent to mid and prox LAD  IABP in place.  Groin:  right and left groin w/o bleeding or hematoma,  soft, non  tender.  Pulses in the right and left  lower extremity are palpable  Denies chest pain, denies groin/leg/foot: pain, numbness or tingling     - Reviewed and reinforced with patient:  wound care instructions, activities dos and donts, medication compliance specifically antiplatelet therapy given stent/s.    - Patient aware to take DAPT  as prescribed and DO NOT STOP taking without consulting cardiologist first or STENT/s WILL CLOSE  - Reviewed and reinforced with patient:  site complications ( eg: bleeding, excruciating pain at the procedural site, large sweliing-golf ball size-  extremity numbness, tingling, temperature change), or CHEST PAIN; pt aware that if any of those occur he/she must call cardiologist IMMEDIATELY or 911 or go to nearest emergency room   - Reviewed and reinforced a heart healthy diet, Smoking Cessation  - Patient verbalizes understanding of ALL OF THE ABOVE, and gives positive feedback       cont DAPT   cont antihypertesives  cont statin   please make sure DAPT is prescibed to pt's preferred pharmacy on dc   *triple therapy? ( if so for how long.. indicate)  * heparin gtt ?   Keep Mg >2 K >4  f/u appt in 2 weeks post dc with oupt cardiologist  for all  general cardilogy questions please contact patient's primary cards team   all other care as per primary medicine  team     TAMANNA Martinez,  Invasive Cardiology

## 2023-04-25 NOTE — PROGRESS NOTE ADULT - PROBLEM SELECTOR PLAN 2
Found to have EF of 33% on TTE after initial LHC on 4/23/23 AM. Subsequently had another LHC on 4/23/23 PM for high risk PCI with DESx2 placed in LAD. Acute HFrEF likely in setting of ICM from TVD c/b STEMI.  - Per interventional cardiology, repeat TTE  - TSH wnl; lipids elevated TTE (4/24/23, after initial LHC on 4/23/23 AM) showed EF 33% w/o LV thrombus, multiple segmental abnormalities/WMAs, trace MR. Subsequently had another LHC on 4/23/23 PM for high risk PCI with DESx2 placed in LAD. Acute HFrEF likely in setting of ICM from TVD c/b STEMI.  - TSH wnl; lipids elevated  - f/u repeat TTE per interventional cardiology TTE (4/24/23, after initial LHC on 4/23/23 AM) showed EF 33% w/o LV thrombus, multiple segmental abnormalities/WMAs, trace MR. Subsequently had another LHC on 4/23/23 PM for high risk PCI with DESx2 placed in LAD. Acute HFrEF likely in setting of ICM from TVD c/b STEMI.  - TSH wnl; lipids elevated  - no overt signs of volume overload at this time  - f/u repeat TTE per interventional cardiology

## 2023-04-25 NOTE — PROGRESS NOTE ADULT - PROBLEM SELECTOR PLAN 3
Found to have WBC of 11.85k on admission, uptrending to 15.45k on downgrade from CICU. Initially believed to be reactive 2/2 STEMI. No documented fevers, however has had multiple low grade temps up to 99.9F oral in CICU.  - continue to monitor WBC and for fevers  - check rectal temp  - will send BCx Found to have WBC of 11.85k on admission, uptrending to 15.45k on downgrade from CICU. Initially believed to be reactive 2/2 STEMI. No documented fevers, however has had multiple low grade temps up to 99.9F oral in CICU. Has cough, but pt attributes to smoking. Also has L shoulder pain (but states it is a chronic issue that is intermittent). No other obvious s/s of infection. Possibly reactive to STEMI/TVD vs occult infection vs chronic smoking  - continue to monitor WBC and for fevers  - check rectal temp  - if febrile or leukocytosis persists, consider sending BCx to assess for occult infection  - check CXR given cough and L shoulder XR given L shoulder pain Found to have WBC of 11.85k on admission, uptrending to 15.45k on downgrade from CICU. Initially believed to be reactive 2/2 STEMI. No documented fevers, however has had multiple low grade temps up to 99.9F oral in CICU. Has cough, but pt attributes to smoking. Also has L shoulder pain (but states it is a chronic issue that is intermittent). No other obvious s/s of infection. Possibly reactive to STEMI/TVD vs chronic smoking vs occult infection  - continue to monitor WBC and for fevers  - check rectal temp  - if febrile or leukocytosis persists, consider sending BCx to assess for occult infection  - f/u CXR given cough and L shoulder XR given L shoulder pain  - COVID-19 recently neg, holding off on sending full RVP as pt denies URI symptoms at this time (aside from chronic cough)

## 2023-04-25 NOTE — PROGRESS NOTE ADULT - PROBLEM SELECTOR PLAN 6
- DVT ppx: HSQ  - Diet: DASH/CC  - Dispo: pending repeat TTE, further Cardiology recs Pt was a current every day smoker prior to admission.  - declined nicotine patch  - counseled on smoking cessation, pt seemed motivated to stop Pt was a current every day smoker prior to admission. Reported smoking for the past 25 yrs, typically 1 pack over 2-4 days.  - Discussed risks/benefits and counseled on smoking cessation, pt seemed ready and motivated on quitting smoking, informed him that resources are available to help him quit smoking if he is interested, but he would like to try by himself for now  - declining nicotine patch at this time

## 2023-04-25 NOTE — PROGRESS NOTE ADULT - PROBLEM SELECTOR PLAN 5
Pt was a current every day smoker prior to admission.  - declined nicotine patch  - continue to encourage smoking cessation Hx of DM2. At home, reportedly took Metformin 500mg intermittently when eating poorly. Previously took it every day and stated his HbA1c was ~6%.  - Last HbA1c (4/23/23): 7.1%  - c/w mod ISS for now  - monitor FS qAC and qHS  - can consider discharging on Metformin and following up with PCP/Endocrinology

## 2023-04-25 NOTE — PROGRESS NOTE ADULT - SUBJECTIVE AND OBJECTIVE BOX
[facility] INVASIVE CARDIOLOGY- (Jarad, Francia, Danielle, Teetee, Gurpreet, Abhijit, Kylee, Aniceto, Maciel, Jerson)   CARDIAC CATH LAB, ACP TEAM   [phone]      CHIEF COMPLAINT: Patient is a 62y old  Male who presents with a chief complaint of STEMI, found with TVD (25 Apr 2023 07:37)      HPI:  62M PMH of CAD with 1 stent in 2013 to LAD and DM2 p/w intermittent chest pain which was alleviated by resting and sitting down since noon. States CP has been getting progressively worse around midnight which became persistent, patient took 2 baby ASA, and went to the ED when unable to sleep 2/2 pain. States the pain was 9/10, described as squeezing substernal CP.  Denies any shortness of breath fever chills upper respiratory symptoms. In ED found with STEMI with KASEY V1,V2,V3 with +CE. ASA and Brilinta loaded, started on heparin gtt and transferred to [facility] for LHC.    In Cath lab found with severe triple vessel disease (OM1, pLAD, and Cx disease) with vgram 20-25%. IABP placed. States now CP improved, mild chest pressure 1/10.     Of note patient states has not seen physician in a few years. Recently stopped his simvastatin 1 week ago himself in which prior he was intermittently taking 20mg.  (23 Apr 2023 05:40)        Subjective/Observations: patient seen and examined.  denies chest pain, dyspena, dizziness, palpitations, N&V, HA      Review of Systems all WNL except below indicated:    Constitutional: [ ] Fever [ ] Chills [ ] Fatigue [ ] Weight change   HEENT: [ ] Blurred vision [ ] Eye Pain [ ] Headache [ ] Runny nose [ ] Sore Throat   Respiratory: [ ] Cough [ ] Wheezing [ ] Shortness of breath  Cardiovascular: [ ] Chest Pain [ ] Palpitations [ ] MCCORMACK [ ] PND [ ] Orthopnea  Gastrointestinal: [ ] Abdominal Pain [ ] Diarrhea [ ] Constipation [ ] Hemorrhoids [ ] Nausea [ ] Vomiting  Genitourinary: [ ] Nocturia [ ] Dysuria [ ] Incontinence  Extremities: [ ] Swelling [ ] Joint Pain. Right groin with IABP, left groin cath access site without bleeding/ hematoma  Neurologic: [ ] Focal deficit [ ] Paresthesias [ ] Syncope  Lymphatic: [ ] Swelling [ ] Lymphadenopathy   Skin: [ ] Rash [ ] Ecchymoses [ ] Wounds [ ] Lesions  Psychiatry: [ ] Depression [ ] Suicidal/Homicidal Ideation [ ] Anxiety [ ] Sleep Disturbances  [ ] 10 point review of systems is otherwise negative except as mentioned above            [ ]Unable to obtain    PAST MEDICAL & SURGICAL HISTORY:  CAD (coronary artery disease)      Diabetes      No significant past surgical history      MEDICATIONS  (STANDING):  aspirin enteric coated 81 milliGRAM(s) Oral daily  atorvastatin 80 milliGRAM(s) Oral at bedtime  captopril 6.25 milliGRAM(s) Oral every 8 hours  chlorhexidine 4% Liquid 1 Application(s) Topical <User Schedule>  coronavirus bivalent (EUA) Booster Vaccine (PFIZER) 0.3 milliLiter(s) IntraMuscular once  insulin lispro (ADMELOG) corrective regimen sliding scale   SubCutaneous at bedtime  insulin lispro (ADMELOG) corrective regimen sliding scale   SubCutaneous three times a day before meals  metoprolol tartrate 12.5 milliGRAM(s) Oral two times a day  sodium chloride 0.9% lock flush 3 milliLiter(s) IV Push every 8 hours  ticagrelor 90 milliGRAM(s) Oral every 12 hours    MEDICATIONS  (PRN):  aluminum hydroxide/magnesium hydroxide/simethicone Suspension 30 milliLiter(s) Oral once PRN Dyspepsia    Allergies    No Known Allergies    Intolerances  Vital Signs Last 24 Hrs  T(C): 36.9 (25 Apr 2023 07:45), Max: 37.7 (24 Apr 2023 16:00)  T(F): 98.4 (25 Apr 2023 07:45), Max: 99.9 (24 Apr 2023 16:00)  HR: 76 (25 Apr 2023 11:00) (67 - 79)  BP: 112/71 (25 Apr 2023 07:45) (112/71 - 112/71)  BP(mean): 87 (25 Apr 2023 07:45) (87 - 87)  RR: 30 (25 Apr 2023 11:00) (17 - 34)  SpO2: 94% (25 Apr 2023 11:00) (94% - 97%)    Parameters below as of 25 Apr 2023 10:00  Patient On (Oxygen Delivery Method): room air    I&O's Summary    24 Apr 2023 07:01  -  25 Apr 2023 07:00  --------------------------------------------------------  IN: 758 mL / OUT: 3200 mL / NET: -2442 mL    25 Apr 2023 07:01  -  25 Apr 2023 12:52  --------------------------------------------------------  IN: 350 mL / OUT: 500 mL / NET: -150 mL      FOCUSED PHYSICAL EXAM:  Pulmonary: Non-labored, breath sounds are clear bilaterally, No wheezing, rales or rhonchi  Cardiovascular: Regular, S1 and S2, No murmurs, rubs, gallops or clicks  cath site: ( groin/radial)  stable w/o bleeding or hematoma, soft, + pulses     LABS: All Labs Reviewed:                        11.7   15.45 )-----------( 173      ( 25 Apr 2023 03:06 )             34.8                         12.0   14.70 )-----------( 207      ( 24 Apr 2023 12:28 )             35.7                         12.6   13.01 )-----------( 227      ( 24 Apr 2023 00:53 )             36.7     25 Apr 2023 03:06    134    |  103    |  15     ----------------------------<  152    4.0     |  18     |  0.95   24 Apr 2023 00:53    134    |  102    |  11     ----------------------------<  161    3.6     |  19     |  0.91   23 Apr 2023 09:13    137    |  105    |  12     ----------------------------<  143    3.9     |  20     |  0.77     Ca    8.5        25 Apr 2023 03:06  Ca    9.0        24 Apr 2023 00:53  Ca    9.2        23 Apr 2023 09:13  Phos  2.6       25 Apr 2023 03:06  Phos  2.9       24 Apr 2023 00:53  Phos  3.4       23 Apr 2023 09:13  Mg     2.0       25 Apr 2023 03:06  Mg     1.7       24 Apr 2023 00:53  Mg     2.0       23 Apr 2023 09:13    TPro  6.4    /  Alb  3.5    /  TBili  0.9    /  DBili  x      /  AST  112    /  ALT  21     /  AlkPhos  75     25 Apr 2023 03:06  TPro  6.5    /  Alb  3.8    /  TBili  0.5    /  DBili  x      /  AST  214    /  ALT  21     /  AlkPhos  74     24 Apr 2023 00:53  TPro  7.0    /  Alb  4.2    /  TBili  0.3    /  DBili  x      /  AST  112    /  ALT  12     /  AlkPhos  81     23 Apr 2023 09:13    PT/INR - ( 25 Apr 2023 03:06 )   PT: 13.7 sec;   INR: 1.19 ratio         PTT - ( 25 Apr 2023 09:12 )  PTT:30.6 sec  CARDIAC MARKERS ( 25 Apr 2023 03:06 )  x     / x     / 1127 U/L / x     / 14.4 ng/mL  CARDIAC MARKERS ( 24 Apr 2023 00:53 )  x     / x     / 2629 U/L / x     / 120.5 ng/mL  CARDIAC MARKERS ( 23 Apr 2023 21:39 )  x     / x     / 2544 U/L / x     / 151.1 ng/mL          RESULTS:  TELE intepretation:   ECG:  ECHO: < from: TTE W or WO Ultrasound Enhancing Agent (04.23.23 @ 08:35) >  CONCLUSIONS:      1. The left ventricular systolic function is severely decreased with an ejection fraction of 33 % by Severino's methodof disks. No evidence of a thrombus in the left ventricle.   2. Multiple segmental abnormalities exist. See findings.   3. Normal right ventricular cavity size, normal wall thickness and normal systolic function.   4. Trace mitral regurgitation.   5.No evidence of aortic regurgitation.   6. No prior echocardiogram is available for comparison.    < end of copied text >    CATH REPORT:  < from: Cardiac Catheterization (04.23.23 @ 18:44) >  Procedures Performed   Procedures:               1.    Arterial Access - Left Femoral     2.    Ultrasound Guided Access   3.    PCI: YADIRA   4.    PCI: YADIRA   5.    IVUS     Indications:                Acute Coronary Syndrome   Myocardial infarction without ST elevation (NSTEMI)     Lab Visit Indication:      Acute Coronary Syndrome, NSTEMI   PCI Status:                emergent     Conclusions:     Status post angioplasty, IVUS and stenting (drug-eluting/[employer]  [place]) of the left anterior descending artery with resultant    < end of copied text >       < from: Cardiac Catheterization (04.23.23 @ 05:01) >  Conclusions:     Severe two vessel obstructive coronary artery disease   --Left anterior descending artery and left circumflex artery   Normal left main coronary artery   Right dominant system   No gradient across the aortic valve   EF LVEDP = 28mmHg     Status post placement of an IABP through the right common femoral  artery    < end of copied text >

## 2023-04-25 NOTE — PROGRESS NOTE ADULT - ASSESSMENT
====================ASSESSMENT ==============  62M PMH CAD (YADIRA to LAD '13) and DM p/w CP found with STEMI s.p LHC with TVD and IABP placement.     ====================== NEUROLOGY=====================  A&Ox4  - continue to monitor mental status as per protocol     ==================== RESPIRATORY======================  Comfortable on RA  - continue to monitor SpO2 with goal >94%    ====================CARDIOVASCULAR==================  TVD  - s/p [facility] 4/23 with severe CAD to pLAD, CX, OM1. IABP placed for coronary perfusion. Vgram 20-25%  - CTS eval (Dr. Marques) - family/patient refusing CTS, would like to proceed with high risk PCI  - s/p [facility] high risk PCI: YADIRA x 2 prox & mid LAD, integrillin started   - TTE ordered  -Troponin trending down, lactate was wnl  -No plan for staged PCI, will start GDMT: add Captopril 6.25 mg  - c/w DAPT (ASA/Brilinta) s/p Brilinta load, Atorvastatin 80 mg   - Maintain IABP 1:1 augmentation. Monitor site and peripheral pulses per protocol. Daily CXR while in place. Plan to remove pump on 4/25  -Heparin gtt to be stopped at 5AM on 4/25      Smoker  - smoking cessesation education ordered  - declining nicotine patch at this mayra    ===================HEMATOLOGIC/ONC ===================  h/h and plts wnl  - Monitor H/H and plts  - DVT PPX: heparin gtt for IABP, & DVT PPX    ===================== RENAL =========================  SCr 1.1  - Continue monitoring urine output, lytes, SCr/ BUN  - replete lytes prn with goal K >4 and Mg >2    ==================== GASTROINTESTINAL===================  DASH carb consistent diet as frida  - monitor for regular BM while inpatient    =======================    ENDOCRINE  =====================  DM2  - states intermittently takes 500mg of metformin when eating poorly.   - Start mISS. Monitor FS with goal 100-180. Consider addition of basal/bolus insulin if above goal  - f/u hgb a1c    -f/u TSH and lipid panel   ========================INFECTIOUS DISEASE================  Afebrile, mild leukocytosis   - Leukocytosis likely reactive 2/2 STEMI. No s/s of infection   - monitor and trend WBC and temperature curve    ====================ASSESSMENT ==============  62M PMH CAD (YADIRA to LAD '13) and DM p/w CP found with STEMI s.p LHC with TVD and IABP placement.     ====================== NEUROLOGY=====================  A&Ox4  - continue to monitor mental status as per protocol     ==================== RESPIRATORY======================  Comfortable on RA  - continue to monitor SpO2 with goal >94%    ====================CARDIOVASCULAR==================  TVD  - s/p [facility] 4/23 with severe CAD to pLAD, CX, OM1. IABP placed for coronary perfusion. Vgram 20-25%  - CTS eval (Dr. Marques) - family/patient refusing CTS, would like to proceed with high risk PCI  - s/p [facility] high risk PCI: YADIRA x 2 prox & mid LAD, integrillin started   - TTE ordered  -Troponin trending down, lactate was wnl  -No plan for staged PCI, started GDMT: add Captopril 6.25 mg. Will remove balloon today, and ambulate. Consider stopping Captopril and starting Losartan.   - c/w DAPT (ASA/Brilinta) s/p Brilinta load, Atorvastatin 80 mg   -pump removed 4/25         Smoker  - smoking cessesation education ordered  - declining nicotine patch at this mayra    ===================HEMATOLOGIC/ONC ===================  h/h and plts wnl  - Monitor H/H and plts  - DVT PPX: Heparin SQ DVT PPX    ===================== RENAL =========================  SCr 1.1  - Continue monitoring urine output, lytes, SCr/ BUN  - replete lytes prn with goal K >4 and Mg >2    ==================== GASTROINTESTINAL===================  DASH carb consistent diet as frida  - monitor for regular BM while inpatient    =======================    ENDOCRINE  =====================  DM2  - states intermittently takes 500mg of metformin when eating poorly.   - Start mISS. Monitor FS with goal 100-180. Consider addition of basal/bolus insulin if above goal  - f/u hgb a1c    -f/u TSH and lipid panel   ========================INFECTIOUS DISEASE================  Afebrile, mild leukocytosis   - Leukocytosis likely reactive 2/2 STEMI. No s/s of infection   - monitor and trend WBC and temperature curve  09-Mar-2019 17:09

## 2023-04-25 NOTE — PROGRESS NOTE ADULT - TIME BILLING
education, assessment and coordination of care.
reviewing prior documentation, independently obtaining a history and interpreting results of tests, performing a physical examination, reviewing tests/imaging, discussing the plan with the patient/family, counseling and educating the patient/family member(s), ordering medications/tests, documenting clinical information in the electronic health record, and coordinating care with staff.    Additional 8 minutes was spent on counseling for tobacco cessation and lifestyle modification

## 2023-04-25 NOTE — PROGRESS NOTE ADULT - SUBJECTIVE AND OBJECTIVE BOX
RENAY FLORIAN  MRN-[id number]  Patient is a 62y old  Male who presents with a chief complaint of STEMI, found with TVD (23 Apr 2023 06:22)    HPI: 62M PMH of CAD with 1 stent in 2013 to LAD and DM2 p/w intermittent chest pain which was alleviated by resting and sitting down since noon. States CP has been getting progressively worse around midnight which became persistent, patient took 2 baby ASA, and went to the ED when unable to sleep 2/2 pain. States the pain was 9/10, described as squeezing substernal CP.  Denies any shortness of breath fever chills upper respiratory symptoms. In ED found with STEMI with KASEY V1,V2,V3 with +CE. ASA and Brilinta loaded, started on heparin gtt and transferred to [facility] for LHC.    In Cath lab found with severe triple vessel disease (OM1, pLAD, and Cx disease) with vgram 20-25%. IABP placed. States now CP improved, mild chest pressure 1/10.     Of note patient states has not seen physician in a few years. Recently stopped his simvastatin 1 week ago himself in which prior he was intermittently taking 20mg.  (23 Apr 2023 05:40)    24hr events:  Patient has been eating without issues. He endorse persistent intermittent dry cough. Denied chest pain, SOB, palpitation, dizziness.       REVIEW OF SYSTEMS:  CONSTITUTIONAL: No weakness, fevers or chills  EYES/ENT: No visual changes;  No vertigo or throat pain   NECK: No pain or stiffness  RESPIRATORY: No cough, wheezing, hemoptysis; No shortness of breath  CARDIOVASCULAR: No chest pain or palpitations  GASTROINTESTINAL: No abdominal or epigastric pain  No nausea, vomiting, or hematemesis; No diarrhea or constipation. No melena or hematochezia.  GENITOURINARY: No dysuria, frequency or hematuria  NEUROLOGICAL: No numbness or weakness  SKIN: No itching, rashes    ICU Vital Signs Last 24 Hrs  T(C): 37.2 (24 Apr 2023 03:00), Max: 37.2 (24 Apr 2023 03:00)  T(F): 99 (24 Apr 2023 03:00), Max: 99 (24 Apr 2023 03:00)  HR: 61 (24 Apr 2023 07:00) (61 - 80)  BP: 122/74 (24 Apr 2023 07:00) (93/66 - 132/69)  BP(mean): 93 (24 Apr 2023 07:00) (76 - 93)  ABP: --  ABP(mean): --  RR: 25 (24 Apr 2023 07:00) (18 - 36)  SpO2: 94% (24 Apr 2023 07:00) (93% - 98%)    O2 Parameters below as of 24 Apr 2023 07:00  Patient On (Oxygen Delivery Method): room air            O2 Parameters below as of 23 Apr 2023 16:00  Patient On (Oxygen Delivery Method): room air          CAPILLARY BLOOD GLUCOSE  POCT Blood Glucose.: 140 mg/dL (23 Apr 2023 13:42)    PHYSICAL EXAM:  GENERAL: No acute distress, well-developed  HEAD:  Atraumatic, Normocephalic  EYES: EOMI, PERRLA, conjunctiva and sclera clear  NECK: Supple, no lymphadenopathy, no JVD  CHEST/LUNG: CTAB; No wheezes, rales, or rhonchi  HEART: Regular rate and rhythm. Normal S1/S2. No murmurs, rubs, or gallops  ABDOMEN: Soft, non-tender, non-distended; normal bowel sounds, no organomegaly  EXTREMITIES:  2+ peripheral pulses b/l, No clubbing, cyanosis, or edema  NEUROLOGY: A&O x 3, no focal deficits  SKIN: Femoral site was CDI, no pain on palpation    ============================I/O===========================   I&O's Detail    23 Apr 2023 07:01  -  23 Apr 2023 19:59  --------------------------------------------------------  IN:    Heparin: 67.5 mL    Nitroglycerin: 93 mL    Oral Fluid: 240 mL  Total IN: 400.5 mL    OUT:    Voided (mL): 2150 mL  Total OUT: 2150 mL    Total NET: -1749.5 mL  ============================ LABS =========================                     13.8   11.85 )-----------( 293      ( 23 Apr 2023 03:40 )             41.3     04-23    137  |  105  |  12  ----------------------------<  143<H>  3.9   |  20<L>  |  0.77    Ca    9.2      23 Apr 2023 09:13  Phos  3.4     04-23  Mg     2.0     04-23    TPro  7.0  /  Alb  4.2  /  TBili  0.3  /  DBili  x   /  AST  112<H>  /  ALT  12  /  AlkPhos  81  04-23    Troponin T, High Sensitivity Result: 1587 ng/L (04-23-23 @ 12:04)  Troponin T, High Sensitivity Result: 1061 ng/L (04-23-23 @ 09:13)    CKMB Units: 189.7 ng/mL (04-23-23 @ 12:04)  CKMB Units: 145.3 ng/mL (04-23-23 @ 09:13)    Creatine Kinase, Serum: 2103 U/L (04-23-23 @ 12:04)  Creatine Kinase, Serum: 1498 U/L (04-23-23 @ 09:13)    CPK Mass Assay %: 9.0 % (04-23-23 @ 12:04)  CPK Mass Assay %: 9.7 % (04-23-23 @ 09:13)    LIVER FUNCTIONS - ( 23 Apr 2023 09:13 )  Alb: 4.2 g/dL / Pro: 7.0 g/dL / ALK PHOS: 81 U/L / ALT: 12 U/L / AST: 112 U/L / GGT: x           PT/INR - ( 23 Apr 2023 03:40 )   PT: 11.3 sec;   INR: 0.97 ratio    PTT - ( 23 Apr 2023 03:40 )  PTT:34.4 sec  ======================Micro/Rad/Cardio=================  Telemtry: Reviewed   EKG: Reviewed  CXR: Reviewed  Echo: Reviewed   Cath: Reviewed   ======================================================  PAST MEDICAL & SURGICAL HISTORY:  CAD (coronary artery disease)    Diabetes    No significant past surgical history       RENAY FLORIAN  MRN-[id number]  Patient is a 62y old  Male who presents with a chief complaint of STEMI, found with TVD (23 Apr 2023 06:22)    HPI: 62M PMH of CAD with 1 stent in 2013 to LAD and DM2 p/w intermittent chest pain which was alleviated by resting and sitting down since noon. States CP has been getting progressively worse around midnight which became persistent, patient took 2 baby ASA, and went to the ED when unable to sleep 2/2 pain. States the pain was 9/10, described as squeezing substernal CP.  Denies any shortness of breath fever chills upper respiratory symptoms. In ED found with STEMI with KASEY V1,V2,V3 with +CE. ASA and Brilinta loaded, started on heparin gtt and transferred to [facility] for LHC.    In Cath lab found with severe triple vessel disease (OM1, pLAD, and Cx disease) with vgram 20-25%. IABP placed. States now CP improved, mild chest pressure 1/10.     Of note patient states has not seen physician in a few years. Recently stopped his simvastatin 1 week ago himself in which prior he was intermittently taking 20mg.  (23 Apr 2023 05:40)    24hr events:  Heparin gtt was discontinued. Balloon pump was weaned. Patient has been eating without issues. Dry cough resolved. Chronic L neck pain, improved with medications. Denied chest pain, SOB, palpitation, dizziness.       REVIEW OF SYSTEMS:  CONSTITUTIONAL: No weakness, fevers or chills  EYES/ENT: No visual changes;  No vertigo or throat pain   NECK: No pain or stiffness  RESPIRATORY: No cough, wheezing, hemoptysis; No shortness of breath  CARDIOVASCULAR: No chest pain or palpitations  GASTROINTESTINAL: No abdominal or epigastric pain  No nausea, vomiting, or hematemesis; No diarrhea or constipation. No melena or hematochezia.  GENITOURINARY: No dysuria, frequency or hematuria  NEUROLOGICAL: No numbness or weakness  SKIN: No itching, rashes    ICU Vital Signs Last 24 Hrs  T(C): 37.2 (24 Apr 2023 03:00), Max: 37.2 (24 Apr 2023 03:00)  T(F): 99 (24 Apr 2023 03:00), Max: 99 (24 Apr 2023 03:00)  HR: 61 (24 Apr 2023 07:00) (61 - 80)  BP: 122/74 (24 Apr 2023 07:00) (93/66 - 132/69)  BP(mean): 93 (24 Apr 2023 07:00) (76 - 93)  ABP: --  ABP(mean): --  RR: 25 (24 Apr 2023 07:00) (18 - 36)  SpO2: 94% (24 Apr 2023 07:00) (93% - 98%)    O2 Parameters below as of 24 Apr 2023 07:00  Patient On (Oxygen Delivery Method): room air            O2 Parameters below as of 23 Apr 2023 16:00  Patient On (Oxygen Delivery Method): room air          CAPILLARY BLOOD GLUCOSE  POCT Blood Glucose.: 140 mg/dL (23 Apr 2023 13:42)    PHYSICAL EXAM:  GENERAL: No acute distress, well-developed  HEAD:  Atraumatic, Normocephalic  EYES: EOMI, PERRLA, conjunctiva and sclera clear  NECK: Supple, no lymphadenopathy, no JVD  CHEST/LUNG: CTAB; No wheezes, rales, or rhonchi  HEART: Regular rate and rhythm. Normal S1/S2. No murmurs, rubs, or gallops  ABDOMEN: Soft, non-tender, non-distended; normal bowel sounds, no organomegaly  EXTREMITIES:  2+ peripheral pulses b/l, No clubbing, cyanosis, or edema  NEUROLOGY: A&O x 3, no focal deficits  SKIN: Femoral site was CDI, no pain on palpation    ============================I/O===========================   I&O's Detail    23 Apr 2023 07:01  -  23 Apr 2023 19:59  --------------------------------------------------------  IN:    Heparin: 67.5 mL    Nitroglycerin: 93 mL    Oral Fluid: 240 mL  Total IN: 400.5 mL    OUT:    Voided (mL): 2150 mL  Total OUT: 2150 mL    Total NET: -1749.5 mL  ============================ LABS =========================                     13.8   11.85 )-----------( 293      ( 23 Apr 2023 03:40 )             41.3     04-23    137  |  105  |  12  ----------------------------<  143<H>  3.9   |  20<L>  |  0.77    Ca    9.2      23 Apr 2023 09:13  Phos  3.4     04-23  Mg     2.0     04-23    TPro  7.0  /  Alb  4.2  /  TBili  0.3  /  DBili  x   /  AST  112<H>  /  ALT  12  /  AlkPhos  81  04-23    Troponin T, High Sensitivity Result: 1587 ng/L (04-23-23 @ 12:04)  Troponin T, High Sensitivity Result: 1061 ng/L (04-23-23 @ 09:13)    CKMB Units: 189.7 ng/mL (04-23-23 @ 12:04)  CKMB Units: 145.3 ng/mL (04-23-23 @ 09:13)    Creatine Kinase, Serum: 2103 U/L (04-23-23 @ 12:04)  Creatine Kinase, Serum: 1498 U/L (04-23-23 @ 09:13)    CPK Mass Assay %: 9.0 % (04-23-23 @ 12:04)  CPK Mass Assay %: 9.7 % (04-23-23 @ 09:13)    LIVER FUNCTIONS - ( 23 Apr 2023 09:13 )  Alb: 4.2 g/dL / Pro: 7.0 g/dL / ALK PHOS: 81 U/L / ALT: 12 U/L / AST: 112 U/L / GGT: x           PT/INR - ( 23 Apr 2023 03:40 )   PT: 11.3 sec;   INR: 0.97 ratio    PTT - ( 23 Apr 2023 03:40 )  PTT:34.4 sec  ======================Micro/Rad/Cardio=================  Telemtry: Reviewed   EKG: Reviewed  CXR: Reviewed  Echo: Reviewed   Cath: Reviewed   ======================================================  PAST MEDICAL & SURGICAL HISTORY:  CAD (coronary artery disease)    Diabetes    No significant past surgical history

## 2023-04-26 LAB
ALBUMIN SERPL ELPH-MCNC: 3.7 G/DL — SIGNIFICANT CHANGE UP (ref 3.3–5)
ALP SERPL-CCNC: 87 U/L — SIGNIFICANT CHANGE UP (ref 40–120)
ALT FLD-CCNC: 16 U/L — SIGNIFICANT CHANGE UP (ref 10–45)
ANION GAP SERPL CALC-SCNC: 13 MMOL/L — SIGNIFICANT CHANGE UP (ref 5–17)
AST SERPL-CCNC: 48 U/L — HIGH (ref 10–40)
BILIRUB SERPL-MCNC: 1 MG/DL — SIGNIFICANT CHANGE UP (ref 0.2–1.2)
BUN SERPL-MCNC: 13 MG/DL — SIGNIFICANT CHANGE UP (ref 7–23)
CALCIUM SERPL-MCNC: 8.9 MG/DL — SIGNIFICANT CHANGE UP (ref 8.4–10.5)
CHLORIDE SERPL-SCNC: 103 MMOL/L — SIGNIFICANT CHANGE UP (ref 96–108)
CK MB BLD-MCNC: 1.1 % — SIGNIFICANT CHANGE UP (ref 0–3.5)
CK MB CFR SERPL CALC: 3.3 NG/ML — SIGNIFICANT CHANGE UP (ref 0–6.7)
CK SERPL-CCNC: 302 U/L — HIGH (ref 30–200)
CO2 SERPL-SCNC: 20 MMOL/L — LOW (ref 22–31)
CREAT SERPL-MCNC: 0.87 MG/DL — SIGNIFICANT CHANGE UP (ref 0.5–1.3)
EGFR: 99 ML/MIN/1.73M2 — SIGNIFICANT CHANGE UP
GLUCOSE BLDC GLUCOMTR-MCNC: 127 MG/DL — HIGH (ref 70–99)
GLUCOSE BLDC GLUCOMTR-MCNC: 147 MG/DL — HIGH (ref 70–99)
GLUCOSE BLDC GLUCOMTR-MCNC: 156 MG/DL — HIGH (ref 70–99)
GLUCOSE BLDC GLUCOMTR-MCNC: 190 MG/DL — HIGH (ref 70–99)
GLUCOSE SERPL-MCNC: 119 MG/DL — HIGH (ref 70–99)
HCT VFR BLD CALC: 35.5 % — LOW (ref 39–50)
HGB BLD-MCNC: 11.8 G/DL — LOW (ref 13–17)
MAGNESIUM SERPL-MCNC: 2.1 MG/DL — SIGNIFICANT CHANGE UP (ref 1.6–2.6)
MCHC RBC-ENTMCNC: 29.8 PG — SIGNIFICANT CHANGE UP (ref 27–34)
MCHC RBC-ENTMCNC: 33.2 GM/DL — SIGNIFICANT CHANGE UP (ref 32–36)
MCV RBC AUTO: 89.6 FL — SIGNIFICANT CHANGE UP (ref 80–100)
NRBC # BLD: 0 /100 WBCS — SIGNIFICANT CHANGE UP (ref 0–0)
PHOSPHATE SERPL-MCNC: 2.4 MG/DL — LOW (ref 2.5–4.5)
PLATELET # BLD AUTO: 195 K/UL — SIGNIFICANT CHANGE UP (ref 150–400)
POTASSIUM SERPL-MCNC: 3.9 MMOL/L — SIGNIFICANT CHANGE UP (ref 3.5–5.3)
POTASSIUM SERPL-SCNC: 3.9 MMOL/L — SIGNIFICANT CHANGE UP (ref 3.5–5.3)
PROT SERPL-MCNC: 6.6 G/DL — SIGNIFICANT CHANGE UP (ref 6–8.3)
RBC # BLD: 3.96 M/UL — LOW (ref 4.2–5.8)
RBC # FLD: 13.2 % — SIGNIFICANT CHANGE UP (ref 10.3–14.5)
SODIUM SERPL-SCNC: 136 MMOL/L — SIGNIFICANT CHANGE UP (ref 135–145)
TROPONIN T, HIGH SENSITIVITY RESULT: 3207 NG/L — HIGH (ref 0–51)
TROPONIN T, HIGH SENSITIVITY RESULT: 3446 NG/L — HIGH (ref 0–51)
WBC # BLD: 12.48 K/UL — HIGH (ref 3.8–10.5)
WBC # FLD AUTO: 12.48 K/UL — HIGH (ref 3.8–10.5)

## 2023-04-26 PROCEDURE — 73030 X-RAY EXAM OF SHOULDER: CPT | Mod: 26,LT

## 2023-04-26 PROCEDURE — 99232 SBSQ HOSP IP/OBS MODERATE 35: CPT

## 2023-04-26 PROCEDURE — 99233 SBSQ HOSP IP/OBS HIGH 50: CPT

## 2023-04-26 PROCEDURE — 93010 ELECTROCARDIOGRAM REPORT: CPT | Mod: 76

## 2023-04-26 RX ORDER — ASPIRIN/CALCIUM CARB/MAGNESIUM 324 MG
325 TABLET ORAL THREE TIMES A DAY
Refills: 0 | Status: DISCONTINUED | OUTPATIENT
Start: 2023-04-26 | End: 2023-04-27

## 2023-04-26 RX ORDER — ACETAMINOPHEN 500 MG
650 TABLET ORAL EVERY 6 HOURS
Refills: 0 | Status: COMPLETED | OUTPATIENT
Start: 2023-04-26 | End: 2023-04-27

## 2023-04-26 RX ORDER — ACETAMINOPHEN 500 MG
1000 TABLET ORAL ONCE
Refills: 0 | Status: COMPLETED | OUTPATIENT
Start: 2023-04-26 | End: 2023-04-26

## 2023-04-26 RX ORDER — CAPTOPRIL 12.5 MG/1
6.25 TABLET ORAL DAILY
Refills: 0 | Status: DISCONTINUED | OUTPATIENT
Start: 2023-04-27 | End: 2023-04-28

## 2023-04-26 RX ORDER — CYCLOBENZAPRINE HYDROCHLORIDE 10 MG/1
10 TABLET, FILM COATED ORAL ONCE
Refills: 0 | Status: COMPLETED | OUTPATIENT
Start: 2023-04-26 | End: 2023-04-26

## 2023-04-26 RX ORDER — ACETAMINOPHEN 500 MG
650 TABLET ORAL ONCE
Refills: 0 | Status: COMPLETED | OUTPATIENT
Start: 2023-04-26 | End: 2023-04-26

## 2023-04-26 RX ADMIN — HEPARIN SODIUM 5000 UNIT(S): 5000 INJECTION INTRAVENOUS; SUBCUTANEOUS at 22:32

## 2023-04-26 RX ADMIN — Medication 81 MILLIGRAM(S): at 11:56

## 2023-04-26 RX ADMIN — Medication 2: at 09:02

## 2023-04-26 RX ADMIN — Medication 325 MILLIGRAM(S): at 18:32

## 2023-04-26 RX ADMIN — Medication 400 MILLIGRAM(S): at 00:28

## 2023-04-26 RX ADMIN — Medication 2: at 12:43

## 2023-04-26 RX ADMIN — Medication 650 MILLIGRAM(S): at 18:34

## 2023-04-26 RX ADMIN — Medication 325 MILLIGRAM(S): at 23:38

## 2023-04-26 RX ADMIN — Medication 650 MILLIGRAM(S): at 23:38

## 2023-04-26 RX ADMIN — CYCLOBENZAPRINE HYDROCHLORIDE 10 MILLIGRAM(S): 10 TABLET, FILM COATED ORAL at 13:54

## 2023-04-26 RX ADMIN — ATORVASTATIN CALCIUM 80 MILLIGRAM(S): 80 TABLET, FILM COATED ORAL at 22:32

## 2023-04-26 RX ADMIN — TICAGRELOR 90 MILLIGRAM(S): 90 TABLET ORAL at 05:18

## 2023-04-26 RX ADMIN — Medication 650 MILLIGRAM(S): at 17:00

## 2023-04-26 RX ADMIN — Medication 12.5 MILLIGRAM(S): at 17:00

## 2023-04-26 RX ADMIN — Medication 650 MILLIGRAM(S): at 12:39

## 2023-04-26 RX ADMIN — TICAGRELOR 90 MILLIGRAM(S): 90 TABLET ORAL at 17:00

## 2023-04-26 RX ADMIN — HEPARIN SODIUM 5000 UNIT(S): 5000 INJECTION INTRAVENOUS; SUBCUTANEOUS at 13:54

## 2023-04-26 RX ADMIN — Medication 650 MILLIGRAM(S): at 11:03

## 2023-04-26 RX ADMIN — HEPARIN SODIUM 5000 UNIT(S): 5000 INJECTION INTRAVENOUS; SUBCUTANEOUS at 05:18

## 2023-04-26 NOTE — PROGRESS NOTE ADULT - SUBJECTIVE AND OBJECTIVE BOX
Events:    Mr. Ramirez reports new sharp chest pain starting today. The pain is positional, pleuritic, and different in nature than pain during MI which he describes as squeezing. The pain has somewhat responded to acetaminophen.    No significant ECG changes noted.    Vital Signs Last 24 Hrs  T(C): 36.8 (26 Apr 2023 11:35), Max: 38.6 (26 Apr 2023 00:00)  T(F): 98.3 (26 Apr 2023 11:35), Max: 101.5 (26 Apr 2023 00:00)  HR: 89 (26 Apr 2023 11:35) (78 - 90)  BP: 95/59 (26 Apr 2023 11:35) (88/56 - 115/75)  BP(mean): 71 (26 Apr 2023 11:35) (67 - 90)  RR: 18 (26 Apr 2023 11:35) (18 - 34)  SpO2: 95% (26 Apr 2023 11:35) (94% - 98%)    PHYSICAL EXAM:  GENERAL: No acute distress, well-developed  NECK: No JVD  CHEST/LUNG: CTAB; No wheezes, rales, or rhonchi  HEART: Regular rate and rhythm. Normal S1/S2. No murmurs, rubs, or gallops  ABDOMEN: Soft, non-tender, non-distended; normal bowel sounds, no organomegaly  EXTREMITIES:  2+ peripheral pulses b/l, No clubbing, cyanosis, or edema  NEUROLOGY: A&O x 3, no focal deficits  SKIN: Femoral sites CDI, no pain on palpation    Telemetry:   80-90    Labs:  04-26    136  |  103  |  13  ----------------------------<  119<H>  3.9   |  20<L>  |  0.87    Ca    8.9      26 Apr 2023 07:13  Phos  2.4     04-26  Mg     2.1     04-26                          11.8   12.48 )-----------( 195      ( 26 Apr 2023 07:12 )             35.5     MEDICATIONS  (STANDING):  acetaminophen     Tablet .. 650 milliGRAM(s) Oral every 6 hours  aspirin enteric coated 81 milliGRAM(s) Oral daily  atorvastatin 80 milliGRAM(s) Oral at bedtime  coronavirus bivalent (EUA) Booster Vaccine (PFIZER) 0.3 milliLiter(s) IntraMuscular once  heparin   Injectable 5000 Unit(s) SubCutaneous every 8 hours  insulin lispro (ADMELOG) corrective regimen sliding scale   SubCutaneous three times a day before meals  insulin lispro (ADMELOG) corrective regimen sliding scale   SubCutaneous at bedtime  metoprolol tartrate 12.5 milliGRAM(s) Oral two times a day  ticagrelor 90 milliGRAM(s) Oral every 12 hours

## 2023-04-26 NOTE — PROGRESS NOTE ADULT - PROBLEM SELECTOR PLAN 3
Reported having severe L shoulder pain, but noted that it is a chronic issue that occurs intermittently and typically relieved by NSAIDs. Possibly 2/2 MSK etiology vs ?gout vs related to pt's CAD/TVD.  - unclear at this time if cause of uptrending leukocytosis.  - f/u L shoulder XR  - pain control PRN

## 2023-04-26 NOTE — PROGRESS NOTE ADULT - ASSESSMENT
62M  CAD s/p stent LAD 2013   DM2  Tobacco    Admitted with chest pain  Anterior STEMI  S/p YADIRA pLAD  Residual obstructive disease in LCX/OM  CABG declined  S/p IABP d/c 4/25  Severely reduced LVF EF 33%    Differential for chest pain includes russel-infarct pericarditis - recommend acetaminophen (high dose ASA may be considered with prolonged symptoms)    Obtain TTE - mechanical complication unlikely given hemodynamic stability    Continue DAPT, statin, and beta blocker    Start captopril 6.25 mg daily    A total of 50 minutes was spent on this patient encounter.

## 2023-04-26 NOTE — PROGRESS NOTE ADULT - SUBJECTIVE AND OBJECTIVE BOX
[facility] Division of Hospital Medicine  Travis Blevins MD  Available via MS Teams  Pager [phone]    SUBJECTIVE / OVERNIGHT EVENTS: Patient seen and examined at bedside, resting comfortably and in no acute distress. Endorses chest pain, denies palpitations. Denies shortness of breath or cough. Denies nausea or vomiting. ROS otherwise negative.      MEDICATIONS  (STANDING):  aspirin enteric coated 81 milliGRAM(s) Oral daily  atorvastatin 80 milliGRAM(s) Oral at bedtime  coronavirus bivalent (EUA) Booster Vaccine (PFIZER) 0.3 milliLiter(s) IntraMuscular once  heparin   Injectable 5000 Unit(s) SubCutaneous every 8 hours  insulin lispro (ADMELOG) corrective regimen sliding scale   SubCutaneous at bedtime  insulin lispro (ADMELOG) corrective regimen sliding scale   SubCutaneous three times a day before meals  metoprolol tartrate 12.5 milliGRAM(s) Oral two times a day  ticagrelor 90 milliGRAM(s) Oral every 12 hours    MEDICATIONS  (PRN):  aluminum hydroxide/magnesium hydroxide/simethicone Suspension 30 milliLiter(s) Oral once PRN Dyspepsia      I&O's Summary    25 Apr 2023 07:01  -  26 Apr 2023 07:00  --------------------------------------------------------  IN: 1330 mL / OUT: 1700 mL / NET: -370 mL        PHYSICAL EXAM:  Vital Signs Last 24 Hrs  T(C): 36.8 (26 Apr 2023 11:35), Max: 38.6 (26 Apr 2023 00:00)  T(F): 98.3 (26 Apr 2023 11:35), Max: 101.5 (26 Apr 2023 00:00)  HR: 89 (26 Apr 2023 11:35) (75 - 92)  BP: 95/59 (26 Apr 2023 11:35) (88/56 - 115/75)  BP(mean): 71 (26 Apr 2023 11:35) (67 - 90)  RR: 18 (26 Apr 2023 11:35) (18 - 34)  SpO2: 95% (26 Apr 2023 11:35) (94% - 98%)    Parameters below as of 26 Apr 2023 04:44  Patient On (Oxygen Delivery Method): room air      CONSTITUTIONAL: NAD, well-groomed  EYES: PERRLA; conjunctiva and sclera clear  ENMT: Moist oral mucosa, no pharyngeal injection or exudates; normal dentition  NECK: Supple, no palpable masses; no thyromegaly  RESPIRATORY: Normal respiratory effort; lungs are clear to auscultation bilaterally  CARDIOVASCULAR: normal S1 and S2, no murmur/rub/gallop; pain to palpation of the chest  ABDOMEN: Nontender to palpation, normoactive bowel sounds, no rebound/guarding; No hepatosplenomegaly  MUSCULOSKELETAL:  no clubbing or cyanosis of digits; no joint swelling or tenderness to palpation  PSYCH: A+O to person, place, and time; affect appropriate  NEUROLOGY: CN 2-12 are intact and symmetric; no gross sensory deficits   SKIN: No rashes; no palpable lesions    LABS:                        11.8   12.48 )-----------( 195      ( 26 Apr 2023 07:12 )             35.5     04-26    136  |  103  |  13  ----------------------------<  119<H>  3.9   |  20<L>  |  0.87    Ca    8.9      26 Apr 2023 07:13  Phos  2.4     04-26  Mg     2.1     04-26    TPro  6.6  /  Alb  3.7  /  TBili  1.0  /  DBili  x   /  AST  48<H>  /  ALT  16  /  AlkPhos  87  04-26    PT/INR - ( 25 Apr 2023 03:06 )   PT: 13.7 sec;   INR: 1.19 ratio         PTT - ( 25 Apr 2023 09:12 )  PTT:30.6 sec  CARDIAC MARKERS ( 26 Apr 2023 10:47 )  x     / x     / 302 U/L / x     / 3.3 ng/mL  CARDIAC MARKERS ( 25 Apr 2023 03:06 )  x     / x     / 1127 U/L / x     / 14.4 ng/mL    COVID-19 PCR: NotDetec (23 Apr 2023 03:50)

## 2023-04-26 NOTE — PROGRESS NOTE ADULT - PROBLEM SELECTOR PLAN 2
TTE (4/24/23, after initial LHC on 4/23/23 AM) showed EF 33% w/o LV thrombus, multiple segmental abnormalities/WMAs, trace MR. Subsequently had another LHC on 4/23/23 PM for high risk PCI with DESx2 placed in LAD. Acute HFrEF likely in setting of ICM from TVD c/b STEMI.  - TSH wnl; lipids elevated  - no overt signs of volume overload at this time

## 2023-04-26 NOTE — PROGRESS NOTE ADULT - PROBLEM SELECTOR PLAN 1
Presented with AWSTEMI, s/p LHC (4/23/23 AM) and found to have TVD, s/p IABP placement and removal. Evaluated for CABG, but pt/family opted for LHC with high risk PCI (4/23/23 PM), now s/p DESx2 to pLAD and mLAD.  - c/w DAPT (ASA/brilinta), atorvastatin  - c/w GDMT: lopressor 12.5mg BID; captopril was dc'ed in CICU due to labile BPs, but will consider adding Losartan if BP stable  - hsTrop downtrending  - keep K>4, Mg>2  - Per interventional cardiology, plan for staged PCI of OM1/LCx as an outpatient.  - f/u further Cardiology recommendations

## 2023-04-26 NOTE — PROGRESS NOTE ADULT - PROBLEM SELECTOR PLAN 4
Hx of DM2. At home, reportedly took Metformin 500mg intermittently when eating poorly. Previously took it every day and stated his HbA1c was ~6%.  - Last HbA1c (4/23/23): 7.1%  - c/w mod ISS for now  - monitor FS qAC and qHS  - can consider discharging on Metformin and following up with PCP/Endocrinology

## 2023-04-26 NOTE — PROGRESS NOTE ADULT - PROBLEM SELECTOR PLAN 5
Pt was a current every day smoker prior to admission. Reported smoking for the past 25 yrs, typically 1 pack over 2-4 days.  - Discussed risks/benefits and counseled on smoking cessation, pt seemed ready and motivated on quitting smoking, informed him that resources are available to help him quit smoking if he is interested, but he would like to try by himself for now  - declining nicotine patch at this time

## 2023-04-27 ENCOUNTER — TRANSCRIPTION ENCOUNTER (OUTPATIENT)
Age: 63
End: 2023-04-27

## 2023-04-27 DIAGNOSIS — I31.9 DISEASE OF PERICARDIUM, UNSPECIFIED: ICD-10-CM

## 2023-04-27 LAB
ANION GAP SERPL CALC-SCNC: 15 MMOL/L — SIGNIFICANT CHANGE UP (ref 5–17)
BUN SERPL-MCNC: 15 MG/DL — SIGNIFICANT CHANGE UP (ref 7–23)
CALCIUM SERPL-MCNC: 9.3 MG/DL — SIGNIFICANT CHANGE UP (ref 8.4–10.5)
CHLORIDE SERPL-SCNC: 101 MMOL/L — SIGNIFICANT CHANGE UP (ref 96–108)
CO2 SERPL-SCNC: 19 MMOL/L — LOW (ref 22–31)
CREAT SERPL-MCNC: 0.81 MG/DL — SIGNIFICANT CHANGE UP (ref 0.5–1.3)
EGFR: 101 ML/MIN/1.73M2 — SIGNIFICANT CHANGE UP
GLUCOSE BLDC GLUCOMTR-MCNC: 101 MG/DL — HIGH (ref 70–99)
GLUCOSE BLDC GLUCOMTR-MCNC: 122 MG/DL — HIGH (ref 70–99)
GLUCOSE BLDC GLUCOMTR-MCNC: 130 MG/DL — HIGH (ref 70–99)
GLUCOSE BLDC GLUCOMTR-MCNC: 195 MG/DL — HIGH (ref 70–99)
GLUCOSE SERPL-MCNC: 111 MG/DL — HIGH (ref 70–99)
HCT VFR BLD CALC: 36.2 % — LOW (ref 39–50)
HGB BLD-MCNC: 12.1 G/DL — LOW (ref 13–17)
MCHC RBC-ENTMCNC: 30 PG — SIGNIFICANT CHANGE UP (ref 27–34)
MCHC RBC-ENTMCNC: 33.4 GM/DL — SIGNIFICANT CHANGE UP (ref 32–36)
MCV RBC AUTO: 89.6 FL — SIGNIFICANT CHANGE UP (ref 80–100)
NRBC # BLD: 0 /100 WBCS — SIGNIFICANT CHANGE UP (ref 0–0)
PLATELET # BLD AUTO: 264 K/UL — SIGNIFICANT CHANGE UP (ref 150–400)
POTASSIUM SERPL-MCNC: 3.8 MMOL/L — SIGNIFICANT CHANGE UP (ref 3.5–5.3)
POTASSIUM SERPL-SCNC: 3.8 MMOL/L — SIGNIFICANT CHANGE UP (ref 3.5–5.3)
RBC # BLD: 4.04 M/UL — LOW (ref 4.2–5.8)
RBC # FLD: 13.2 % — SIGNIFICANT CHANGE UP (ref 10.3–14.5)
SODIUM SERPL-SCNC: 135 MMOL/L — SIGNIFICANT CHANGE UP (ref 135–145)
WBC # BLD: 13.85 K/UL — HIGH (ref 3.8–10.5)
WBC # FLD AUTO: 13.85 K/UL — HIGH (ref 3.8–10.5)

## 2023-04-27 PROCEDURE — 99232 SBSQ HOSP IP/OBS MODERATE 35: CPT

## 2023-04-27 PROCEDURE — 93308 TTE F-UP OR LMTD: CPT | Mod: 26

## 2023-04-27 RX ORDER — TICAGRELOR 90 MG/1
1 TABLET ORAL
Qty: 60 | Refills: 0
Start: 2023-04-27 | End: 2023-05-26

## 2023-04-27 RX ORDER — ASPIRIN/CALCIUM CARB/MAGNESIUM 324 MG
81 TABLET ORAL DAILY
Refills: 0 | Status: DISCONTINUED | OUTPATIENT
Start: 2023-04-27 | End: 2023-04-28

## 2023-04-27 RX ORDER — CLOPIDOGREL BISULFATE 75 MG/1
75 TABLET, FILM COATED ORAL DAILY
Refills: 0 | Status: DISCONTINUED | OUTPATIENT
Start: 2023-04-28 | End: 2023-04-28

## 2023-04-27 RX ORDER — ACETAMINOPHEN 500 MG
1000 TABLET ORAL ONCE
Refills: 0 | Status: COMPLETED | OUTPATIENT
Start: 2023-04-27 | End: 2023-04-27

## 2023-04-27 RX ORDER — CLOPIDOGREL BISULFATE 75 MG/1
300 TABLET, FILM COATED ORAL ONCE
Refills: 0 | Status: COMPLETED | OUTPATIENT
Start: 2023-04-27 | End: 2023-04-27

## 2023-04-27 RX ADMIN — HEPARIN SODIUM 5000 UNIT(S): 5000 INJECTION INTRAVENOUS; SUBCUTANEOUS at 13:04

## 2023-04-27 RX ADMIN — Medication 650 MILLIGRAM(S): at 05:58

## 2023-04-27 RX ADMIN — HEPARIN SODIUM 5000 UNIT(S): 5000 INJECTION INTRAVENOUS; SUBCUTANEOUS at 05:58

## 2023-04-27 RX ADMIN — CLOPIDOGREL BISULFATE 300 MILLIGRAM(S): 75 TABLET, FILM COATED ORAL at 17:40

## 2023-04-27 RX ADMIN — HEPARIN SODIUM 5000 UNIT(S): 5000 INJECTION INTRAVENOUS; SUBCUTANEOUS at 21:26

## 2023-04-27 RX ADMIN — Medication 81 MILLIGRAM(S): at 21:33

## 2023-04-27 RX ADMIN — CAPTOPRIL 6.25 MILLIGRAM(S): 12.5 TABLET ORAL at 05:59

## 2023-04-27 RX ADMIN — Medication 650 MILLIGRAM(S): at 11:11

## 2023-04-27 RX ADMIN — Medication 12.5 MILLIGRAM(S): at 17:40

## 2023-04-27 RX ADMIN — ATORVASTATIN CALCIUM 80 MILLIGRAM(S): 80 TABLET, FILM COATED ORAL at 21:24

## 2023-04-27 RX ADMIN — Medication 12.5 MILLIGRAM(S): at 05:58

## 2023-04-27 RX ADMIN — Medication 400 MILLIGRAM(S): at 21:26

## 2023-04-27 RX ADMIN — Medication 325 MILLIGRAM(S): at 06:03

## 2023-04-27 RX ADMIN — TICAGRELOR 90 MILLIGRAM(S): 90 TABLET ORAL at 05:58

## 2023-04-27 RX ADMIN — Medication 2: at 08:59

## 2023-04-27 NOTE — DISCHARGE NOTE PROVIDER - NSDCCPCAREPLAN_GEN_ALL_CORE_FT
PRINCIPAL DISCHARGE DIAGNOSIS  Diagnosis: Triple vessel coronary artery disease  Assessment and Plan of Treatment: presented with AWSTEMI, s/p LHC (4/23/23 AM) and found to have TVD, s/p IABP placement and removal. Evaluated for CABG, but pt/family opted for LHC with high risk PCI (4/23/23 PM), now s/p DESx2 to pLAD and mLAD.  - c/w DAPT (ASA/brilinta), atorvastatin  - c/w GDMT: lopressor 12.5mg BID; captopril was dc'ed in CICU due to labile BPs, but will consider adding Losartan if BP stable  - hsTrop downtrending  - Per interventional cardiology, plan for staged PCI of OM1/LCx as an outpatient  Coronary artery disease is a condition where the arteries the supply the heart muscle get clogges with fatty deposits & puts you at risk for a heart attack  Call your doctor if you have any new pain, pressure, or discomfort in the center of your chest, pain, tingling or discomfort in arms, back, neck, jaw, or stomach, shortness of breath, nausea, vomiting, burping or heartburn, sweating, cold and clammy skin, racing or abnormal heartbeat for more than 10 minutes or if they keep coming & going.  Call 911 and do not tr to get to hospital by care  You can help yourself with lefestyle changes (quitting smoking if you smoke), eat lots of fruits & vegetables & low fat dairy products, not a lot of meat & fatty foods, walk or some form of physical activity most days of the week, lose weight if you are overweight  Take your cardiac medication as prescribed to lower cholesterol, to lower blood pressure, aspirin to prevent blood clots, and diabetes control  Make sure to keep appointments with doctor for cardiac follow up care       PRINCIPAL DISCHARGE DIAGNOSIS  Diagnosis: Triple vessel coronary artery disease  Assessment and Plan of Treatment: presented with Anterior Wall STEMI, s/p Left Heart Cath (LHC) (4/23/23 AM) and found to have Triple Vessel Dz, s/p IABP placement and removal. Evaluated for CABG, but patient/family opted for LHC with high risk PCI (4/23/23 PM), now s/p Drug Elluting Stent x2 to pLAD and mLAD.  - Per interventional cardiology, plan for staged PCI of OM1/Left Cx as an outpatient  Coronary artery disease is a condition where the arteries the supply the heart muscle get clogges with fatty deposits & puts you at risk for a heart attack  Call your doctor if you have any new pain, pressure, or discomfort in the center of your chest, pain, tingling or discomfort in arms, back, neck, jaw, or stomach, shortness of breath, nausea, vomiting, burping or heartburn, sweating, cold and clammy skin, racing or abnormal heartbeat for more than 10 minutes or if they keep coming & going.  Call 911 and do not tr to get to hospital by care  You can help yourself with lefestyle changes (quitting smoking if you If you are on medication to help you quit smoking, be sure to take it as prescribed. Find healthy ways to deal with stress, such as exercise (check with your healthcare provider first), deep breathing, meditation, or enjoyable healthy hobbies.  Avoid situations that may cause you to smoke a cigarette.  Look for help with quitting; you are not alone. A resource to help you stop smoking is the [facility] – phone number 735-588-6016.), eat lots of fruits & vegetables & low fat dairy products, not a lot of meat & fatty foods, walk or some form of physical activity most days of the week, lose weight if you are overweight  Take your cardiac medication as prescribed to lower cholesterol, to lower blood pressure, aspirin to prevent blood clots, and diabetes control  Make sure to keep appointments with doctor for cardiac follow up care        SECONDARY DISCHARGE DIAGNOSES  Diagnosis: Acute HFrEF (heart failure with reduced ejection fraction)  Assessment and Plan of Treatment: Take all medications as prescribed.  Stop smoking if you currently If you are on medication to help you quit smoking, be sure to take it as prescribed. Find healthy ways to deal with stress, such as exercise (check with your healthcare provider first), deep breathing, meditation, or enjoyable healthy hobbies.  Avoid situations that may cause you to smoke a cigarette.  Look for help with quitting; you are not alone. A resource to help you stop smoking is the [facility] – phone number 515-593-9517., and avoid high altitudes.  Weigh yourself daily.  If you gain 3lbs in 3 days, or 5lbs in a week call your Health Care Provider.  Eat a low sodium diet.  If you have pulmonary hypertension and you are a female of child bearing age, talk to your caregiver about using birth control pills or getting pregnant.  Call your Health Care Provider if you have any swelling or increased swelling in your feet, ankles, and/or stomach.  If you experience dizziness, chest pain, or shortness of breath, seek immediate medical attention.      Diagnosis: Left shoulder pain  Assessment and Plan of Treatment: Left shoulder x-ray negative for fracture/dislocation  Follow-up with your primary care physician within 1 week. Call for appointment.  Please bring all discharge paperwork and list of medications to all follow up appointments  Please call for follow up appointments one day after discharge      Diagnosis: Current every day smoker  Assessment and Plan of Treatment: Smoking cessation has been strongly encouraged.  Utilize smoking cessation medication as ordered.    [facility] – [phone].  The [facility] Smokers' Quitline  Provides free, confidential services that include information, tools, quit coaching and support in English and [place]. Provides FREE starter kits of nicotine replacement therapy (NRT) patches to eligible [place]. The Quitline also works with employers, health plans and health care providers to ensure [place] tobacco users have access to effective cessation treatments. To learn more:  Call 5-618-TJ-QUITS (1-407.778.7530) toll free, or  Visit [email or web address].      Diagnosis: DM2 (diabetes mellitus, type 2)  Assessment and Plan of Treatment: Make sure you get your HgA1c checked every three months.  If you take oral diabetes medications, check your blood glucose at least two times a day.  If you take short-acting insulin, check your blood glucose before meals and at bedtime.  It's important not to skip any meals.  Keep a log of your blood glucose results and always take it with you to your doctor appointments.  Keep a list of your current medications including over the counter medications and bring this medication list with you to all your doctor appointments.  If you have not seen your ophthalmologist this year, call for appointment.  Check your feet daily for redness, sores, or openings.  Do not self treat.  If there is no improvement in two days, call your primary care physician for an appointment.  HgA1c this admission was 7.1

## 2023-04-27 NOTE — DISCHARGE NOTE PROVIDER - HOSPITAL COURSE
62M w/ hx of current every day smoker, DM2, CAD s/p YADIRA to LAD (2013), presenting initially with CP, found to have AWSTEMI and admitted to CICU. s/p LHC and found to have TVD, s/p IABP placement and removal. Evaluated for CABG, but pt/family opted for LHC with high risk PCI, now s/p DESx2 to pLAD and mLAD (4/23/23). Initial TTE showed significantly reduced LVEF of 33%. Transferred to Medicine for further management.    ·  Problem: Triple vessel coronary artery disease.   ·  Plan: Presented with AWSTEMI, s/p LHC (4/23/23 AM) and found to have TVD, s/p IABP placement and removal. Evaluated for CABG, but pt/family opted for LHC with high risk PCI (4/23/23 PM), now s/p DESx2 to pLAD and mLAD.  - c/w DAPT (ASA/brilinta), atorvastatin  - c/w GDMT: lopressor 12.5mg BID; captopril was dc'ed in CICU due to labile BPs, but will consider adding Losartan if BP stable  - hsTrop downtrending  - keep K>4, Mg>2  - Per interventional cardiology, plan for staged PCI of OM1/LCx as an outpatient.    ·  Problem: Acute HFrEF (heart failure with reduced ejection fraction).   ·  Plan: TTE (4/24/23, after initial LHC on 4/23/23 AM) showed EF 33% w/o LV thrombus, multiple segmental abnormalities/WMAs, trace MR. Subsequently had another LHC on 4/23/23 PM for high risk PCI with DESx2 placed in LAD. Acute HFrEF likely in setting of ICM from TVD c/b STEMI.  - TSH wnl; lipids elevated  - no overt signs of volume overload at this time.    ·  Problem: Left shoulder pain.   ·  Plan: Reported having severe L shoulder pain, but noted that it is a chronic issue that occurs intermittently and typically relieved by NSAIDs. Possibly 2/2 MSK etiology vs ?gout vs related to pt's CAD/TVD.  - unclear at this time if cause of uptrending leukocytosis.  - f/u L shoulder XR> no fracture or dislocation  - pain control PRN.    ·  Problem: DM2 (diabetes mellitus, type 2).   ·  Plan: Hx of DM2. At home, reportedly took Metformin 500mg intermittently when eating poorly. Previously took it every day and stated his HbA1c was ~6%.  - Last HbA1c (4/23/23): 7.1%  - c/w mod ISS for now  - monitor FS qAC and qHS  - can consider discharging on Metformin and following up with PCP/Endocrinology.    ·  Problem: Current every day smoker.   ·  Plan: Pt was a current every day smoker prior to admission. Reported smoking for the past 25 yrs, typically 1 pack over 2-4 days.  - Discussed risks/benefits and counseled on smoking cessation, pt seemed ready and motivated on quitting smoking, informed him that resources are available to help him quit smoking if he is interested, but he would like to try by himself for now  - declining nicotine patch at this time.         62 year old male with PMHx of current every day smoker, DM2, CAD s/p YADIRA to LAD (2013), presented to ED  with CP, found to have AWSTEMI and admitted to CICU. s/p LHC and found to have TVD, s/p IABP placement and removal. Evaluated for CABG, but pt/family opted for LHC with high risk PCI, now s/p DESx2 to pLAD and mLAD (4/23/23). Initial TTE showed significantly reduced LVEF of 33%. Acute HFrEF likely in setting of ICM from TVD c/b STEMI. Transferred to Medicine for further management. No overt signs of volume overload at this time. Patient to continue with DAPT (ASA/brilinta), atorvastatin and GDMT. Per interventional cardiology, plan for staged PCI of OM1/LCx as an outpatient.    During hospital course, patient reported having severe left shoulder pain, but noted that it is a chronic issue that occurs intermittently and typically relieved by NSAIDs. L shoulder XRay was obtained revealing no fracture or dislocation. HbA1c on this admission (4/23/23): 7.1% - recommended to follow up with PCP/Endocrinology for DM2 management.     Of note, patient reported smoking for the past 25 yrs, typically 1 pack over 2-4 days. Discussed risks/benefits and counseled on smoking cessation and informed of resources available to help patient quit smoking if he is interested, but he would like to try by himself for now - declining nicotine patch at this time.    Discharge/Dispo/Med rec discussed with attending  ____. Patient medically cleared for discharge ____ with outpatient follow up.          62 year old male with PMHx of current every day smoker, DM2, CAD s/p YADIRA to LAD (2013), presented to ED  with CP, found to have AWSTEMI and admitted to CICU. s/p LHC and found to have TVD, s/p IABP placement and removal. Evaluated for CABG, but pt/family opted for LHC with high risk PCI, now s/p DESx2 to pLAD and mLAD (4/23/23). Initial TTE showed significantly reduced LVEF of 33%. Acute HFrEF likely in setting of ICM from TVD c/b STEMI. Transferred to Medicine for further management. No overt signs of volume overload at this time. Patient to continue with DAPT (ASA/brilinta), atorvastatin and GDMT. Per interventional cardiology, plan for staged PCI of OM1/LCx as an outpatient.    During hospital course, patient reported having severe left shoulder pain, but noted that it is a chronic issue that occurs intermittently and typically relieved by NSAIDs. L shoulder XRay was obtained revealing no fracture or dislocation. HbA1c on this admission (4/23/23): 7.1% - recommended to follow up with PCP/Endocrinology for DM2 management.     Of note, patient reported smoking for the past 25 yrs, typically 1 pack over 2-4 days. Discussed risks/benefits and counseled on smoking cessation and informed of resources available to help patient quit smoking if he is interested, but he would like to try by himself for now - declining nicotine patch at this time.    Discharge/Dispo/Med rec discussed with attending Dr. Blevins. Patient medically cleared for discharge with outpatient follow up.

## 2023-04-27 NOTE — PROGRESS NOTE ADULT - SUBJECTIVE AND OBJECTIVE BOX
[facility] Division of Hospital Medicine  Travis Blevins MD  Available via MS Teams  Pager [phone]    SUBJECTIVE / OVERNIGHT EVENTS: Patient seen and examined at bedside, resting comfortably and in no acute distress. Reports chest pain is improving and denies palpitations. Denies shortness of breath or cough. Denies abdominal pain, nausea or vomiting. ROS otherwise noncontributory.     MEDICATIONS  (STANDING):  aspirin 81 milliGRAM(s) Oral daily  atorvastatin 80 milliGRAM(s) Oral at bedtime  captopril 6.25 milliGRAM(s) Oral daily  coronavirus bivalent (EUA) Booster Vaccine (PFIZER) 0.3 milliLiter(s) IntraMuscular once  heparin   Injectable 5000 Unit(s) SubCutaneous every 8 hours  insulin lispro (ADMELOG) corrective regimen sliding scale   SubCutaneous three times a day before meals  insulin lispro (ADMELOG) corrective regimen sliding scale   SubCutaneous at bedtime  metoprolol tartrate 12.5 milliGRAM(s) Oral two times a day  ticagrelor 90 milliGRAM(s) Oral every 12 hours    MEDICATIONS  (PRN):  aluminum hydroxide/magnesium hydroxide/simethicone Suspension 30 milliLiter(s) Oral once PRN Dyspepsia      I&O's Summary    26 Apr 2023 07:01  -  27 Apr 2023 07:00  --------------------------------------------------------  IN: 360 mL / OUT: 250 mL / NET: 110 mL        PHYSICAL EXAM:  Vital Signs Last 24 Hrs  T(C): 36.5 (27 Apr 2023 11:15), Max: 37.7 (27 Apr 2023 01:00)  T(F): 97.7 (27 Apr 2023 11:15), Max: 99.9 (27 Apr 2023 01:00)  HR: 81 (27 Apr 2023 11:15) (78 - 89)  BP: 104/68 (27 Apr 2023 11:15) (97/63 - 104/68)  BP(mean): 80 (27 Apr 2023 11:15) (78 - 80)  RR: 18 (27 Apr 2023 11:15) (18 - 18)  SpO2: 96% (27 Apr 2023 11:15) (94% - 96%)    Parameters below as of 27 Apr 2023 11:15  Patient On (Oxygen Delivery Method): room air      CONSTITUTIONAL: NAD, well-groomed  EYES: PERRLA; conjunctiva and sclera clear  ENMT: Moist oral mucosa, no pharyngeal injection or exudates; normal dentition  NECK: Supple, no palpable masses; no thyromegaly  RESPIRATORY: Normal respiratory effort; lungs are clear to auscultation bilaterally  CARDIOVASCULAR: normal S1 and S2, no murmur/rub/gallop; No lower extremity edema  ABDOMEN: Nontender to palpation, normoactive bowel sounds, no rebound/guarding; No hepatosplenomegaly  MUSCULOSKELETAL:  no clubbing or cyanosis of digits; no joint swelling or tenderness to palpation  PSYCH: A+O to person, place, and time; affect appropriate  NEUROLOGY: CN 2-12 are intact and symmetric; no gross sensory deficits   SKIN: No rashes; no palpable lesions    LABS:                        12.1   13.85 )-----------( 264      ( 27 Apr 2023 07:20 )             36.2     04-27    135  |  101  |  15  ----------------------------<  111<H>  3.8   |  19<L>  |  0.81    Ca    9.3      27 Apr 2023 07:20  Phos  2.4     04-26  Mg     2.1     04-26    TPro  6.6  /  Alb  3.7  /  TBili  1.0  /  DBili  x   /  AST  48<H>  /  ALT  16  /  AlkPhos  87  04-26      CARDIAC MARKERS ( 26 Apr 2023 10:47 )  x     / x     / 302 U/L / x     / 3.3 ng/mL          COVID-19 PCR: NotDetec (23 Apr 2023 03:50)

## 2023-04-27 NOTE — PROGRESS NOTE ADULT - PROBLEM SELECTOR PROBLEM 1
Triple vessel coronary artery disease

## 2023-04-27 NOTE — DISCHARGE NOTE PROVIDER - NSDCFUADDAPPT_GEN_ALL_CORE_FT
Please followup with cardiology in 1-2 weeks. you will need a staged PCI APPTS ARE READY TO BE MADE: [x] YES    Best Family or Patient Contact (if needed):    Additional Information about above appointments (if needed):    1: Please followup with cardiology in 1-2 weeks. you will need a staged PCI  2:   3:     Other comments or requests:    APPTS ARE READY TO BE MADE: [x] YES    Best Family or Patient Contact (if needed):    Additional Information about above appointments (if needed):    1: Please followup with cardiology in 1-2 weeks. you will need a staged PCI  2:   3:     Other comments or requests:   Patient was previously scheduled with Dr. Genie Michel on 5/4 at 3:45pm at [address].

## 2023-04-27 NOTE — DISCHARGE NOTE PROVIDER - ATTENDING DISCHARGE PHYSICAL EXAMINATION:
Constitutional: WDWN resting comfortably in bed; NAD  Head: NC/AT  Eyes: PERRL, EOMI, anicteric sclera  ENT: no nasal discharge; uvula midline, no oropharyngeal erythema or exudates; MMM  Neck: supple; no JVD or thyromegaly  Respiratory: CTA B/L; no W/R/R, no retractions  Cardiac: +S1/S2; RRR; no M/R/G; PMI non-displaced, no friction rub   Gastrointestinal: abdomen soft, NT/ND; no rebound or guarding; +BSx4  Back: spine midline, no bony tenderness or step-offs; no CVAT B/L  Extremities: WWP, no clubbing or cyanosis; no peripheral edema  Musculoskeletal: NROM x4; no joint swelling, tenderness or erythema  Vascular: 2+ radial, femoral, DP/PT pulses B/L  Dermatologic: skin warm, dry and intact; no rashes, wounds, or scars  Lymphatic: no submandibular or cervical LAD  Neurologic: AAOx3; CNII-XII grossly intact; no focal deficits  Psychiatric: affect and characteristics of appearance, verbalizations, behaviors are appropriate

## 2023-04-27 NOTE — PROGRESS NOTE ADULT - ASSESSMENT
62M  CAD s/p stent LAD 2013   DM2  Tobacco    Admitted with chest pain  Anterior STEMI  S/p YADIRA pLAD  Residual obstructive disease in LCX/OM  CABG declined  S/p IABP d/c 4/25  Severely reduced LVF EF 33%    Post-infarct pericarditis - resolving with acetaminophen     Obtain repeat TTE - mechanical complication unlikely given hemodynamic stability    Continue DAPT (ASA 81 mg daily), statin, beta blocker and ACE    A total of 35 minutes was spent on this patient encounter.

## 2023-04-27 NOTE — PROGRESS NOTE ADULT - SUBJECTIVE AND OBJECTIVE BOX
Events:    Mr. Ramirez reports that the sharp chest pain that started yesterday is resolving but recurs with coughing.    He has been ambulating in the hallways without any difficulty.    Episode of epistaxis noted.    Vital Signs Last 24 Hrs  T(C): 37.1 (27 Apr 2023 05:12), Max: 37.7 (27 Apr 2023 01:00)  T(F): 98.7 (27 Apr 2023 05:12), Max: 99.9 (27 Apr 2023 01:00)  HR: 78 (27 Apr 2023 05:12) (78 - 89)  BP: 97/63 (27 Apr 2023 05:12) (95/59 - 102/66)  BP(mean): 78 (26 Apr 2023 20:47) (71 - 78)  RR: 18 (27 Apr 2023 05:12) (18 - 18)  SpO2: 95% (27 Apr 2023 05:12) (94% - 95%)      PHYSICAL EXAM:  GENERAL: No acute distress, well-developed  NECK: No JVD  CHEST/LUNG: CTAB; No wheezes, rales, or rhonchi  HEART: Regular rate and rhythm. Normal S1/S2. No murmurs, rubs, or gallops  ABDOMEN: Soft, non-tender, non-distended; normal bowel sounds, no organomegaly  EXTREMITIES:  2+ peripheral pulses b/l, No clubbing, cyanosis, or edema  NEUROLOGY: A&O x 3, no focal deficits  SKIN: Femoral sites CDI, no pain on palpation    Telemetry:   70-19    Labs:                        12.1   13.85 )-----------( 264      ( 27 Apr 2023 07:20 )             36.2                           12.1   13.85 )-----------( 264      ( 27 Apr 2023 07:20 )             36.2     MEDICATIONS  (STANDING):  acetaminophen     Tablet .. 650 milliGRAM(s) Oral every 6 hours  atorvastatin 80 milliGRAM(s) Oral at bedtime  captopril 6.25 milliGRAM(s) Oral daily  coronavirus bivalent (EUA) Booster Vaccine (PFIZER) 0.3 milliLiter(s) IntraMuscular once  heparin   Injectable 5000 Unit(s) SubCutaneous every 8 hours  insulin lispro (ADMELOG) corrective regimen sliding scale   SubCutaneous at bedtime  insulin lispro (ADMELOG) corrective regimen sliding scale   SubCutaneous three times a day before meals  metoprolol tartrate 12.5 milliGRAM(s) Oral two times a day  ticagrelor 90 milliGRAM(s) Oral every 12 hours

## 2023-04-27 NOTE — DISCHARGE NOTE PROVIDER - CARE PROVIDER_API CALL
Wilmar Arthur)  Cardiology; Internal Medicine; Interventional Cardiology  [address] [place] [address]  Phone: [phone]  Fax: [phone]  Follow Up Time: 1 week

## 2023-04-27 NOTE — DISCHARGE NOTE PROVIDER - NSDCFUSCHEDAPPT_GEN_ALL_CORE_FT
[facility] Physician [facility]  CARDIOLOGY [address]  Scheduled Appointment: 05/04/2023

## 2023-04-27 NOTE — DISCHARGE NOTE PROVIDER - NSDCMRMEDTOKEN_GEN_ALL_CORE_FT
Aspir 81 oral delayed release tablet: 1 orally 3 times a week  metFORMIN 500 mg oral tablet: 1 tab(s) orally was taking intermittently when eating poorly  simvastatin 20 mg oral tablet: 1 tab(s) orally was taking intermittently taking in the past, but self-discontinued 1 week prior   Aspir 81 oral delayed release tablet: 1 orally 3 times a week  metFORMIN 500 mg oral tablet: 1 tab(s) orally was taking intermittently when eating poorly  simvastatin 20 mg oral tablet: 1 tab(s) orally was taking intermittently taking in the past, but self-discontinued 1 week prior  ticagrelor 90 mg oral tablet: 1 tab(s) orally every 12 hours   alcohol swabs: Apply topically to affected area 4 times a day  Aspir 81 oral delayed release tablet: 1 orally 3 times a week  captopril 12.5 mg oral tablet: 0.5 tab(s) orally once a day  clopidogrel 75 mg oral tablet: 1 tab(s) orally once a day  glucometer (per patient&#x27;s insurance): Test blood sugars four times a day. Dispense #1 glucometer.  lancets: 1 application subcutaneously 4 times a day  metFORMIN 500 mg oral tablet: 1 tab(s) orally once a day was taking intermittently when eating poorly  metoprolol tartrate 25 mg oral tablet: 0.5 tab(s) orally 2 times a day  simvastatin 20 mg oral tablet: 1 tab(s) orally once a day (at bedtime) was taking intermittently taking in the past, but self-discontinued 1 week prior  test strips (per patient&#x27;s insurance): 1 application subcutaneously 4 times a day. ** Compatible with patient&#x27;s glucometer **  ticagrelor 90 mg oral tablet: 1 tab(s) orally every 12 hours   alcohol swabs: Apply topically to affected area 4 times a day  Aspir 81 oral delayed release tablet: 1 orally 3 times a week  captopril 12.5 mg oral tablet: 0.5 tab(s) orally once a day  clopidogrel 75 mg oral tablet: 1 tab(s) orally once a day  glucometer (per patient&#x27;s insurance): Test blood sugars four times a day. Dispense #1 glucometer.  lancets: 1 application subcutaneously 4 times a day  metFORMIN 500 mg oral tablet: 1 tab(s) orally once a day was taking intermittently when eating poorly  metoprolol tartrate 25 mg oral tablet: 0.5 tab(s) orally 2 times a day  simvastatin 20 mg oral tablet: 1 tab(s) orally once a day (at bedtime) was taking intermittently taking in the past, but self-discontinued 1 week prior  test strips (per patient&#x27;s insurance): 1 application subcutaneously 4 times a day. ** Compatible with patient&#x27;s glucometer **

## 2023-04-28 ENCOUNTER — TRANSCRIPTION ENCOUNTER (OUTPATIENT)
Age: 63
End: 2023-04-28

## 2023-04-28 VITALS
RESPIRATION RATE: 18 BRPM | OXYGEN SATURATION: 96 % | SYSTOLIC BLOOD PRESSURE: 106 MMHG | DIASTOLIC BLOOD PRESSURE: 67 MMHG | TEMPERATURE: 99 F | HEART RATE: 83 BPM

## 2023-04-28 LAB
ANION GAP SERPL CALC-SCNC: 15 MMOL/L — SIGNIFICANT CHANGE UP (ref 5–17)
BUN SERPL-MCNC: 15 MG/DL — SIGNIFICANT CHANGE UP (ref 7–23)
CALCIUM SERPL-MCNC: 9.1 MG/DL — SIGNIFICANT CHANGE UP (ref 8.4–10.5)
CHLORIDE SERPL-SCNC: 101 MMOL/L — SIGNIFICANT CHANGE UP (ref 96–108)
CO2 SERPL-SCNC: 18 MMOL/L — LOW (ref 22–31)
CREAT SERPL-MCNC: 0.8 MG/DL — SIGNIFICANT CHANGE UP (ref 0.5–1.3)
EGFR: 101 ML/MIN/1.73M2 — SIGNIFICANT CHANGE UP
GLUCOSE BLDC GLUCOMTR-MCNC: 123 MG/DL — HIGH (ref 70–99)
GLUCOSE BLDC GLUCOMTR-MCNC: 232 MG/DL — HIGH (ref 70–99)
GLUCOSE BLDC GLUCOMTR-MCNC: 94 MG/DL — SIGNIFICANT CHANGE UP (ref 70–99)
GLUCOSE SERPL-MCNC: 163 MG/DL — HIGH (ref 70–99)
HCT VFR BLD CALC: 36.3 % — LOW (ref 39–50)
HGB BLD-MCNC: 12.3 G/DL — LOW (ref 13–17)
MCHC RBC-ENTMCNC: 30.3 PG — SIGNIFICANT CHANGE UP (ref 27–34)
MCHC RBC-ENTMCNC: 33.9 GM/DL — SIGNIFICANT CHANGE UP (ref 32–36)
MCV RBC AUTO: 89.4 FL — SIGNIFICANT CHANGE UP (ref 80–100)
NRBC # BLD: 0 /100 WBCS — SIGNIFICANT CHANGE UP (ref 0–0)
PLATELET # BLD AUTO: 302 K/UL — SIGNIFICANT CHANGE UP (ref 150–400)
POTASSIUM SERPL-MCNC: 3.7 MMOL/L — SIGNIFICANT CHANGE UP (ref 3.5–5.3)
POTASSIUM SERPL-SCNC: 3.7 MMOL/L — SIGNIFICANT CHANGE UP (ref 3.5–5.3)
RBC # BLD: 4.06 M/UL — LOW (ref 4.2–5.8)
RBC # FLD: 13.2 % — SIGNIFICANT CHANGE UP (ref 10.3–14.5)
SODIUM SERPL-SCNC: 134 MMOL/L — LOW (ref 135–145)
WBC # BLD: 12.3 K/UL — HIGH (ref 3.8–10.5)
WBC # FLD AUTO: 12.3 K/UL — HIGH (ref 3.8–10.5)

## 2023-04-28 PROCEDURE — 86900 BLOOD TYPING SEROLOGIC ABO: CPT

## 2023-04-28 PROCEDURE — 93458 L HRT ARTERY/VENTRICLE ANGIO: CPT

## 2023-04-28 PROCEDURE — 93005 ELECTROCARDIOGRAM TRACING: CPT

## 2023-04-28 PROCEDURE — P9045: CPT

## 2023-04-28 PROCEDURE — 82553 CREATINE MB FRACTION: CPT

## 2023-04-28 PROCEDURE — 86850 RBC ANTIBODY SCREEN: CPT

## 2023-04-28 PROCEDURE — C1887: CPT

## 2023-04-28 PROCEDURE — 87040 BLOOD CULTURE FOR BACTERIA: CPT

## 2023-04-28 PROCEDURE — 82962 GLUCOSE BLOOD TEST: CPT

## 2023-04-28 PROCEDURE — C8929: CPT

## 2023-04-28 PROCEDURE — 93308 TTE F-UP OR LMTD: CPT

## 2023-04-28 PROCEDURE — 36415 COLL VENOUS BLD VENIPUNCTURE: CPT

## 2023-04-28 PROCEDURE — 99232 SBSQ HOSP IP/OBS MODERATE 35: CPT

## 2023-04-28 PROCEDURE — 93567 NJX CAR CTH SPRVLV AORTGRPHY: CPT

## 2023-04-28 PROCEDURE — C1889: CPT

## 2023-04-28 PROCEDURE — 84484 ASSAY OF TROPONIN QUANT: CPT

## 2023-04-28 PROCEDURE — 83735 ASSAY OF MAGNESIUM: CPT

## 2023-04-28 PROCEDURE — 80048 BASIC METABOLIC PNL TOTAL CA: CPT

## 2023-04-28 PROCEDURE — 85520 HEPARIN ASSAY: CPT

## 2023-04-28 PROCEDURE — 82550 ASSAY OF CK (CPK): CPT

## 2023-04-28 PROCEDURE — 86901 BLOOD TYPING SEROLOGIC RH(D): CPT

## 2023-04-28 PROCEDURE — 93880 EXTRACRANIAL BILAT STUDY: CPT

## 2023-04-28 PROCEDURE — 83036 HEMOGLOBIN GLYCOSYLATED A1C: CPT

## 2023-04-28 PROCEDURE — C1769: CPT

## 2023-04-28 PROCEDURE — 85610 PROTHROMBIN TIME: CPT

## 2023-04-28 PROCEDURE — C9606: CPT | Mod: LD,78

## 2023-04-28 PROCEDURE — 80053 COMPREHEN METABOLIC PANEL: CPT

## 2023-04-28 PROCEDURE — 71045 X-RAY EXAM CHEST 1 VIEW: CPT

## 2023-04-28 PROCEDURE — 99239 HOSP IP/OBS DSCHRG MGMT >30: CPT

## 2023-04-28 PROCEDURE — 80061 LIPID PANEL: CPT

## 2023-04-28 PROCEDURE — 85730 THROMBOPLASTIN TIME PARTIAL: CPT

## 2023-04-28 PROCEDURE — 84443 ASSAY THYROID STIM HORMONE: CPT

## 2023-04-28 PROCEDURE — 84100 ASSAY OF PHOSPHORUS: CPT

## 2023-04-28 PROCEDURE — 92978 ENDOLUMINL IVUS OCT C 1ST: CPT | Mod: LD,78

## 2023-04-28 PROCEDURE — 94010 BREATHING CAPACITY TEST: CPT

## 2023-04-28 PROCEDURE — 33967 INSERT I-AORT PERCUT DEVICE: CPT

## 2023-04-28 PROCEDURE — C1874: CPT

## 2023-04-28 PROCEDURE — C1894: CPT

## 2023-04-28 PROCEDURE — 73030 X-RAY EXAM OF SHOULDER: CPT

## 2023-04-28 PROCEDURE — 85027 COMPLETE CBC AUTOMATED: CPT

## 2023-04-28 PROCEDURE — C1725: CPT

## 2023-04-28 PROCEDURE — 97161 PT EVAL LOW COMPLEX 20 MIN: CPT

## 2023-04-28 PROCEDURE — 83605 ASSAY OF LACTIC ACID: CPT

## 2023-04-28 PROCEDURE — C1753: CPT

## 2023-04-28 RX ORDER — ISOPROPYL ALCOHOL, BENZOCAINE .7; .06 ML/ML; ML/ML
0 SWAB TOPICAL
Qty: 100 | Refills: 1
Start: 2023-04-28

## 2023-04-28 RX ORDER — CLOPIDOGREL BISULFATE 75 MG/1
1 TABLET, FILM COATED ORAL
Qty: 30 | Refills: 0
Start: 2023-04-28 | End: 2023-05-27

## 2023-04-28 RX ORDER — METFORMIN HYDROCHLORIDE 850 MG/1
1 TABLET ORAL
Refills: 0 | DISCHARGE

## 2023-04-28 RX ORDER — CAPTOPRIL 12.5 MG/1
0.5 TABLET ORAL
Qty: 15 | Refills: 0
Start: 2023-04-28 | End: 2023-05-27

## 2023-04-28 RX ORDER — METFORMIN HYDROCHLORIDE 850 MG/1
1 TABLET ORAL
Qty: 30 | Refills: 0
Start: 2023-04-28 | End: 2023-05-27

## 2023-04-28 RX ORDER — SIMVASTATIN 20 MG/1
1 TABLET, FILM COATED ORAL
Qty: 30 | Refills: 0
Start: 2023-04-28 | End: 2023-05-27

## 2023-04-28 RX ORDER — SIMVASTATIN 20 MG/1
1 TABLET, FILM COATED ORAL
Refills: 0 | DISCHARGE

## 2023-04-28 RX ORDER — METOPROLOL TARTRATE 50 MG
0.5 TABLET ORAL
Qty: 30 | Refills: 0
Start: 2023-04-28 | End: 2023-05-27

## 2023-04-28 RX ADMIN — Medication 81 MILLIGRAM(S): at 11:59

## 2023-04-28 RX ADMIN — Medication 12.5 MILLIGRAM(S): at 05:39

## 2023-04-28 RX ADMIN — HEPARIN SODIUM 5000 UNIT(S): 5000 INJECTION INTRAVENOUS; SUBCUTANEOUS at 05:39

## 2023-04-28 RX ADMIN — CAPTOPRIL 6.25 MILLIGRAM(S): 12.5 TABLET ORAL at 05:41

## 2023-04-28 RX ADMIN — Medication 4: at 09:06

## 2023-04-28 RX ADMIN — CLOPIDOGREL BISULFATE 75 MILLIGRAM(S): 75 TABLET, FILM COATED ORAL at 11:59

## 2023-04-28 NOTE — DISCHARGE NOTE NURSING/CASE MANAGEMENT/SOCIAL WORK - NSDCFUADDAPPT_GEN_ALL_CORE_FT
APPTS ARE READY TO BE MADE: [x] YES    Best Family or Patient Contact (if needed):    Additional Information about above appointments (if needed):    1: Please followup with cardiology in 1-2 weeks. you will need a staged PCI  2:   3:     Other comments or requests:

## 2023-04-28 NOTE — DISCHARGE NOTE NURSING/CASE MANAGEMENT/SOCIAL WORK - PATIENT PORTAL LINK FT
You can access the FollowMyHealth Patient Portal offered by [facility] by registering at the following website: [email or web address]. By joining [facility] FollowMyHealth portal, you will also be able to view your health information using other applications (apps) compatible with our system.

## 2023-04-28 NOTE — PHYSICAL THERAPY INITIAL EVALUATION ADULT - PERTINENT HX OF CURRENT PROBLEM, REHAB EVAL
62M PMH of CAD with 1 stent in 2013 to LAD and DM2 p/w intermittent CP alleviated by resting and sitting down since noon. States CP has been getting progressively worse around midnight which became persistent, patient took 2 baby ASA, and went to the ED when unable to sleep 2/2 pain. States the pain was 9/10, described as squeezing substernal CP.  Denies any SOB fever chills upper respiratory symptoms. In ED found with STEMI with KASEY V1,V2,V3 with +CE. ASA and Brilinta loaded, started on heparin gtt and transferred to [facility] for LHC. In Cath lab found with severe triple vessel disease (OM1, pLAD, and Cx disease) with vgram 20-25%. IABP placed. States now CP improved, mild chest pressure 1/10. Of note patient states has not seen physician in a few years. Recently stopped his simvastatin 1 week ago himself in which prior he was intermittently taking 20mg.

## 2023-04-28 NOTE — PROGRESS NOTE ADULT - SUBJECTIVE AND OBJECTIVE BOX
INTERVAL EVENTS/SUBJ:  No events     Home Medications:  Aspir 81 oral delayed release tablet: 1 orally 3 times a week (23 Apr 2023 06:10)  metFORMIN 500 mg oral tablet: 1 tab(s) orally was taking intermittently when eating poorly (25 Apr 2023 23:16)  simvastatin 20 mg oral tablet: 1 tab(s) orally was taking intermittently taking in the past, but self-discontinued 1 week prior (25 Apr 2023 23:16)      MEDICATIONS  (STANDING):  aspirin 81 milliGRAM(s) Oral daily  atorvastatin 80 milliGRAM(s) Oral at bedtime  captopril 6.25 milliGRAM(s) Oral daily  clopidogrel Tablet 75 milliGRAM(s) Oral daily  coronavirus bivalent (EUA) Booster Vaccine (PFIZER) 0.3 milliLiter(s) IntraMuscular once  heparin   Injectable 5000 Unit(s) SubCutaneous every 8 hours  insulin lispro (ADMELOG) corrective regimen sliding scale   SubCutaneous at bedtime  insulin lispro (ADMELOG) corrective regimen sliding scale   SubCutaneous three times a day before meals  metoprolol tartrate 12.5 milliGRAM(s) Oral two times a day    MEDICATIONS  (PRN):  aluminum hydroxide/magnesium hydroxide/simethicone Suspension 30 milliLiter(s) Oral once PRN Dyspepsia      Vital Signs Last 24 Hrs  T(C): 36.9 (28 Apr 2023 05:03), Max: 38.9 (27 Apr 2023 20:48)  T(F): 98.5 (28 Apr 2023 05:03), Max: 102 (27 Apr 2023 20:48)  HR: 78 (28 Apr 2023 05:03) (78 - 94)  BP: 100/63 (28 Apr 2023 05:03) (93/54 - 121/74)  BP(mean): 80 (27 Apr 2023 11:15) (80 - 80)  RR: 18 (28 Apr 2023 05:03) (18 - 20)  SpO2: 95% (28 Apr 2023 05:03) (92% - 96%)    Parameters below as of 28 Apr 2023 05:03  Patient On (Oxygen Delivery Method): room air        REVIEW OF SYSTEMS:  As per HPI, otherwise unremarkable.     PHYSICAL EXAM:  Constitutional/Appearance: Normal, Well-developed  HEENT:   Normal oral mucosa, no drainage or redness, supple neck  Lymphatic: No lymphadenopathy  Cardiovascular: Normal S1 S2, No edema, II/VI SCOTT  Respiratory: Lungs clear to auscultation, respirations non-labored  Psychiatry: A & O x 3, appropriate affect.   Gastrointestinal:  Soft, Non-tender, no distention  Skin: No rashes, No ecchymoses, No cyanosis	  Neurologic: Non-focal, Alert and oriented x 3  Extremities: Normal range of motion  Vascular: Peripheral pulses palpable 2+ bilaterally (radial)    LABS:  CBC Full  -  ( 28 Apr 2023 07:08 )  WBC Count : 12.30 K/uL  RBC Count : 4.06 M/uL  Hemoglobin : 12.3 g/dL  Hematocrit : 36.3 %  Platelet Count - Automated : 302 K/uL  Mean Cell Volume : 89.4 fl  Mean Cell Hemoglobin : 30.3 pg  Mean Cell Hemoglobin Concentration : 33.9 gm/dL  Auto Neutrophil # : x  Auto Lymphocyte # : x  Auto Monocyte # : x  Auto Eosinophil # : x  Auto Basophil # : x  Auto Neutrophil % : x  Auto Lymphocyte % : x  Auto Monocyte % : x  Auto Eosinophil % : x  Auto Basophil % : x      04-28    134<L>  |  101  |  15  ----------------------------<  163<H>  3.7   |  18<L>  |  0.80    Ca    9.1      28 Apr 2023 07:07        CARDIAC MARKERS ( 26 Apr 2023 10:47 )  x     / x     / 302 U/L / x     / 3.3 ng/mL      IMPRESSION AND PLAN: 62M w CAD s/p PCI 2013, DM2, tobacco use here w CP and STEMI. Now s/p YADIRA to pLAD with residual LCx and OM disease; CABG declined. Newly reduced EF to 33%. Course c/b post-infarct pericarditis resolving.  -tele  -cont DAPT, statin  -cont captopril  -cont metoprolol, slow uptitration given BP  -rpt TTE   -outpt staged PCI    ***    Farrukh Riggs MD, MPhil, [facility]  Cardiologist, [facility]  ; [employer] at [facility]  email: [email or web address] on-service contact/call information, go to [email or web address] and use "[email or web address]" to login.  Outpatient Cardiology appointments, call  [phone] to arrange with a colleague; I do not have outpatient Cardiology clinic.

## 2023-04-28 NOTE — PROGRESS NOTE ADULT - PROVIDER SPECIALTY LIST ADULT
Intervent Cardiology
Intervent Cardiology
Cardiology
CADEN
Cardiology
CADEN
Cardiology
Internal Medicine

## 2023-04-28 NOTE — PHYSICAL THERAPY INITIAL EVALUATION ADULT - ADDITIONAL COMMENTS
Pt lives with his wife and children in a private house with 6 steps to enter, + handrail and another 6 steps to bedroom, with + handrail. Pt was independent PTA.

## 2023-04-28 NOTE — DISCHARGE NOTE NURSING/CASE MANAGEMENT/SOCIAL WORK - NSDCPEFALRISK_GEN_ALL_CORE
For information on Fall & Injury Prevention, visit: [email or web address] OR  [email or web address] OR  https://www.cdc.gov/steadi/patient.html

## 2023-05-01 PROBLEM — E11.9 TYPE 2 DIABETES MELLITUS WITHOUT COMPLICATIONS: Chronic | Status: ACTIVE | Noted: 2023-04-23

## 2023-05-01 PROBLEM — Z00.00 ENCOUNTER FOR PREVENTIVE HEALTH EXAMINATION: Status: ACTIVE | Noted: 2023-05-01

## 2023-05-01 PROBLEM — I25.10 ATHEROSCLEROTIC HEART DISEASE OF NATIVE CORONARY ARTERY WITHOUT ANGINA PECTORIS: Chronic | Status: ACTIVE | Noted: 2023-04-23

## 2023-05-03 LAB
CULTURE RESULTS: SIGNIFICANT CHANGE UP
CULTURE RESULTS: SIGNIFICANT CHANGE UP
SPECIMEN SOURCE: SIGNIFICANT CHANGE UP
SPECIMEN SOURCE: SIGNIFICANT CHANGE UP

## 2023-05-04 ENCOUNTER — APPOINTMENT (OUTPATIENT)
Dept: CARDIOLOGY | Facility: HOSPITAL | Age: 63
End: 2023-05-04

## 2023-05-04 ENCOUNTER — NON-APPOINTMENT (OUTPATIENT)
Age: 63
End: 2023-05-04

## 2023-05-04 ENCOUNTER — OUTPATIENT (OUTPATIENT)
Dept: OUTPATIENT SERVICES | Facility: HOSPITAL | Age: 63
LOS: 1 days | End: 2023-05-04
Payer: SELF-PAY

## 2023-05-04 VITALS
BODY MASS INDEX: 20.89 KG/M2 | SYSTOLIC BLOOD PRESSURE: 108 MMHG | HEIGHT: 66 IN | HEART RATE: 65 BPM | OXYGEN SATURATION: 99 % | DIASTOLIC BLOOD PRESSURE: 70 MMHG | WEIGHT: 130 LBS

## 2023-05-04 DIAGNOSIS — Z86.79 PERSONAL HISTORY OF OTHER DISEASES OF THE CIRCULATORY SYSTEM: ICD-10-CM

## 2023-05-04 PROCEDURE — 93005 ELECTROCARDIOGRAM TRACING: CPT

## 2023-05-04 PROCEDURE — G0463: CPT

## 2023-05-05 RX ORDER — ATORVASTATIN CALCIUM 80 MG/1
80 TABLET, FILM COATED ORAL DAILY
Qty: 90 | Refills: 0 | Status: DISCONTINUED | COMMUNITY
Start: 2023-05-04 | End: 2023-05-05

## 2023-05-08 LAB
ALBUMIN SERPL ELPH-MCNC: 4.3 G/DL
ALP BLD-CCNC: 230 U/L
ALT SERPL-CCNC: 35 U/L
ANION GAP SERPL CALC-SCNC: 15 MMOL/L
AST SERPL-CCNC: 37 U/L
BILIRUB SERPL-MCNC: 0.3 MG/DL
BUN SERPL-MCNC: 12 MG/DL
CALCIUM SERPL-MCNC: 9.8 MG/DL
CHLORIDE SERPL-SCNC: 98 MMOL/L
CO2 SERPL-SCNC: 24 MMOL/L
CREAT SERPL-MCNC: 0.83 MG/DL
EGFR: 100 ML/MIN/1.73M2
GLUCOSE SERPL-MCNC: 114 MG/DL
HCT VFR BLD CALC: 36.6 %
HGB BLD-MCNC: 12.2 G/DL
MCHC RBC-ENTMCNC: 30.2 PG
MCHC RBC-ENTMCNC: 33.3 GM/DL
MCV RBC AUTO: 90.6 FL
PLATELET # BLD AUTO: 560 K/UL
POTASSIUM SERPL-SCNC: 5.1 MMOL/L
PROT SERPL-MCNC: 8 G/DL
RBC # BLD: 4.04 M/UL
RBC # FLD: 12.7 %
SODIUM SERPL-SCNC: 137 MMOL/L
WBC # FLD AUTO: 12.66 K/UL

## 2023-05-08 RX ORDER — ROSUVASTATIN CALCIUM 40 MG/1
40 TABLET, FILM COATED ORAL
Qty: 30 | Refills: 5 | Status: DISCONTINUED | COMMUNITY
Start: 2023-05-05 | End: 2023-05-08

## 2023-05-10 DIAGNOSIS — I25.10 ATHEROSCLEROTIC HEART DISEASE OF NATIVE CORONARY ARTERY WITHOUT ANGINA PECTORIS: ICD-10-CM

## 2023-05-11 ENCOUNTER — APPOINTMENT (OUTPATIENT)
Dept: CARDIOLOGY | Facility: CLINIC | Age: 63
End: 2023-05-11
Payer: MEDICAID

## 2023-05-11 PROBLEM — Z86.79 HISTORY OF MYOCARDIAL ISCHEMIA: Status: RESOLVED | Noted: 2023-05-11 | Resolved: 2023-05-11

## 2023-05-11 PROCEDURE — 93784 AMBL BP MNTR W/SOFTWARE: CPT

## 2023-05-11 NOTE — DISCUSSION/SUMMARY
[EKG obtained to assist in diagnosis and management of assessed problem(s)] : EKG obtained to assist in diagnosis and management of assessed problem(s) [FreeTextEntry1] : Plan:\par -Cardiac rehab\par -BP monitoring\par -Switch to high intensity statin\par -Up-titrate BB, patient educated on how to obtain BP with cuff, assess for symptoms/SBP <90 \par -Staged PCI in 1-2 weeks\par -PCP follow up\par -Follow up with me shortly after stent placement \par

## 2023-05-11 NOTE — HISTORY OF PRESENT ILLNESS
[FreeTextEntry1] : 62M w CAD s/p PCI 2013 (stent to LAD), DM2, tobacco use, with recent presentation of STEMI. Now s/p YADIRA to LAD x2 4/23/23 with residual LCx and OM disease; (MVD CABG declined). Newly reduced EF to 33%. Patient required IABP during hospitalization which was removed 4/25. Had labile blood pressures\par \par Today reports no dyspnea, no chest pain, repots generalized fatigue but has not exerted himself to assess for anginal symptoms. No PND, edema, or orthopnea. No dizziness or palpitations. Has stopped smoking. \par \par \par BP today 108/70\par HR 65 \par \par Current Medications: \par Metformin 500 mg qd\par Clopidogrel 75 mg qd\par Metoprolol 0.5 BID\par Simvastatin 20 mg qd \par Captopril (patient did not bring in this med, unclear dose) \par \par

## 2023-05-11 NOTE — CARDIOLOGY SUMMARY
[de-identified] : 5/4/023: SR, PRWP, low voltage in my limb leads  [de-identified] : 4/23/23:  \par  1. The left ventricular systolic function is severely decreased with an ejection fraction of 33 % by Severino's methodof disks. No evidence of a thrombus in the left ventricle.\par  2. Multiple segmental abnormalities exist. See findings.\par  3. Normal right ventricular cavity size, normal wall thickness and normal systolic function.\par  4. Trace mitral regurgitation.\par  5.No evidence of aortic regurgitation.\par  6. No prior echocardiogram is available for comparison.\par \par ECHO 4/26 with trivial effusion  [de-identified] : Coronary Angiography \par LM \par Left main artery: The segment is visually normal in size and\par structure.\par \par LAD \par Proximal left anterior descending: There is a 90 % stenosis in the\par proximal third portion of the segment. Midleft anterior\par descending: There is a 20 % in-stent restenosis in the middle third\par portion of the segment. Mid left anterior descending: There\par \par \par Conclusions: \par \par Status post angioplasty, IVUS and stenting (drug-eluting/Medtronic\par [place]) of the left anterior descending artery with resultant\par RAINE 3 flow \par \par is a 70 % stenosis in the distal third portion of the segment. First\par diagonal: There is a 20 % stenosis.\par \par CX \par Proximal circumflex: There is a 70 % stenosis in the distal third\par portion of the segment. First obtuse marginal: There is an 80\par % stenosis in the ostium portion of the segment.  \par \par RCA \par Proximal right coronary artery: The segment is visually normal in size\par and structure.\par

## 2023-05-12 DIAGNOSIS — I50.20 UNSPECIFIED SYSTOLIC (CONGESTIVE) HEART FAILURE: ICD-10-CM

## 2023-05-22 ENCOUNTER — NON-APPOINTMENT (OUTPATIENT)
Age: 63
End: 2023-05-22

## 2023-05-24 ENCOUNTER — TRANSCRIPTION ENCOUNTER (OUTPATIENT)
Age: 63
End: 2023-05-24

## 2023-05-24 ENCOUNTER — OUTPATIENT (OUTPATIENT)
Dept: OUTPATIENT SERVICES | Facility: HOSPITAL | Age: 63
LOS: 1 days | End: 2023-05-24
Payer: SELF-PAY

## 2023-05-24 VITALS
SYSTOLIC BLOOD PRESSURE: 115 MMHG | RESPIRATION RATE: 16 BRPM | DIASTOLIC BLOOD PRESSURE: 70 MMHG | HEART RATE: 73 BPM | OXYGEN SATURATION: 97 %

## 2023-05-24 VITALS
RESPIRATION RATE: 16 BRPM | HEART RATE: 71 BPM | SYSTOLIC BLOOD PRESSURE: 130 MMHG | HEIGHT: 66 IN | DIASTOLIC BLOOD PRESSURE: 70 MMHG | TEMPERATURE: 98 F | OXYGEN SATURATION: 100 % | WEIGHT: 130.07 LBS

## 2023-05-24 DIAGNOSIS — I25.10 ATHEROSCLEROTIC HEART DISEASE OF NATIVE CORONARY ARTERY WITHOUT ANGINA PECTORIS: ICD-10-CM

## 2023-05-24 LAB
ANION GAP SERPL CALC-SCNC: 14 MMOL/L — SIGNIFICANT CHANGE UP (ref 5–17)
BUN SERPL-MCNC: 12 MG/DL — SIGNIFICANT CHANGE UP (ref 7–23)
CALCIUM SERPL-MCNC: 9.9 MG/DL — SIGNIFICANT CHANGE UP (ref 8.4–10.5)
CHLORIDE SERPL-SCNC: 102 MMOL/L — SIGNIFICANT CHANGE UP (ref 96–108)
CO2 SERPL-SCNC: 23 MMOL/L — SIGNIFICANT CHANGE UP (ref 22–31)
CREAT SERPL-MCNC: 0.9 MG/DL — SIGNIFICANT CHANGE UP (ref 0.5–1.3)
EGFR: 98 ML/MIN/1.73M2 — SIGNIFICANT CHANGE UP
GLUCOSE BLDC GLUCOMTR-MCNC: 140 MG/DL — HIGH (ref 70–99)
GLUCOSE SERPL-MCNC: 140 MG/DL — HIGH (ref 70–99)
HCT VFR BLD CALC: 38.8 % — LOW (ref 39–50)
HGB BLD-MCNC: 12.9 G/DL — LOW (ref 13–17)
MCHC RBC-ENTMCNC: 30.1 PG — SIGNIFICANT CHANGE UP (ref 27–34)
MCHC RBC-ENTMCNC: 33.2 GM/DL — SIGNIFICANT CHANGE UP (ref 32–36)
MCV RBC AUTO: 90.4 FL — SIGNIFICANT CHANGE UP (ref 80–100)
NRBC # BLD: 0 /100 WBCS — SIGNIFICANT CHANGE UP (ref 0–0)
PLATELET # BLD AUTO: 228 K/UL — SIGNIFICANT CHANGE UP (ref 150–400)
POTASSIUM SERPL-MCNC: 3.7 MMOL/L — SIGNIFICANT CHANGE UP (ref 3.5–5.3)
POTASSIUM SERPL-SCNC: 3.7 MMOL/L — SIGNIFICANT CHANGE UP (ref 3.5–5.3)
RBC # BLD: 4.29 M/UL — SIGNIFICANT CHANGE UP (ref 4.2–5.8)
RBC # FLD: 12.8 % — SIGNIFICANT CHANGE UP (ref 10.3–14.5)
SODIUM SERPL-SCNC: 139 MMOL/L — SIGNIFICANT CHANGE UP (ref 135–145)
WBC # BLD: 10.06 K/UL — SIGNIFICANT CHANGE UP (ref 3.8–10.5)
WBC # FLD AUTO: 10.06 K/UL — SIGNIFICANT CHANGE UP (ref 3.8–10.5)

## 2023-05-24 PROCEDURE — C1887: CPT

## 2023-05-24 PROCEDURE — C1894: CPT

## 2023-05-24 PROCEDURE — 93571 IV DOP VEL&/PRESS C FLO 1ST: CPT | Mod: 26,LC

## 2023-05-24 PROCEDURE — 99152 MOD SED SAME PHYS/QHP 5/>YRS: CPT

## 2023-05-24 PROCEDURE — 80048 BASIC METABOLIC PNL TOTAL CA: CPT

## 2023-05-24 PROCEDURE — 93799 UNLISTED CV SVC/PROCEDURE: CPT | Mod: LC

## 2023-05-24 PROCEDURE — 93010 ELECTROCARDIOGRAM REPORT: CPT

## 2023-05-24 PROCEDURE — 82962 GLUCOSE BLOOD TEST: CPT

## 2023-05-24 PROCEDURE — 36415 COLL VENOUS BLD VENIPUNCTURE: CPT

## 2023-05-24 PROCEDURE — 93005 ELECTROCARDIOGRAM TRACING: CPT

## 2023-05-24 PROCEDURE — 93458 L HRT ARTERY/VENTRICLE ANGIO: CPT

## 2023-05-24 PROCEDURE — 85027 COMPLETE CBC AUTOMATED: CPT

## 2023-05-24 PROCEDURE — 93458 L HRT ARTERY/VENTRICLE ANGIO: CPT | Mod: 26

## 2023-05-24 PROCEDURE — C1769: CPT

## 2023-05-24 RX ORDER — ASPIRIN/CALCIUM CARB/MAGNESIUM 324 MG
1 TABLET ORAL
Refills: 0 | DISCHARGE

## 2023-05-24 RX ORDER — METOPROLOL TARTRATE 50 MG
0.5 TABLET ORAL
Refills: 0 | DISCHARGE

## 2023-05-24 RX ORDER — ATORVASTATIN CALCIUM 80 MG/1
1 TABLET, FILM COATED ORAL
Refills: 0 | DISCHARGE

## 2023-05-24 RX ORDER — METFORMIN HYDROCHLORIDE 850 MG/1
1 TABLET ORAL
Qty: 30 | Refills: 0
Start: 2023-05-24 | End: 2023-06-22

## 2023-05-24 RX ORDER — CLOPIDOGREL BISULFATE 75 MG/1
75 TABLET, FILM COATED ORAL ONCE
Refills: 0 | Status: COMPLETED | OUTPATIENT
Start: 2023-05-24 | End: 2023-05-24

## 2023-05-24 RX ORDER — CLOPIDOGREL BISULFATE 75 MG/1
1 TABLET, FILM COATED ORAL
Qty: 90 | Refills: 3
Start: 2023-05-24 | End: 2024-05-17

## 2023-05-24 RX ORDER — CLOPIDOGREL BISULFATE 75 MG/1
1 TABLET, FILM COATED ORAL
Qty: 30 | Refills: 0
Start: 2023-05-24 | End: 2023-06-22

## 2023-05-24 RX ORDER — ASPIRIN/CALCIUM CARB/MAGNESIUM 324 MG
81 TABLET ORAL DAILY
Refills: 0 | Status: DISCONTINUED | OUTPATIENT
Start: 2023-05-24 | End: 2023-06-07

## 2023-05-24 RX ADMIN — Medication 81 MILLIGRAM(S): at 08:31

## 2023-05-24 RX ADMIN — CLOPIDOGREL BISULFATE 75 MILLIGRAM(S): 75 TABLET, FILM COATED ORAL at 08:26

## 2023-05-24 NOTE — ASU DISCHARGE PLAN (ADULT/PEDIATRIC) - PROVIDER TOKENS
FREE:[LAST:[CARDIOLOGY CLINIC],PHONE:[phone]],FAX:[(   [phone]    -],ADDRESS:[[address] [place] [address]],FOLLOWUP:[2 weeks]]

## 2023-05-24 NOTE — ASU PATIENT PROFILE, ADULT - FALL HARM RISK - UNIVERSAL INTERVENTIONS
Bed in lowest position, wheels locked, appropriate side rails in place/Call bell, personal items and telephone in reach/Instruct patient to call for assistance before getting out of bed or chair/Non-slip footwear when patient is out of bed/[place] to call system/Physically safe environment - no spills, clutter or unnecessary equipment/Purposeful Proactive Rounding/Room/bathroom lighting operational, light cord in reach

## 2023-05-24 NOTE — ASU DISCHARGE PLAN (ADULT/PEDIATRIC) - CARE PROVIDER_API CALL
CARDIOLOGY CLINIC,   [address] [place] [address]  Phone: [phone]  Fax: (   )    -  Follow Up Time: 2 weeks

## 2023-05-24 NOTE — H&P CARDIOLOGY - NSICDXPASTMEDICALHX_GEN_ALL_CORE_FT
PAST MEDICAL HISTORY:  CAD (coronary artery disease)     Diabetes     HFrEF (heart failure with reduced ejection fraction)     STEMI (ST elevation myocardial infarction)

## 2023-05-24 NOTE — H&P CARDIOLOGY - HISTORY OF PRESENT ILLNESS
62M w CAD s/p PCI 2013, DM2, tobacco use here w CP and STEMI. Now s/p YADIRA to pLAD with residual LCx and OM disease; CABG declined. Newly reduced EF to 33%. Course c/b post-infarct pericarditis resolving.  -tele  -cont DAPT, statin  -cont captopril  -cont metoprolol, slow uptitration given BP  -rpt TTE   -outpt staged PCI  62M w CAD s/p PCI 2013, DM2, tobacco, STEMI 4/2023 s/p YADIRA to pLAD 90% 4/23/23 requiring IABP (d/c'd 4/25/23) with residual LCx 70% and OM 80% disease; CABG declined. Newly reduced EF to 33%. Course c/b post-infarct pericarditis who presents today for staged intervention.

## 2023-06-15 ENCOUNTER — APPOINTMENT (OUTPATIENT)
Dept: CARDIOLOGY | Facility: HOSPITAL | Age: 63
End: 2023-06-15

## 2023-06-15 ENCOUNTER — OUTPATIENT (OUTPATIENT)
Dept: OUTPATIENT SERVICES | Facility: HOSPITAL | Age: 63
LOS: 1 days | End: 2023-06-15
Payer: SELF-PAY

## 2023-06-15 ENCOUNTER — NON-APPOINTMENT (OUTPATIENT)
Age: 63
End: 2023-06-15

## 2023-06-15 VITALS
SYSTOLIC BLOOD PRESSURE: 111 MMHG | HEIGHT: 66 IN | DIASTOLIC BLOOD PRESSURE: 64 MMHG | WEIGHT: 130 LBS | HEART RATE: 72 BPM | BODY MASS INDEX: 20.89 KG/M2 | OXYGEN SATURATION: 99 %

## 2023-06-15 DIAGNOSIS — I25.10 ATHEROSCLEROTIC HEART DISEASE OF NATIVE CORONARY ARTERY WITHOUT ANGINA PECTORIS: ICD-10-CM

## 2023-06-15 PROBLEM — I21.3 ST ELEVATION (STEMI) MYOCARDIAL INFARCTION OF UNSPECIFIED SITE: Chronic | Status: ACTIVE | Noted: 2023-05-24

## 2023-06-15 PROBLEM — I50.20 UNSPECIFIED SYSTOLIC (CONGESTIVE) HEART FAILURE: Chronic | Status: ACTIVE | Noted: 2023-05-24

## 2023-06-15 PROCEDURE — G0463: CPT

## 2023-06-15 PROCEDURE — 93005 ELECTROCARDIOGRAM TRACING: CPT

## 2023-06-27 DIAGNOSIS — I50.20 UNSPECIFIED SYSTOLIC (CONGESTIVE) HEART FAILURE: ICD-10-CM

## 2023-06-29 ENCOUNTER — OUTPATIENT (OUTPATIENT)
Dept: OUTPATIENT SERVICES | Facility: HOSPITAL | Age: 63
LOS: 1 days | End: 2023-06-29
Payer: SELF-PAY

## 2023-06-29 ENCOUNTER — NON-APPOINTMENT (OUTPATIENT)
Age: 63
End: 2023-06-29

## 2023-06-29 ENCOUNTER — APPOINTMENT (OUTPATIENT)
Dept: CARDIOLOGY | Facility: HOSPITAL | Age: 63
End: 2023-06-29

## 2023-06-29 VITALS — SYSTOLIC BLOOD PRESSURE: 117 MMHG | OXYGEN SATURATION: 98 % | HEART RATE: 74 BPM | DIASTOLIC BLOOD PRESSURE: 75 MMHG

## 2023-06-29 DIAGNOSIS — I25.10 ATHEROSCLEROTIC HEART DISEASE OF NATIVE CORONARY ARTERY WITHOUT ANGINA PECTORIS: ICD-10-CM

## 2023-06-29 DIAGNOSIS — M25.512 PAIN IN LEFT SHOULDER: ICD-10-CM

## 2023-06-29 DIAGNOSIS — G89.29 PAIN IN LEFT SHOULDER: ICD-10-CM

## 2023-06-29 LAB
ALBUMIN SERPL ELPH-MCNC: 4.5 G/DL
ALP BLD-CCNC: 115 U/L
ALT SERPL-CCNC: 18 U/L
ANION GAP SERPL CALC-SCNC: 13 MMOL/L
AST SERPL-CCNC: 26 U/L
BILIRUB SERPL-MCNC: 0.2 MG/DL
BUN SERPL-MCNC: 11 MG/DL
CALCIUM SERPL-MCNC: 9.7 MG/DL
CHLORIDE SERPL-SCNC: 105 MMOL/L
CK SERPL-CCNC: 117 U/L
CO2 SERPL-SCNC: 21 MMOL/L
CREAT SERPL-MCNC: 0.89 MG/DL
EGFR: 98 ML/MIN/1.73M2
GLUCOSE SERPL-MCNC: 166 MG/DL
POTASSIUM SERPL-SCNC: 4.2 MMOL/L
PROT SERPL-MCNC: 7.5 G/DL
SODIUM SERPL-SCNC: 140 MMOL/L

## 2023-06-29 PROCEDURE — G0463: CPT

## 2023-06-29 PROCEDURE — 93005 ELECTROCARDIOGRAM TRACING: CPT

## 2023-06-29 RX ORDER — LIDOCAINE 4% 4 G/100G
4 PATCH TOPICAL
Qty: 6 | Refills: 0 | Status: ACTIVE | COMMUNITY
Start: 2023-06-29 | End: 1900-01-01

## 2023-06-29 NOTE — CARDIOLOGY SUMMARY
[de-identified] : 5/4/023: SR, PRWP, low voltage in my limb leads\par 6/15/2023: SR, PRWP, Low voltage in limb leads \par 6/29/2023: SR, PRWP, TW abl  [de-identified] : 4/23/23: \par  1. The left ventricular systolic function is severely decreased with an ejection fraction of 33 % by Severino's methodof disks. No evidence of a thrombus in the left ventricle.\par  2. Multiple segmental abnormalities exist. See findings.\par  3. Normal right ventricular cavity size, normal wall thickness and normal systolic function.\par  4. Trace mitral regurgitation.\par  5.No evidence of aortic regurgitation.\par  6. No prior echocardiogram is available for comparison.\par \par ECHO 4/26 with trivial effusio \par

## 2023-06-29 NOTE — HISTORY OF PRESENT ILLNESS
[FreeTextEntry1] : 62M w CAD s/p PCI 2013 (stent to LAD), DM2, tobacco use, with recent presentation of STEMI. Now s/p YADIRA to LAD x2 4/23/23 with residual LCx and OM disease; (MVD CABG declined). HFrEF newly reduced EF to 33%. Patient required IABP during hospitalization which was removed 4/25. Had labile blood pressures. Obtained BP monitor. He had short term follow up with me to optimize his GDMT, he was seen 6/15 where he noted to not experience chest pain, dizziness, palpitations, headaches, paroxysmal nocturnal dyspnea (PND), or orthopnea. They are capable of achieving a workload exceeding 4 metabolic equivalents (Mets) and are close to their normal baseline. He also had an AMBU BP monitor noted below. He was started on an ARB and his metoprolol was increased with lab work showing now worsening in creatine function. He did note since starting atorvastatin he had a period of acheness which improved and his CK was WNL \par \par Today: \par Reports Bps 110s/70s at home, tolerating medications well. Reports ET of ~1 mile walking at low-mod intensity pace. No dizziness, chest pain, dyspnea at rest or with exertion, orthopnea, PND, DARIEN, palpitations. He was educated on diet for for cardiac disease, diabetes, and further educated on a exercise regimen. \par \par \par Current Medications: \par Metformin 500 mg qd\par Clopidogrel 75 mg qd\par ASA 81 qd \par Metoprolol 12. qd \par Atorvastatin 80 mg qhs \par \par Cath data:\par Coronary Angiography \par LM \par Left main artery: The segment is visually normal in size and\par structure.\par \par LAD \par Proximal left anterior descending: There is a 90 % stenosis in the\par proximal third portion of the segment. Midleft anterior\par descending: There is a 20 % in-stent restenosis in the middle third\par portion of the segment. Mid left anterior descending: There\par \par \par Conclusions: \par \par Status post angioplasty, IVUS and stenting (drug-eluting/Medtronic\par [place]) of the left anterior descending artery with resultant\par RAINE 3 flow \par \par is a 70 % stenosis in the distal third portion of the segment. First\par diagonal: There is a 20 % stenosis.\par \par CX \par Proximal circumflex: There is a 70 % stenosis in the distal third\par portion of the segment. First obtuse marginal: There is an 80\par % stenosis in the ostium portion of the segment. \par \par RCA \par Proximal right coronary artery: The segment is visually normal in size\par and structure.\par  \par 5/24/23\par Patent stent in the left anterior descending artery with no in-stent\par restenosis with RAINE 3 flow\par \par Status post IFR of the 1st obtuse marginal artery. The IFR was 0.96.\par Findings suggestive of hemodynamically non-obstructive\par coronary artery disease \par \par Status post IFR of the left circumflex artery. The IFR was 1.00.\par Findings suggestive of hemodynamically non-obstructive\par coronary artery diseas

## 2023-06-30 DIAGNOSIS — I50.20 UNSPECIFIED SYSTOLIC (CONGESTIVE) HEART FAILURE: ICD-10-CM

## 2023-06-30 DIAGNOSIS — M25.512 PAIN IN LEFT SHOULDER: ICD-10-CM

## 2023-06-30 NOTE — CARDIOLOGY SUMMARY
[de-identified] : 5/4/023: SR, PRWP, low voltage in my limb leads\par 6/15/2023: SR, PRWP, Low voltage in limb leads  [de-identified] : 4/23/23: \par  1. The left ventricular systolic function is severely decreased with an ejection fraction of 33 % by Severino's methodof disks. No evidence of a thrombus in the left ventricle.\par  2. Multiple segmental abnormalities exist. See findings.\par  3. Normal right ventricular cavity size, normal wall thickness and normal systolic function.\par  4. Trace mitral regurgitation.\par  5.No evidence of aortic regurgitation.\par  6. No prior echocardiogram is available for comparison.\par \par ECHO 4/26 with trivial effusio

## 2023-06-30 NOTE — HISTORY OF PRESENT ILLNESS
[FreeTextEntry1] : 62M w CAD s/p PCI 2013 (stent to LAD), DM2, tobacco use, with recent presentation of STEMI. Now s/p YADIRA to LAD x2 4/23/23 with residual LCx and OM disease; (MVD CABG declined). Newly reduced EF to 33%. Patient required IABP during hospitalization which was removed 4/25. Had labile blood pressures. Obtained BP monitor.\par \par The patient does not experience chest pain, dizziness, palpitations, headaches, paroxysmal nocturnal dyspnea (PND), or orthopnea. They are capable of achieving a workload exceeding 4 metabolic equivalents (Mets) and are close to their normal baseline. There are no indications of heart failure (HF), arrhythmia symptoms, or chest pain. The patient is presently on dual antiplatelet therapy (DAPT). However, they have depleted their supply of ACE inhibitors and will switch to a 25 mg dose of Losartan.\par \par \par Ambu BP monitor: \par Awake BP average 101/72 \par Minimum 77/45, Maximum 132/84 \par Average Asleep 101/69 \par \par BP today 111/64\par \par Current Medications: \par Metformin 500 mg qd\par Clopidogrel 75 mg qd\par ASA 81 qd \par Metoprolol 12. qd \par Atorvastatin 80 mg qhs \par \par Cath data:\par Coronary Angiography \par LM \par Left main artery: The segment is visually normal in size and\par structure.\par \par LAD \par Proximal left anterior descending: There is a 90 % stenosis in the\par proximal third portion of the segment. Midleft anterior\par descending: There is a 20 % in-stent restenosis in the middle third\par portion of the segment. Mid left anterior descending: There\par \par \par Conclusions: \par \par Status post angioplasty, IVUS and stenting (drug-eluting/Medtronic\par [place]) of the left anterior descending artery with resultant\par RAINE 3 flow \par \par is a 70 % stenosis in the distal third portion of the segment. First\par diagonal: There is a 20 % stenosis.\par \par CX \par Proximal circumflex: There is a 70 % stenosis in the distal third\par portion of the segment. First obtuse marginal: There is an 80\par % stenosis in the ostium portion of the segment. \par \par RCA \par Proximal right coronary artery: The segment is visually normal in size\par and structure.\par  \par 5/24/23\par Patent stent in the left anterior descending artery with no in-stent\par restenosis with RAINE 3 flow\par \par Status post IFR of the 1st obtuse marginal artery.  The IFR was 0.96.\par Findings suggestive of hemodynamically non-obstructive\par coronary artery disease \par \par Status post IFR of the left circumflex artery.  The IFR was 1.00.\par Findings suggestive of hemodynamically non-obstructive\par coronary artery disease

## 2023-08-01 ENCOUNTER — OUTPATIENT (OUTPATIENT)
Dept: OUTPATIENT SERVICES | Facility: HOSPITAL | Age: 63
LOS: 1 days | End: 2023-08-01
Payer: SELF-PAY

## 2023-08-01 ENCOUNTER — APPOINTMENT (OUTPATIENT)
Dept: CV DIAGNOSITCS | Facility: HOSPITAL | Age: 63
End: 2023-08-01

## 2023-08-01 DIAGNOSIS — I50.20 UNSPECIFIED SYSTOLIC (CONGESTIVE) HEART FAILURE: ICD-10-CM

## 2023-08-01 DIAGNOSIS — I25.10 ATHEROSCLEROTIC HEART DISEASE OF NATIVE CORONARY ARTERY WITHOUT ANGINA PECTORIS: ICD-10-CM

## 2023-08-01 DIAGNOSIS — M25.512 PAIN IN LEFT SHOULDER: ICD-10-CM

## 2023-08-01 PROCEDURE — 93306 TTE W/DOPPLER COMPLETE: CPT

## 2023-08-01 PROCEDURE — 93356 MYOCRD STRAIN IMG SPCKL TRCK: CPT

## 2023-08-01 PROCEDURE — 93306 TTE W/DOPPLER COMPLETE: CPT | Mod: 26

## 2023-08-03 ENCOUNTER — OUTPATIENT (OUTPATIENT)
Dept: OUTPATIENT SERVICES | Facility: HOSPITAL | Age: 63
LOS: 1 days | End: 2023-08-03
Payer: SELF-PAY

## 2023-08-03 ENCOUNTER — APPOINTMENT (OUTPATIENT)
Dept: CARDIOLOGY | Facility: HOSPITAL | Age: 63
End: 2023-08-03

## 2023-08-03 VITALS
HEART RATE: 79 BPM | WEIGHT: 130 LBS | BODY MASS INDEX: 20.89 KG/M2 | HEIGHT: 66 IN | DIASTOLIC BLOOD PRESSURE: 73 MMHG | SYSTOLIC BLOOD PRESSURE: 112 MMHG | OXYGEN SATURATION: 97 %

## 2023-08-03 DIAGNOSIS — I25.10 ATHEROSCLEROTIC HEART DISEASE OF NATIVE CORONARY ARTERY WITHOUT ANGINA PECTORIS: ICD-10-CM

## 2023-08-03 PROCEDURE — 93005 ELECTROCARDIOGRAM TRACING: CPT

## 2023-08-03 PROCEDURE — G0463: CPT

## 2023-08-10 NOTE — HISTORY OF PRESENT ILLNESS
[FreeTextEntry1] : 62M w CAD s/p PCI 2013 (stent to LAD), DM2, tobacco use, with recent presentation of STEMI. Now s/p YADIRA to LAD x2 4/23/23 with residual LCx and OM disease; (MVD CABG declined). HFrEF newly reduced EF to 33%. Patient required IABP during hospitalization which was removed 4/25. Had labile blood pressures. Obtained BP monitor. He had short term follow up with me to optimize his GDMT, he was seen 6/15 where he noted to not experience chest pain, dizziness, palpitations, headaches, paroxysmal nocturnal dyspnea (PND), or orthopnea. They are capable of achieving a workload exceeding 4 metabolic equivalents (Mets) and are close to their normal baseline. He also had an AMBU BP monitor noted below. He was started on an ARB and his metoprolol was increased with lab work showing now worsening in creatine function. He did note since starting atorvastatin he had a period of acheness which improved and his CK was WNL   Today: Cardiac ROS was WNL. Patient recently went on hike in [place] [employer] without any issues. Has taken bps at home with systolics ranging in the 100s-110s.   /79 HR 79  Current Medications: Metformin 500 mg qd Clopidogrel 75 mg qd ASA 81 qd Metoprolol 25 -> increase to 50  Atorvastatin 80 mg qhs Losartan 25 mg  Spironolactone 25 mg   Cath data: Coronary Angiography LM Left main artery: The segment is visually normal in size and structure LAD Proximal left anterior descending: There is a 90 % stenosis in the proximal third portion of the segment. Midleft anterior descending: There is a 20 % in-stent restenosis in the middle third portion of the segment. Mid left anterior descending: There  Conclusions: Status post angioplasty, IVUS and stenting (drug-eluting/Medtronic [place]) of the left anterior descending artery with resultant RAINE 3 flow is a 70 % stenosis in the distal third portion of the segment. First diagonal: There is a 20 % stenosis.  CX Proximal circumflex: There is a 70 % stenosis in the distal third portion of the segment. First obtuse marginal: There is an 80 % stenosis in the ostium portion of the segment.  RCA Proximal right coronary artery: The segment is visually normal in size and structure.  5/24/23 Patet stent in the left anterior descending artery with no in-stent  restenosis with RAINE 3 flow    Status post IFR of the 1st obtuse marginal artery. The IFR was 0.96.  Findings suggestive of hemodynamically non-obstructive  coronary artery disease    Status post IFR of the left circumflex artery. The IFR was 1.00.  Findings suggestive of hemodynamically non-obstructive  coronary artery diseas

## 2023-08-15 DIAGNOSIS — I50.20 UNSPECIFIED SYSTOLIC (CONGESTIVE) HEART FAILURE: ICD-10-CM

## 2023-09-12 ENCOUNTER — APPOINTMENT (OUTPATIENT)
Dept: INTERNAL MEDICINE | Facility: CLINIC | Age: 63
End: 2023-09-12

## 2023-09-14 ENCOUNTER — OUTPATIENT (OUTPATIENT)
Dept: OUTPATIENT SERVICES | Facility: HOSPITAL | Age: 63
LOS: 1 days | End: 2023-09-14
Payer: SELF-PAY

## 2023-09-14 ENCOUNTER — APPOINTMENT (OUTPATIENT)
Dept: CARDIOLOGY | Facility: HOSPITAL | Age: 63
End: 2023-09-14

## 2023-09-14 VITALS
DIASTOLIC BLOOD PRESSURE: 76 MMHG | OXYGEN SATURATION: 97 % | HEART RATE: 67 BPM | HEIGHT: 66 IN | BODY MASS INDEX: 22.18 KG/M2 | SYSTOLIC BLOOD PRESSURE: 123 MMHG | WEIGHT: 138 LBS

## 2023-09-14 DIAGNOSIS — E11.8 TYPE 2 DIABETES MELLITUS WITH UNSPECIFIED COMPLICATIONS: ICD-10-CM

## 2023-09-14 DIAGNOSIS — R03.0 ELEVATED BLOOD-PRESSURE READING, W/OUT DIAGNOSIS OF HYPERTENSION: ICD-10-CM

## 2023-09-14 DIAGNOSIS — I25.10 ATHEROSCLEROTIC HEART DISEASE OF NATIVE CORONARY ARTERY WITHOUT ANGINA PECTORIS: ICD-10-CM

## 2023-09-14 PROCEDURE — G0463: CPT

## 2023-09-14 PROCEDURE — 93005 ELECTROCARDIOGRAM TRACING: CPT

## 2023-09-19 DIAGNOSIS — I50.20 UNSPECIFIED SYSTOLIC (CONGESTIVE) HEART FAILURE: ICD-10-CM

## 2023-09-19 DIAGNOSIS — E11.8 TYPE 2 DIABETES MELLITUS WITH UNSPECIFIED COMPLICATIONS: ICD-10-CM

## 2023-10-19 ENCOUNTER — APPOINTMENT (OUTPATIENT)
Dept: INTERNAL MEDICINE | Facility: CLINIC | Age: 63
End: 2023-10-19
Payer: COMMERCIAL

## 2023-10-19 ENCOUNTER — OUTPATIENT (OUTPATIENT)
Dept: OUTPATIENT SERVICES | Facility: HOSPITAL | Age: 63
LOS: 1 days | End: 2023-10-19
Payer: SELF-PAY

## 2023-10-19 VITALS
HEART RATE: 78 BPM | SYSTOLIC BLOOD PRESSURE: 104 MMHG | WEIGHT: 135 LBS | HEIGHT: 66 IN | BODY MASS INDEX: 21.69 KG/M2 | OXYGEN SATURATION: 98 % | DIASTOLIC BLOOD PRESSURE: 70 MMHG

## 2023-10-19 DIAGNOSIS — Z83.438 FAMILY HISTORY OF OTHER DISORDER OF LIPOPROTEIN METABOLISM AND OTHER LIPIDEMIA: ICD-10-CM

## 2023-10-19 DIAGNOSIS — I10 ESSENTIAL (PRIMARY) HYPERTENSION: ICD-10-CM

## 2023-10-19 PROCEDURE — 99386 PREV VISIT NEW AGE 40-64: CPT | Mod: GC

## 2023-10-19 RX ORDER — METOPROLOL SUCCINATE 50 MG/1
50 TABLET, EXTENDED RELEASE ORAL
Qty: 90 | Refills: 3 | Status: ACTIVE | COMMUNITY
Start: 2023-05-08 | End: 1900-01-01

## 2023-10-20 ENCOUNTER — MED ADMIN CHARGE (OUTPATIENT)
Age: 63
End: 2023-10-20

## 2023-10-20 ENCOUNTER — APPOINTMENT (OUTPATIENT)
Dept: INTERNAL MEDICINE | Facility: CLINIC | Age: 63
End: 2023-10-20

## 2023-10-20 PROCEDURE — 83036 HEMOGLOBIN GLYCOSYLATED A1C: CPT

## 2023-10-20 PROCEDURE — 85027 COMPLETE CBC AUTOMATED: CPT

## 2023-10-20 PROCEDURE — G0463: CPT

## 2023-10-20 PROCEDURE — 80061 LIPID PANEL: CPT

## 2023-10-20 PROCEDURE — 82043 UR ALBUMIN QUANTITATIVE: CPT

## 2023-10-20 RX ORDER — ATORVASTATIN CALCIUM 80 MG/1
80 TABLET, FILM COATED ORAL DAILY
Qty: 90 | Refills: 1 | Status: DISCONTINUED | COMMUNITY
Start: 2023-05-08 | End: 2023-10-20

## 2023-10-23 DIAGNOSIS — E11.9 TYPE 2 DIABETES MELLITUS WITHOUT COMPLICATIONS: ICD-10-CM

## 2023-10-23 LAB
CHOLEST SERPL-MCNC: 109 MG/DL
CREAT SPEC-SCNC: 13 MG/DL
ESTIMATED AVERAGE GLUCOSE: 169 MG/DL
HBA1C MFR BLD HPLC: 7.5 %
HCT VFR BLD CALC: 38.3 %
HDLC SERPL-MCNC: 43 MG/DL
HGB BLD-MCNC: 12.4 G/DL
LDLC SERPL CALC-MCNC: 49 MG/DL
MCHC RBC-ENTMCNC: 29.6 PG
MCHC RBC-ENTMCNC: 32.4 GM/DL
MCV RBC AUTO: 91.4 FL
MICROALBUMIN 24H UR DL<=1MG/L-MCNC: <1.2 MG/DL
MICROALBUMIN/CREAT 24H UR-RTO: NORMAL MG/G
NONHDLC SERPL-MCNC: 67 MG/DL
PLATELET # BLD AUTO: 268 K/UL
RBC # BLD: 4.19 M/UL
RBC # FLD: 14.8 %
TRIGL SERPL-MCNC: 88 MG/DL
WBC # FLD AUTO: 9.25 K/UL

## 2023-11-16 ENCOUNTER — OUTPATIENT (OUTPATIENT)
Dept: OUTPATIENT SERVICES | Facility: HOSPITAL | Age: 63
LOS: 1 days | End: 2023-11-16
Payer: SELF-PAY

## 2023-11-16 ENCOUNTER — APPOINTMENT (OUTPATIENT)
Dept: CARDIOLOGY | Facility: HOSPITAL | Age: 63
End: 2023-11-16

## 2023-11-16 VITALS
DIASTOLIC BLOOD PRESSURE: 69 MMHG | HEIGHT: 66 IN | OXYGEN SATURATION: 100 % | HEART RATE: 66 BPM | BODY MASS INDEX: 21.69 KG/M2 | WEIGHT: 135 LBS | SYSTOLIC BLOOD PRESSURE: 118 MMHG

## 2023-11-16 DIAGNOSIS — I25.10 ATHEROSCLEROTIC HEART DISEASE OF NATIVE CORONARY ARTERY WITHOUT ANGINA PECTORIS: ICD-10-CM

## 2023-11-16 PROCEDURE — G0463: CPT

## 2023-11-20 ENCOUNTER — OUTPATIENT (OUTPATIENT)
Dept: OUTPATIENT SERVICES | Facility: HOSPITAL | Age: 63
LOS: 1 days | End: 2023-11-20
Payer: SELF-PAY

## 2023-11-20 ENCOUNTER — APPOINTMENT (OUTPATIENT)
Dept: INTERNAL MEDICINE | Facility: CLINIC | Age: 63
End: 2023-11-20
Payer: COMMERCIAL

## 2023-11-20 ENCOUNTER — NON-APPOINTMENT (OUTPATIENT)
Age: 63
End: 2023-11-20

## 2023-11-20 VITALS
WEIGHT: 135 LBS | HEIGHT: 66 IN | BODY MASS INDEX: 21.69 KG/M2 | HEART RATE: 77 BPM | OXYGEN SATURATION: 98 % | DIASTOLIC BLOOD PRESSURE: 64 MMHG | SYSTOLIC BLOOD PRESSURE: 110 MMHG

## 2023-11-20 DIAGNOSIS — I10 ESSENTIAL (PRIMARY) HYPERTENSION: ICD-10-CM

## 2023-11-20 PROCEDURE — G0463: CPT

## 2023-11-20 PROCEDURE — 99213 OFFICE O/P EST LOW 20 MIN: CPT | Mod: GE

## 2023-11-27 ENCOUNTER — NON-APPOINTMENT (OUTPATIENT)
Age: 63
End: 2023-11-27

## 2023-11-27 DIAGNOSIS — I50.20 UNSPECIFIED SYSTOLIC (CONGESTIVE) HEART FAILURE: ICD-10-CM

## 2023-11-27 DIAGNOSIS — I25.10 ATHEROSCLEROTIC HEART DISEASE OF NATIVE CORONARY ARTERY WITHOUT ANGINA PECTORIS: ICD-10-CM

## 2023-11-27 DIAGNOSIS — E11.9 TYPE 2 DIABETES MELLITUS WITHOUT COMPLICATIONS: ICD-10-CM

## 2024-01-29 ENCOUNTER — MED ADMIN CHARGE (OUTPATIENT)
Age: 64
End: 2024-01-29

## 2024-01-29 ENCOUNTER — APPOINTMENT (OUTPATIENT)
Dept: INTERNAL MEDICINE | Facility: CLINIC | Age: 64
End: 2024-01-29
Payer: COMMERCIAL

## 2024-01-29 ENCOUNTER — OUTPATIENT (OUTPATIENT)
Dept: OUTPATIENT SERVICES | Facility: HOSPITAL | Age: 64
LOS: 1 days | End: 2024-01-29
Payer: SELF-PAY

## 2024-01-29 VITALS
DIASTOLIC BLOOD PRESSURE: 72 MMHG | HEART RATE: 82 BPM | SYSTOLIC BLOOD PRESSURE: 110 MMHG | BODY MASS INDEX: 21.69 KG/M2 | WEIGHT: 135 LBS | HEIGHT: 66 IN | OXYGEN SATURATION: 99 %

## 2024-01-29 DIAGNOSIS — I10 ESSENTIAL (PRIMARY) HYPERTENSION: ICD-10-CM

## 2024-01-29 DIAGNOSIS — E11.9 TYPE 2 DIABETES MELLITUS WITHOUT COMPLICATIONS: ICD-10-CM

## 2024-01-29 DIAGNOSIS — N52.9 MALE ERECTILE DYSFUNCTION, UNSPECIFIED: ICD-10-CM

## 2024-01-29 DIAGNOSIS — I25.10 ATHEROSCLEROTIC HEART DISEASE OF NATIVE CORONARY ARTERY WITHOUT ANGINA PECTORIS: ICD-10-CM

## 2024-01-29 PROCEDURE — 90750 HZV VACC RECOMBINANT IM: CPT

## 2024-01-29 PROCEDURE — 90471 IMMUNIZATION ADMIN: CPT

## 2024-01-29 PROCEDURE — 99213 OFFICE O/P EST LOW 20 MIN: CPT | Mod: GE

## 2024-01-29 RX ORDER — DAPAGLIFLOZIN 10 MG/1
10 TABLET, FILM COATED ORAL DAILY
Qty: 30 | Refills: 3 | Status: ACTIVE | OUTPATIENT
Start: 2023-09-14

## 2024-01-29 RX ORDER — TADALAFIL 10 MG/1
10 TABLET, FILM COATED ORAL
Qty: 7 | Refills: 0 | Status: ACTIVE | COMMUNITY
Start: 2024-01-29 | End: 1900-01-01

## 2024-01-29 RX ORDER — METFORMIN HYDROCHLORIDE 500 MG/1
500 TABLET, COATED ORAL
Qty: 60 | Refills: 3 | Status: ACTIVE | COMMUNITY
Start: 2023-10-03 | End: 1900-01-01

## 2024-01-29 NOTE — ASSESSMENT
[FreeTextEntry1] : #HCM - Shingles vaccine today - Will discuss colonoscopy at 1 year bharat from MI (4/2023) when pt able to be taken off DAPT by cardiology - Will need to follow-up and then later in the year will need repeat CPE (last 10/2023)  RTC in 2-3 months for a1c check on Farxiga, 2nd Shingles dose

## 2024-01-29 NOTE — HISTORY OF PRESENT ILLNESS
[de-identified] : 62 yr old man with a past medical history of HFreF, diabetes, CAD presents today for a follow-up visit.   #HFrEF #DM - Was able to obtain Farxiga and is taking appropriately. No issues w/ chest pain, SOB, or leg swelling.   #ED - Reporting ED, wondering if cialis is an option  #HCM - Colonoscopy due, would defer until next visit - Amenable to Shingrix - Not interested in Tdap as reportedly had in [id number].

## 2024-02-01 ENCOUNTER — OUTPATIENT (OUTPATIENT)
Dept: OUTPATIENT SERVICES | Facility: HOSPITAL | Age: 64
LOS: 1 days | End: 2024-02-01
Payer: SELF-PAY

## 2024-02-01 ENCOUNTER — APPOINTMENT (OUTPATIENT)
Dept: CARDIOLOGY | Facility: HOSPITAL | Age: 64
End: 2024-02-01

## 2024-02-01 VITALS
SYSTOLIC BLOOD PRESSURE: 111 MMHG | DIASTOLIC BLOOD PRESSURE: 70 MMHG | HEIGHT: 66 IN | HEART RATE: 62 BPM | OXYGEN SATURATION: 100 % | WEIGHT: 140 LBS | BODY MASS INDEX: 22.5 KG/M2

## 2024-02-01 DIAGNOSIS — I50.20 UNSPECIFIED SYSTOLIC (CONGESTIVE) HEART FAILURE: ICD-10-CM

## 2024-02-01 DIAGNOSIS — I25.10 ATHEROSCLEROTIC HEART DISEASE OF NATIVE CORONARY ARTERY WITHOUT ANGINA PECTORIS: ICD-10-CM

## 2024-02-01 PROCEDURE — G0463: CPT

## 2024-02-01 NOTE — HISTORY OF PRESENT ILLNESS
[FreeTextEntry1] : 63M w CAD s/p PCI 2013 (stent to LAD), DM2, tobacco use, with recent presentation of STEMI. Now s/p YADIRA to LAD x2 4/23/23 with residual LCx and OM disease; (MVD CABG declined). HFrEF newly reduced EF to 33%. Patient required IABP during hospitalization which was removed 4/25. Had labile blood pressures. Obtained BP monitor. He had short term follow up with me to optimize his GDMT, he was seen 6/15 where he noted to not experience chest pain, dizziness, palpitations, headaches, paroxysmal nocturnal dyspnea (PND), or orthopnea. Hei s capable of achieving a workload exceeding 4 metabolic equivalents (Mets) and are close to their normal baseline. He also had an AMBU BP monitor noted below. He was started on an ARB and his metoprolol was increased with lab work showing worsening in creatine function. He did note since starting atorvastatin he had a period of acheness which improved and his CK was WNL. Since following outpatient we have been able to up-titrate his GDMT with subsequent improvement in his LVEF, symptoms, blood pressure. Oh note, he was planned for outpatient staged PCI however he was taken to cath lab with normal FFR and no intervention was deemed necessary shortly after his discharge. He is currently on BB, ARB, MRA in the process of starting a SGLT-2 inhibitor with help of new PCP. Recently started on faxiga by PCP and started on cialis for ED.    Since Current Medications: Metformin 500 mg qd Clopidogrel 75 mg qd ASA 81 qd Metoprolol 50 Atorvastatin 80 mg qhs Losartan 25 mg Spironolactone 25 mg Farxiga 10  TTE 8/1/2023: LVEF 46%, Normal Atria, mild reduced RV function, Segmental wall motion abl similar to prior, PASP 26, Strain 15.6% Cath data: Coronary Angiography LM Left main artery: The segment is visually normal in size and structure LAD Proximal left anterior descending: There is a 90 % stenosis in the proximal third portion of the segment. Midleft anterior descending: There is a 20 % in-stent restenosis in the middle third portion of the segment. Mid left anterior descending: There Conclusions: Status post angioplasty, IVUS and stenting (drug-eluting/[employer] [place]) of the left anterior descending artery with resultant RAINE 3 flow is a 70 % stenosis in the distal third portion of the segment. First diagonal: There is a 20 % stenosis. CX Proximal circumflex: There is a 70 % stenosis in the distal third portion of the segment. First obtuse marginal: There is an 80 % stenosis in the ostium portion of the segment. RCA Proximal right coronary artery: The segment is visually normal in size and structure. 5/24/23 Patet stent in the left anterior descending artery with no in-stent restenosis with RAINE 3 flow Status post IFR of the 1st obtuse marginal artery. The IFR was 0.96. Findings suggestive of hemodynamically non-obstructive coronary artery disease Status post IFR of the left circumflex artery. The IFR was 1.00. Findings suggestive of hemodynamically non-obstructive coronary artery disease

## 2024-02-06 DIAGNOSIS — Z23 ENCOUNTER FOR IMMUNIZATION: ICD-10-CM

## 2024-04-08 ENCOUNTER — APPOINTMENT (OUTPATIENT)
Dept: INTERNAL MEDICINE | Facility: CLINIC | Age: 64
End: 2024-04-08
Payer: COMMERCIAL

## 2024-04-08 ENCOUNTER — MED ADMIN CHARGE (OUTPATIENT)
Age: 64
End: 2024-04-08

## 2024-04-08 ENCOUNTER — OUTPATIENT (OUTPATIENT)
Dept: OUTPATIENT SERVICES | Facility: HOSPITAL | Age: 64
LOS: 1 days | End: 2024-04-08
Payer: SELF-PAY

## 2024-04-08 VITALS
HEART RATE: 71 BPM | DIASTOLIC BLOOD PRESSURE: 70 MMHG | OXYGEN SATURATION: 98 % | WEIGHT: 131 LBS | BODY MASS INDEX: 21.05 KG/M2 | SYSTOLIC BLOOD PRESSURE: 108 MMHG | HEIGHT: 66 IN

## 2024-04-08 DIAGNOSIS — I10 ESSENTIAL (PRIMARY) HYPERTENSION: ICD-10-CM

## 2024-04-08 PROCEDURE — 90471 IMMUNIZATION ADMIN: CPT

## 2024-04-08 PROCEDURE — G0463: CPT | Mod: 25

## 2024-04-08 PROCEDURE — 90750 HZV VACC RECOMBINANT IM: CPT

## 2024-04-08 PROCEDURE — 83036 HEMOGLOBIN GLYCOSYLATED A1C: CPT

## 2024-04-08 PROCEDURE — 85027 COMPLETE CBC AUTOMATED: CPT

## 2024-04-08 PROCEDURE — 80053 COMPREHEN METABOLIC PANEL: CPT

## 2024-04-08 PROCEDURE — 99213 OFFICE O/P EST LOW 20 MIN: CPT | Mod: GE,25

## 2024-04-08 RX ORDER — ATORVASTATIN CALCIUM 40 MG/1
40 TABLET, FILM COATED ORAL
Qty: 60 | Refills: 2 | Status: ACTIVE | COMMUNITY
Start: 2023-10-20 | End: 1900-01-01

## 2024-04-08 RX ORDER — LOSARTAN POTASSIUM 25 MG/1
25 TABLET, FILM COATED ORAL DAILY
Qty: 30 | Refills: 3 | Status: ACTIVE | COMMUNITY
Start: 2023-06-15 | End: 1900-01-01

## 2024-04-08 RX ORDER — SPIRONOLACTONE 25 MG/1
25 TABLET ORAL DAILY
Qty: 30 | Refills: 3 | Status: ACTIVE | COMMUNITY
Start: 2023-06-29 | End: 1900-01-01

## 2024-04-10 NOTE — HISTORY OF PRESENT ILLNESS
[FreeTextEntry1] : Follow-up of Diabetes [de-identified] : 63-yr old man with PMHx of HFrEF w/ recovered EF, T2DM, CAD w/ prior STEMI s/p stenting presents today for a follow-up visit. Overall Mr. Ramirez feels well with no acute concerns.  #HFrEF - Most recent LVEF was 46% which is improved from 33%. Follows w/ Dr. Michel from Cardiology. - Taking Losartan, Metoprolol, Spironolactone, Farxiga.  - Warrants 3 month monitoring of K  #CAD - Prior STEMI s/p stents - Taking Aspirin and Atorvastatin 80 mg and Plavix, continuing DAPT per Cardiology  #DM - Metformin 500 mg QD still due to fear of losing weight - pt instructed to take twice daily, amenable to this.  - Discussed GLP-1 with patient and diabetic + ASCVD secondary prevention benefit - patient concerned re: weight loss effect.  - Previous a1c 7.5, now 7.4 on POC - Amenable to ophtho referral for eye testing.   #HCM - Discussed colon cancer screening w/ patient - discussed FOBT vs. Colonoscopy which will require holding DAPT. Pt afraid of anesthesia effects so opted for FOBT - 2nd Dose of Shingles Shot

## 2024-04-10 NOTE — ASSESSMENT
[FreeTextEntry1] : #HCM - Second shingles shot - FOBT given  - Defer tdap as pt receiving shot already this visit, does not want another shot.   RTC in 3 months for a1c check, BMP check.

## 2024-04-11 ENCOUNTER — NON-APPOINTMENT (OUTPATIENT)
Age: 64
End: 2024-04-11

## 2024-04-11 LAB
ALBUMIN SERPL ELPH-MCNC: 4.9 G/DL
ALP BLD-CCNC: 90 U/L
ALT SERPL-CCNC: 15 U/L
ANION GAP SERPL CALC-SCNC: 16 MMOL/L
AST SERPL-CCNC: 23 U/L
BILIRUB SERPL-MCNC: 0.4 MG/DL
BUN SERPL-MCNC: 13 MG/DL
CALCIUM SERPL-MCNC: 9.7 MG/DL
CHLORIDE SERPL-SCNC: 99 MMOL/L
CO2 SERPL-SCNC: 22 MMOL/L
CREAT SERPL-MCNC: 1.07 MG/DL
EGFR: 79 ML/MIN/1.73M2
GLUCOSE SERPL-MCNC: 101 MG/DL
HBA1C MFR BLD HPLC: 7.4
HCT VFR BLD CALC: 42.5 %
HGB BLD-MCNC: 13.8 G/DL
MCHC RBC-ENTMCNC: 30 PG
MCHC RBC-ENTMCNC: 32.5 GM/DL
MCV RBC AUTO: 92.4 FL
PLATELET # BLD AUTO: 297 K/UL
POTASSIUM SERPL-SCNC: 4.4 MMOL/L
PROT SERPL-MCNC: 7.4 G/DL
RBC # BLD: 4.6 M/UL
RBC # FLD: 13.3 %
SODIUM SERPL-SCNC: 138 MMOL/L
WBC # FLD AUTO: 11.43 K/UL

## 2024-04-16 DIAGNOSIS — E11.9 TYPE 2 DIABETES MELLITUS WITHOUT COMPLICATIONS: ICD-10-CM

## 2024-04-16 DIAGNOSIS — I50.20 UNSPECIFIED SYSTOLIC (CONGESTIVE) HEART FAILURE: ICD-10-CM

## 2024-04-16 DIAGNOSIS — I25.10 ATHEROSCLEROTIC HEART DISEASE OF NATIVE CORONARY ARTERY WITHOUT ANGINA PECTORIS: ICD-10-CM

## 2024-04-16 DIAGNOSIS — Z23 ENCOUNTER FOR IMMUNIZATION: ICD-10-CM

## 2024-04-18 ENCOUNTER — APPOINTMENT (OUTPATIENT)
Dept: CARDIOLOGY | Facility: HOSPITAL | Age: 64
End: 2024-04-18

## 2024-04-18 ENCOUNTER — OUTPATIENT (OUTPATIENT)
Dept: OUTPATIENT SERVICES | Facility: HOSPITAL | Age: 64
LOS: 1 days | End: 2024-04-18
Payer: SELF-PAY

## 2024-04-18 VITALS
HEIGHT: 66 IN | WEIGHT: 130 LBS | OXYGEN SATURATION: 98 % | HEART RATE: 75 BPM | DIASTOLIC BLOOD PRESSURE: 70 MMHG | BODY MASS INDEX: 20.89 KG/M2 | SYSTOLIC BLOOD PRESSURE: 109 MMHG

## 2024-04-18 DIAGNOSIS — I25.10 ATHEROSCLEROTIC HEART DISEASE OF NATIVE CORONARY ARTERY WITHOUT ANGINA PECTORIS: ICD-10-CM

## 2024-04-18 PROCEDURE — G0463: CPT

## 2024-04-18 PROCEDURE — 93005 ELECTROCARDIOGRAM TRACING: CPT

## 2024-04-18 NOTE — HISTORY OF PRESENT ILLNESS
[FreeTextEntry1] : 63M w CAD s/p PCI 2013 (stent to LAD), DM2, tobacco use, with presentation of STEMI. Now s/p YADIRA to LAD x2 4/23/23 with residual LCx and OM disease; (MVD CABG declined). HFrEF newly reduced EF to 33%. Patient required IABP during hospitalization which was removed 4/25. Had labile blood pressures. Obtained BP monitor. He had short term follow up with me to optimize his GDMT, he was seen 6/15 where he noted to not experience chest pain, dizziness, palpitations, headaches, paroxysmal nocturnal dyspnea (PND), or orthopnea. Hei s capable of achieving a workload exceeding 4 metabolic equivalents (Mets) and are close to their normal baseline. He also had an AMBU BP monitor noted below. He was started on an ARB and his metoprolol was increased with lab work showing worsening in creatine function. He did note since starting atorvastatin he had a period of acheness which improved and his CK was WNL. Since following outpatient we have been able to up-titrate his GDMT with subsequent improvement in his LVEF, symptoms, blood pressure. Oh note, he was planned for outpatient staged PCI however he was taken to cath lab with normal FFR and no intervention was deemed necessary shortly after his discharge. He is currently on BB, ARB, MRA in the process of starting a SGLT-2 inhibitor with help of new PCP. Recently started on faxiga by PCP and started on cialis for ED.    Current Medications: Metformin 500 mg qd -> increased to 500 mg BID  Clopidogrel 75 mg qd ASA 81 qd Metoprolol 50 Atorvastatin 80 mg qhs Losartan 25 mg Spironolactone 25 mg Farxiga 10  Labs: Na 138, K 4.4, Cl 99, BUN 13, Cr 1.07 Hemoglobin a1c 7.4  CBC mild elevation in WBC  TTE 8/1/2023: LVEF 46%, Normal Atria, mild reduced RV function, Segmental wall motion abl similar to prior, PASP 26, Strain 15.6% Cath data: Coronary Angiography LM Left main artery: The segment is visually normal in size and structure LAD Proximal left anterior descending: There is a 90 % stenosis in the proximal third portion of the segment. Midleft anterior descending: There is a 20 % in-stent restenosis in the middle third portion of the segment. Mid left anterior descending: There Conclusions: Status post angioplasty, IVUS and stenting (drug-eluting/[employer] [place]) of the left anterior descending artery with resultant RAINE 3 flow is a 70 % stenosis in the distal third portion of the segment. First diagonal: There is a 20 % stenosis. CX Proximal circumflex: There is a 70 % stenosis in the distal third portion of the segment. First obtuse marginal: There is an 80 % stenosis in the ostium portion of the segment. RCA Proximal right coronary artery: The segment is visually normal in size and structure. 5/24/23 Patet stent in the left anterior descending artery with no in-stent restenosis with RAINE 3 flow Status post IFR of the 1st obtuse marginal artery. The IFR was 0.96. Findings suggestive of hemodynamically non-obstructive coronary artery disease Status post IFR of the left circumflex artery. The IFR was 1.00. Findings suggestive of hemodynamically non-obstructive coronary artery disease

## 2024-04-19 DIAGNOSIS — I50.20 UNSPECIFIED SYSTOLIC (CONGESTIVE) HEART FAILURE: ICD-10-CM

## 2024-06-17 ENCOUNTER — APPOINTMENT (OUTPATIENT)
Dept: INTERNAL MEDICINE | Facility: CLINIC | Age: 64
End: 2024-06-17
Payer: COMMERCIAL

## 2024-06-17 ENCOUNTER — OUTPATIENT (OUTPATIENT)
Dept: OUTPATIENT SERVICES | Facility: HOSPITAL | Age: 64
LOS: 1 days | End: 2024-06-17
Payer: SELF-PAY

## 2024-06-17 VITALS
HEIGHT: 66 IN | SYSTOLIC BLOOD PRESSURE: 116 MMHG | DIASTOLIC BLOOD PRESSURE: 68 MMHG | WEIGHT: 130 LBS | BODY MASS INDEX: 20.89 KG/M2 | OXYGEN SATURATION: 98 % | HEART RATE: 85 BPM

## 2024-06-17 DIAGNOSIS — E11.9 TYPE 2 DIABETES MELLITUS W/OUT COMPLICATIONS: ICD-10-CM

## 2024-06-17 DIAGNOSIS — Z23 ENCOUNTER FOR IMMUNIZATION: ICD-10-CM

## 2024-06-17 DIAGNOSIS — I10 ESSENTIAL (PRIMARY) HYPERTENSION: ICD-10-CM

## 2024-06-17 PROCEDURE — 99213 OFFICE O/P EST LOW 20 MIN: CPT | Mod: GE

## 2024-06-17 PROCEDURE — G0463: CPT

## 2024-06-17 PROCEDURE — 80048 BASIC METABOLIC PNL TOTAL CA: CPT

## 2024-06-17 PROCEDURE — 83036 HEMOGLOBIN GLYCOSYLATED A1C: CPT

## 2024-06-17 NOTE — HISTORY OF PRESENT ILLNESS
[FreeTextEntry1] : Follow-up [de-identified] : 63-yr old man with PMHx of HFrEF w/ recovered EF, T2DM, CAD w/ prior STEMI s/p stenting presents today for a follow-up visit. Overall Mr. Ramirez feels well with no acute concerns. #T2DM - a1c 7.4 previously, taking metformin 500 mg qd and farxiga 10 mg. Not taking Metformin 500 mg BID as when he takes during daytime, he gets "shakes" and feels his "blood sugar is dropping," he is calorie restricting as well. As a result he only takes medications during bedtime.   #HFrEF - Taking medications as prescribed Losartan, Metoprolol, Spironolactone, Farxiga. Follows w/ Cardiology. - Due for K check  #HCM - Due for FOBT - Due for diabetic eye exam - Due for low dose CT scanning for 20 yrs of smoking, less than a pack a day however. Quit 1 year ago. - Due for tdap but does not want vaccine at this time.  - Will be due for AAA when 65+ yo

## 2024-06-17 NOTE — ASSESSMENT
[FreeTextEntry1] : #HCM - FOBT ordered and test given to pt - Low dose CT ordered - AAA screening once pt is 65+ - ECG at next visit - tdap deferred to hext visit per pt request  RTC for CPE in October

## 2024-06-18 RX ORDER — CLOPIDOGREL BISULFATE 75 MG/1
75 TABLET, FILM COATED ORAL
Qty: 90 | Refills: 3 | Status: ACTIVE | COMMUNITY
Start: 2023-12-13 | End: 1900-01-01

## 2024-06-19 ENCOUNTER — NON-APPOINTMENT (OUTPATIENT)
Age: 64
End: 2024-06-19

## 2024-06-19 LAB
ANION GAP SERPL CALC-SCNC: 15 MMOL/L
BUN SERPL-MCNC: 15 MG/DL
CALCIUM SERPL-MCNC: 9.5 MG/DL
CHLORIDE SERPL-SCNC: 103 MMOL/L
CO2 SERPL-SCNC: 19 MMOL/L
CREAT SERPL-MCNC: 0.94 MG/DL
EGFR: 92 ML/MIN/1.73M2
ESTIMATED AVERAGE GLUCOSE: 151 MG/DL
GLUCOSE SERPL-MCNC: 127 MG/DL
HBA1C MFR BLD HPLC: 6.9 %
POTASSIUM SERPL-SCNC: 3.8 MMOL/L
SODIUM SERPL-SCNC: 136 MMOL/L

## 2024-06-20 ENCOUNTER — OUTPATIENT (OUTPATIENT)
Dept: OUTPATIENT SERVICES | Facility: HOSPITAL | Age: 64
LOS: 1 days | End: 2024-06-20
Payer: SELF-PAY

## 2024-06-20 ENCOUNTER — APPOINTMENT (OUTPATIENT)
Dept: CARDIOLOGY | Facility: HOSPITAL | Age: 64
End: 2024-06-20

## 2024-06-20 VITALS
HEART RATE: 80 BPM | SYSTOLIC BLOOD PRESSURE: 107 MMHG | HEIGHT: 66 IN | WEIGHT: 130 LBS | DIASTOLIC BLOOD PRESSURE: 71 MMHG | OXYGEN SATURATION: 98 % | BODY MASS INDEX: 20.89 KG/M2

## 2024-06-20 DIAGNOSIS — I25.10 ATHEROSCLEROTIC HEART DISEASE OF NATIVE CORONARY ARTERY W/OUT ANGINA PECTORIS: ICD-10-CM

## 2024-06-20 DIAGNOSIS — I25.10 ATHEROSCLEROTIC HEART DISEASE OF NATIVE CORONARY ARTERY WITHOUT ANGINA PECTORIS: ICD-10-CM

## 2024-06-20 DIAGNOSIS — I50.20 UNSPECIFIED SYSTOLIC (CONGESTIVE) HEART FAILURE: ICD-10-CM

## 2024-06-20 PROCEDURE — 93005 ELECTROCARDIOGRAM TRACING: CPT

## 2024-06-20 PROCEDURE — G0463: CPT

## 2024-06-20 NOTE — HISTORY OF PRESENT ILLNESS
[FreeTextEntry1] : 63M w CAD s/p PCI 2013 (stent to LAD), DM2 (6.9%(, tobacco use, with presentation of STEMI. Now s/p YADIRA to LAD x2 4/23/23 with residual LCx and OM disease; (MVD CABG declined). HFrEF newly reduced EF to 33%. Patient required IABP during hospitalization which was removed 4/25. Had labile blood pressures. Obtained BP monitor. He had short term follow up with me to optimize his GDMT, he was seen 6/15 where he noted to not experience chest pain, dizziness, palpitations, headaches, paroxysmal nocturnal dyspnea (PND), or orthopnea. Hei s capable of achieving a workload exceeding 4 metabolic equivalents (Mets) and are close to their normal baseline. He also had an AMBU BP monitor noted below. He was started on an ARB and his metoprolol was increased with lab work showing worsening in creatine function. He did note since starting atorvastatin he had a period of acheness which improved and his CK was WNL. Since following outpatient we have been able to up-titrate his GDMT with subsequent improvement in his LVEF, symptoms, blood pressure. Oh note, he was planned for outpatient staged PCI however he was taken to cath lab with normal FFR and no intervention was deemed necessary shortly after his discharge. He is currently on BB, ARB, MRA in the process of starting a SGLT-2 inhibitor with help of new PCP. Recently started on faxiga by PCP and started on cialis for ED  Today: Cardiac ROS was negative. He reports excellent ET, no dyspnea, no PND, and no orthopnea. Recent refill of medications with PCP.    Current Medications: Metformin 1000 mg qhs  Clopidogrel 75 mg qd ASA 81 qd Metoprolol 50 Atorvastatin 80 mg qhs Losartan 25 mg Spironolactone 25 mg Farxiga 10  Labs: Cr 0.94, BUN 15 Hemoglobin a1c 6.9   TTE 8/1/2023: LVEF 46%, Normal Atria, mild reduced RV function, Segmental wall motion abl similar to prior, PASP 26, Strain 15.6% Cath data: Coronary Angiography LM Left main artery: The segment is visually normal in size and structure LAD Proximal left anterior descending: There is a 90 % stenosis in the proximal third portion of the segment. Midleft anterior descending: There is a 20 % in-stent restenosis in the middle third portion of the segment. Mid left anterior descending: There Conclusions: Status post angioplasty, IVUS and stenting (drug-eluting/[employer] [place]) of the left anterior descending artery with resultant RAINE 3 flow is a 70 % stenosis in the distal third portion of the segment. First diagonal: There is a 20 % stenosis. CX Proximal circumflex: There is a 70 % stenosis in the distal third portion of the segment. First obtuse marginal: There is an 80 % stenosis in the ostium portion of the segment. RCA Proximal right coronary artery: The segment is visually normal in size and structure. 5/24/23 Patet stent in the left anterior descending artery with no in-stent restenosis with RAINE 3 flow Status post IFR of the 1st obtuse marginal artery. The IFR was 0.96. Findings suggestive of hemodynamically non-obstructive coronary artery disease Status post IFR of the left circumflex artery. The IFR was 1.00. Findings suggestive of hemodynamically non-obstructive coronary artery disease .

## 2024-06-21 DIAGNOSIS — I50.20 UNSPECIFIED SYSTOLIC (CONGESTIVE) HEART FAILURE: ICD-10-CM

## 2024-06-24 DIAGNOSIS — I50.20 UNSPECIFIED SYSTOLIC (CONGESTIVE) HEART FAILURE: ICD-10-CM

## 2024-06-24 DIAGNOSIS — E11.9 TYPE 2 DIABETES MELLITUS WITHOUT COMPLICATIONS: ICD-10-CM

## 2024-09-26 ENCOUNTER — APPOINTMENT (OUTPATIENT)
Dept: CARDIOLOGY | Facility: HOSPITAL | Age: 64
End: 2024-09-26

## 2024-09-30 ENCOUNTER — APPOINTMENT (OUTPATIENT)
Dept: INTERNAL MEDICINE | Facility: CLINIC | Age: 64
End: 2024-09-30
Payer: SELF-PAY

## 2024-09-30 ENCOUNTER — OUTPATIENT (OUTPATIENT)
Dept: OUTPATIENT SERVICES | Facility: HOSPITAL | Age: 64
LOS: 1 days | End: 2024-09-30
Payer: SELF-PAY

## 2024-09-30 VITALS
SYSTOLIC BLOOD PRESSURE: 110 MMHG | BODY MASS INDEX: 21.69 KG/M2 | HEART RATE: 74 BPM | DIASTOLIC BLOOD PRESSURE: 70 MMHG | OXYGEN SATURATION: 99 % | WEIGHT: 135 LBS | HEIGHT: 66 IN

## 2024-09-30 DIAGNOSIS — N52.9 MALE ERECTILE DYSFUNCTION, UNSPECIFIED: ICD-10-CM

## 2024-09-30 DIAGNOSIS — F17.210 NICOTINE DEPENDENCE, CIGARETTES, UNCOMPLICATED: ICD-10-CM

## 2024-09-30 DIAGNOSIS — Z23 ENCOUNTER FOR IMMUNIZATION: ICD-10-CM

## 2024-09-30 DIAGNOSIS — I50.20 UNSPECIFIED SYSTOLIC (CONGESTIVE) HEART FAILURE: ICD-10-CM

## 2024-09-30 DIAGNOSIS — I10 ESSENTIAL (PRIMARY) HYPERTENSION: ICD-10-CM

## 2024-09-30 PROCEDURE — G0463: CPT

## 2024-09-30 PROCEDURE — 85027 COMPLETE CBC AUTOMATED: CPT

## 2024-09-30 PROCEDURE — 99406 BEHAV CHNG SMOKING 3-10 MIN: CPT

## 2024-09-30 PROCEDURE — G0008: CPT

## 2024-09-30 PROCEDURE — 90656 IIV3 VACC NO PRSV 0.5 ML IM: CPT

## 2024-09-30 PROCEDURE — 82043 UR ALBUMIN QUANTITATIVE: CPT

## 2024-09-30 PROCEDURE — 83036 HEMOGLOBIN GLYCOSYLATED A1C: CPT

## 2024-09-30 PROCEDURE — 80053 COMPREHEN METABOLIC PANEL: CPT

## 2024-09-30 PROCEDURE — 99396 PREV VISIT EST AGE 40-64: CPT | Mod: GC

## 2024-09-30 PROCEDURE — 99214 OFFICE O/P EST MOD 30 MIN: CPT | Mod: GC,25

## 2024-09-30 PROCEDURE — 80061 LIPID PANEL: CPT

## 2024-09-30 RX ORDER — KRILL/OM-3/DHA/EPA/PHOSPHO/AST 1000-230MG
81 CAPSULE ORAL
Qty: 60 | Refills: 3 | Status: ACTIVE | COMMUNITY
Start: 2024-09-30 | End: 1900-01-01

## 2024-09-30 NOTE — HEALTH RISK ASSESSMENT
[Good] : ~his/her~  mood as  good [Yes] : Yes [Current] : Current [With Significant Other] : lives with significant other [] :  [Sexually Active] : sexually active [Fully functional (bathing, dressing, toileting, transferring, walking, feeding)] : Fully functional (bathing, dressing, toileting, transferring, walking, feeding) [Fully functional (using the telephone, shopping, preparing meals, housekeeping, doing laundry, using] : Fully functional and needs no help or supervision to perform IADLs (using the telephone, shopping, preparing meals, housekeeping, doing laundry, using transportation, managing medications and managing finances) [de-identified] : one drink a week [de-identified] : 1 cigarette a day, off and on smoking, for 30 years [High Risk Behavior] : no high risk behavior

## 2024-09-30 NOTE — HISTORY OF PRESENT ILLNESS
[FreeTextEntry1] : CPE [de-identified] : 63-yr old man with PMHx of HFrEF w/ recovered EF, T2DM, CAD w/ prior STEMI s/p stenting presents today for a follow-up visit. Overall Mr. Ramirez feels well with no acute concerns.  #CAD - Taking ASA/Plavix - Concerns re: atorvastatin script was sent as 40 mg nightly, supposed to be 80 mg nightly.  - ECG at last visit in June wnl  - Denies chest pain or SOB - Pt informed me will reconnect at [facility] to establish care w/ Cardiologist since Dr. Michel is now an attending outside of the state.   #HFrEF - Taking Metoprolol Succinate 50 mg QD - Taking Taking Farxiga 10 mg QD through PAP - Taking Losartan 25 mg QD - Taking Spironolactone 25 mg QD, due for K check - Denies any recent edema.  #T2DM - Taking Metformin 850 mg QD instead of 500 mg BID due to unusual side effect - reports hunger. Amenable to trialing 1000 mg QD.  - Due for eye, foot exams, microalbumin  #ED - Taking Tadalafil 10 mg PRN  #HCM - Reminded pt to turn in FOBT - Due for low dose CT scan - Pt hesitant re: tdap vaccine, was told previously a once lifetime vaccine, informed pt due every 10 years, pt wants to figure out when last had vaccine.  - Due for CBC, CMP, a1c, lipid panel

## 2024-09-30 NOTE — PHYSICAL EXAM
[No Focal Deficits] : no focal deficits [Normal] : normal gait, coordination grossly intact, no focal deficits and deep tendon reflexes were 2+ and symmetric [] : both feet

## 2024-09-30 NOTE — ASSESSMENT
[FreeTextEntry1] : #HCM - Dietician referral per pt request - Reminded pt to have FOBT done - Reordered low dose CT scan - Defer Tdap - Flu today - CBC, CMP, a1c, Lipid Panel  RTC in 3 months for DM follow-up.

## 2024-10-01 ENCOUNTER — NON-APPOINTMENT (OUTPATIENT)
Age: 64
End: 2024-10-01

## 2024-10-01 LAB
ALBUMIN SERPL ELPH-MCNC: 5 G/DL
ALP BLD-CCNC: 122 U/L
ALT SERPL-CCNC: 12 U/L
ANION GAP SERPL CALC-SCNC: 13 MMOL/L
AST SERPL-CCNC: 23 U/L
BILIRUB SERPL-MCNC: <0.2 MG/DL
BUN SERPL-MCNC: 13 MG/DL
CALCIUM SERPL-MCNC: 10.3 MG/DL
CHLORIDE SERPL-SCNC: 102 MMOL/L
CHOLEST SERPL-MCNC: 158 MG/DL
CO2 SERPL-SCNC: 23 MMOL/L
CREAT SERPL-MCNC: 0.99 MG/DL
CREAT SPEC-SCNC: 25 MG/DL
EGFR: 87 ML/MIN/1.73M2
ESTIMATED AVERAGE GLUCOSE: 140 MG/DL
GLUCOSE SERPL-MCNC: 113 MG/DL
HBA1C MFR BLD HPLC: 6.5 %
HCT VFR BLD CALC: 38.2 %
HDLC SERPL-MCNC: 62 MG/DL
HGB BLD-MCNC: 11.6 G/DL
LDLC SERPL CALC-MCNC: 79 MG/DL
MCHC RBC-ENTMCNC: 27.2 PG
MCHC RBC-ENTMCNC: 30.4 GM/DL
MCV RBC AUTO: 89.5 FL
MICROALBUMIN 24H UR DL<=1MG/L-MCNC: <1.2 MG/DL
MICROALBUMIN/CREAT 24H UR-RTO: NORMAL MG/G
NONHDLC SERPL-MCNC: 96 MG/DL
PLATELET # BLD AUTO: 475 K/UL
POTASSIUM SERPL-SCNC: 4.5 MMOL/L
PROT SERPL-MCNC: 7.9 G/DL
RBC # BLD: 4.27 M/UL
RBC # FLD: 15.5 %
SODIUM SERPL-SCNC: 138 MMOL/L
TRIGL SERPL-MCNC: 92 MG/DL
WBC # FLD AUTO: 12.71 K/UL

## 2024-10-07 DIAGNOSIS — Z23 ENCOUNTER FOR IMMUNIZATION: ICD-10-CM

## 2024-10-07 DIAGNOSIS — I25.10 ATHEROSCLEROTIC HEART DISEASE OF NATIVE CORONARY ARTERY WITHOUT ANGINA PECTORIS: ICD-10-CM

## 2024-10-07 DIAGNOSIS — E11.9 TYPE 2 DIABETES MELLITUS WITHOUT COMPLICATIONS: ICD-10-CM

## 2024-10-07 DIAGNOSIS — N52.9 MALE ERECTILE DYSFUNCTION, UNSPECIFIED: ICD-10-CM

## 2024-10-07 DIAGNOSIS — F17.210 NICOTINE DEPENDENCE, CIGARETTES, UNCOMPLICATED: ICD-10-CM

## 2024-10-07 DIAGNOSIS — Z00.00 ENCOUNTER FOR GENERAL ADULT MEDICAL EXAMINATION WITHOUT ABNORMAL FINDINGS: ICD-10-CM

## 2024-10-10 ENCOUNTER — OUTPATIENT (OUTPATIENT)
Dept: OUTPATIENT SERVICES | Facility: HOSPITAL | Age: 64
LOS: 1 days | End: 2024-10-10
Payer: SELF-PAY

## 2024-10-10 ENCOUNTER — APPOINTMENT (OUTPATIENT)
Dept: CARDIOLOGY | Facility: HOSPITAL | Age: 64
End: 2024-10-10

## 2024-10-10 ENCOUNTER — NON-APPOINTMENT (OUTPATIENT)
Age: 64
End: 2024-10-10

## 2024-10-10 VITALS — DIASTOLIC BLOOD PRESSURE: 69 MMHG | HEART RATE: 64 BPM | SYSTOLIC BLOOD PRESSURE: 121 MMHG | OXYGEN SATURATION: 100 %

## 2024-10-10 DIAGNOSIS — E11.9 TYPE 2 DIABETES MELLITUS W/OUT COMPLICATIONS: ICD-10-CM

## 2024-10-10 DIAGNOSIS — I25.10 ATHEROSCLEROTIC HEART DISEASE OF NATIVE CORONARY ARTERY W/OUT ANGINA PECTORIS: ICD-10-CM

## 2024-10-10 DIAGNOSIS — I25.10 ATHEROSCLEROTIC HEART DISEASE OF NATIVE CORONARY ARTERY WITHOUT ANGINA PECTORIS: ICD-10-CM

## 2024-10-10 DIAGNOSIS — I50.20 UNSPECIFIED SYSTOLIC (CONGESTIVE) HEART FAILURE: ICD-10-CM

## 2024-10-10 PROCEDURE — 93005 ELECTROCARDIOGRAM TRACING: CPT

## 2024-10-10 PROCEDURE — G0463: CPT

## 2024-10-15 ENCOUNTER — APPOINTMENT (OUTPATIENT)
Dept: INTERNAL MEDICINE | Facility: CLINIC | Age: 64
End: 2024-10-15

## 2024-12-09 ENCOUNTER — APPOINTMENT (OUTPATIENT)
Dept: INTERNAL MEDICINE | Facility: CLINIC | Age: 64
End: 2024-12-09
Payer: COMMERCIAL

## 2024-12-09 ENCOUNTER — OUTPATIENT (OUTPATIENT)
Dept: OUTPATIENT SERVICES | Facility: HOSPITAL | Age: 64
LOS: 1 days | End: 2024-12-09
Payer: SELF-PAY

## 2024-12-09 VITALS
BODY MASS INDEX: 22.5 KG/M2 | DIASTOLIC BLOOD PRESSURE: 60 MMHG | WEIGHT: 140 LBS | SYSTOLIC BLOOD PRESSURE: 104 MMHG | OXYGEN SATURATION: 99 % | HEIGHT: 66 IN | HEART RATE: 79 BPM

## 2024-12-09 DIAGNOSIS — N52.9 MALE ERECTILE DYSFUNCTION, UNSPECIFIED: ICD-10-CM

## 2024-12-09 DIAGNOSIS — E11.9 TYPE 2 DIABETES MELLITUS W/OUT COMPLICATIONS: ICD-10-CM

## 2024-12-09 DIAGNOSIS — I25.10 ATHEROSCLEROTIC HEART DISEASE OF NATIVE CORONARY ARTERY W/OUT ANGINA PECTORIS: ICD-10-CM

## 2024-12-09 DIAGNOSIS — I10 ESSENTIAL (PRIMARY) HYPERTENSION: ICD-10-CM

## 2024-12-09 DIAGNOSIS — I50.20 UNSPECIFIED SYSTOLIC (CONGESTIVE) HEART FAILURE: ICD-10-CM

## 2024-12-09 LAB — HBA1C MFR BLD HPLC: 6.2

## 2024-12-09 PROCEDURE — 99214 OFFICE O/P EST MOD 30 MIN: CPT | Mod: GC

## 2024-12-09 PROCEDURE — G0463: CPT

## 2024-12-09 PROCEDURE — 83036 HEMOGLOBIN GLYCOSYLATED A1C: CPT

## 2024-12-09 PROCEDURE — 80048 BASIC METABOLIC PNL TOTAL CA: CPT

## 2024-12-09 PROCEDURE — G2211 COMPLEX E/M VISIT ADD ON: CPT

## 2024-12-10 ENCOUNTER — NON-APPOINTMENT (OUTPATIENT)
Age: 64
End: 2024-12-10

## 2024-12-10 LAB
ANION GAP SERPL CALC-SCNC: 13 MMOL/L
BUN SERPL-MCNC: 10 MG/DL
CALCIUM SERPL-MCNC: 10.2 MG/DL
CHLORIDE SERPL-SCNC: 103 MMOL/L
CO2 SERPL-SCNC: 22 MMOL/L
CREAT SERPL-MCNC: 0.92 MG/DL
EGFR: 95 ML/MIN/1.73M2
GLUCOSE SERPL-MCNC: 98 MG/DL
POTASSIUM SERPL-SCNC: 4.3 MMOL/L
SODIUM SERPL-SCNC: 139 MMOL/L

## 2024-12-17 DIAGNOSIS — E11.9 TYPE 2 DIABETES MELLITUS WITHOUT COMPLICATIONS: ICD-10-CM

## 2024-12-17 DIAGNOSIS — I50.20 UNSPECIFIED SYSTOLIC (CONGESTIVE) HEART FAILURE: ICD-10-CM

## 2024-12-17 DIAGNOSIS — I25.10 ATHEROSCLEROTIC HEART DISEASE OF NATIVE CORONARY ARTERY WITHOUT ANGINA PECTORIS: ICD-10-CM

## 2024-12-23 ENCOUNTER — OUTPATIENT (OUTPATIENT)
Dept: OUTPATIENT SERVICES | Facility: HOSPITAL | Age: 64
LOS: 1 days | End: 2024-12-23

## 2024-12-23 ENCOUNTER — APPOINTMENT (OUTPATIENT)
Dept: CV DIAGNOSITCS | Facility: HOSPITAL | Age: 64
End: 2024-12-23

## 2024-12-23 DIAGNOSIS — I25.10 ATHEROSCLEROTIC HEART DISEASE OF NATIVE CORONARY ARTERY WITHOUT ANGINA PECTORIS: ICD-10-CM

## 2024-12-23 DIAGNOSIS — I50.20 UNSPECIFIED SYSTOLIC (CONGESTIVE) HEART FAILURE: ICD-10-CM

## 2024-12-23 DIAGNOSIS — E11.9 TYPE 2 DIABETES MELLITUS WITHOUT COMPLICATIONS: ICD-10-CM

## 2025-01-09 ENCOUNTER — OUTPATIENT (OUTPATIENT)
Dept: OUTPATIENT SERVICES | Facility: HOSPITAL | Age: 65
LOS: 1 days | End: 2025-01-09
Payer: SELF-PAY

## 2025-01-09 ENCOUNTER — NON-APPOINTMENT (OUTPATIENT)
Age: 65
End: 2025-01-09

## 2025-01-09 ENCOUNTER — APPOINTMENT (OUTPATIENT)
Dept: CARDIOLOGY | Facility: HOSPITAL | Age: 65
End: 2025-01-09

## 2025-01-09 VITALS
SYSTOLIC BLOOD PRESSURE: 116 MMHG | WEIGHT: 140 LBS | OXYGEN SATURATION: 100 % | BODY MASS INDEX: 22.6 KG/M2 | DIASTOLIC BLOOD PRESSURE: 66 MMHG | HEART RATE: 76 BPM

## 2025-01-09 DIAGNOSIS — I50.20 UNSPECIFIED SYSTOLIC (CONGESTIVE) HEART FAILURE: ICD-10-CM

## 2025-01-09 DIAGNOSIS — I25.10 ATHEROSCLEROTIC HEART DISEASE OF NATIVE CORONARY ARTERY W/OUT ANGINA PECTORIS: ICD-10-CM

## 2025-01-09 DIAGNOSIS — I25.10 ATHEROSCLEROTIC HEART DISEASE OF NATIVE CORONARY ARTERY WITHOUT ANGINA PECTORIS: ICD-10-CM

## 2025-01-09 PROCEDURE — G0463: CPT

## 2025-01-09 PROCEDURE — 93005 ELECTROCARDIOGRAM TRACING: CPT

## 2025-01-09 RX ORDER — ATORVASTATIN CALCIUM 80 MG/1
80 TABLET, FILM COATED ORAL
Refills: 0 | Status: ACTIVE | COMMUNITY

## 2025-01-10 DIAGNOSIS — I50.20 UNSPECIFIED SYSTOLIC (CONGESTIVE) HEART FAILURE: ICD-10-CM

## 2025-01-27 ENCOUNTER — INPATIENT (INPATIENT)
Facility: HOSPITAL | Age: 65
LOS: 1 days | Discharge: HOME | DRG: 378 | End: 2025-01-29
Attending: FAMILY MEDICINE | Admitting: STUDENT IN AN ORGANIZED HEALTH CARE EDUCATION/TRAINING PROGRAM
Payer: MEDICAID

## 2025-01-27 VITALS
SYSTOLIC BLOOD PRESSURE: 166 MMHG | DIASTOLIC BLOOD PRESSURE: 90 MMHG | RESPIRATION RATE: 19 BRPM | HEIGHT: 65 IN | HEART RATE: 99 BPM | WEIGHT: 145.06 LBS | TEMPERATURE: 98 F | OXYGEN SATURATION: 99 %

## 2025-01-27 LAB
ALBUMIN SERPL ELPH-MCNC: 3.5 G/DL — SIGNIFICANT CHANGE UP (ref 3.3–5)
ALP SERPL-CCNC: 85 U/L — SIGNIFICANT CHANGE UP (ref 40–120)
ALT FLD-CCNC: 13 U/L — SIGNIFICANT CHANGE UP (ref 12–78)
ANION GAP SERPL CALC-SCNC: 8 MMOL/L — SIGNIFICANT CHANGE UP (ref 5–17)
APTT BLD: 23.2 SEC — LOW (ref 24.5–35.6)
AST SERPL-CCNC: 20 U/L — SIGNIFICANT CHANGE UP (ref 15–37)
BILIRUB SERPL-MCNC: 0.2 MG/DL — SIGNIFICANT CHANGE UP (ref 0.2–1.2)
BUN SERPL-MCNC: 13 MG/DL — SIGNIFICANT CHANGE UP (ref 7–23)
CALCIUM SERPL-MCNC: 9.1 MG/DL — SIGNIFICANT CHANGE UP (ref 8.5–10.1)
CHLORIDE SERPL-SCNC: 104 MMOL/L — SIGNIFICANT CHANGE UP (ref 96–108)
CO2 SERPL-SCNC: 21 MMOL/L — LOW (ref 22–31)
CREAT SERPL-MCNC: 0.91 MG/DL — SIGNIFICANT CHANGE UP (ref 0.5–1.3)
EGFR: 95 ML/MIN/1.73M2 — SIGNIFICANT CHANGE UP
GLUCOSE SERPL-MCNC: 131 MG/DL — HIGH (ref 70–99)
HCT VFR BLD CALC: 15.4 % — CRITICAL LOW (ref 39–50)
HGB BLD-MCNC: 4.8 G/DL — CRITICAL LOW (ref 13–17)
INR BLD: 1.01 RATIO — SIGNIFICANT CHANGE UP (ref 0.85–1.16)
LIDOCAIN IGE QN: 45 U/L — SIGNIFICANT CHANGE UP (ref 13–75)
MCHC RBC-ENTMCNC: 21.2 PG — LOW (ref 27–34)
MCHC RBC-ENTMCNC: 31.2 G/DL — LOW (ref 32–36)
MCV RBC AUTO: 68.1 FL — LOW (ref 80–100)
PLATELET # BLD AUTO: 519 K/UL — HIGH (ref 150–400)
POTASSIUM SERPL-MCNC: 3.8 MMOL/L — SIGNIFICANT CHANGE UP (ref 3.5–5.3)
POTASSIUM SERPL-SCNC: 3.8 MMOL/L — SIGNIFICANT CHANGE UP (ref 3.5–5.3)
PROT SERPL-MCNC: 7.3 G/DL — SIGNIFICANT CHANGE UP (ref 6–8.3)
PROTHROM AB SERPL-ACNC: 11.8 SEC — SIGNIFICANT CHANGE UP (ref 9.9–13.4)
RBC # BLD: 2.26 M/UL — LOW (ref 4.2–5.8)
RBC # FLD: 20.5 % — HIGH (ref 10.3–14.5)
SODIUM SERPL-SCNC: 133 MMOL/L — LOW (ref 135–145)
TROPONIN I, HIGH SENSITIVITY RESULT: 6.6 NG/L — SIGNIFICANT CHANGE UP
WBC # BLD: 12.26 K/UL — HIGH (ref 3.8–10.5)
WBC # FLD AUTO: 12.26 K/UL — HIGH (ref 3.8–10.5)

## 2025-01-27 PROCEDURE — 99285 EMERGENCY DEPT VISIT HI MDM: CPT

## 2025-01-27 PROCEDURE — 93010 ELECTROCARDIOGRAM REPORT: CPT

## 2025-01-27 PROCEDURE — 71045 X-RAY EXAM CHEST 1 VIEW: CPT | Mod: 26

## 2025-01-27 RX ORDER — BACTERIOSTATIC SODIUM CHLORIDE 0.9 %
1000 VIAL (ML) INJECTION ONCE
Refills: 0 | Status: COMPLETED | OUTPATIENT
Start: 2025-01-27 | End: 2025-01-27

## 2025-01-27 RX ORDER — ACETAMINOPHEN 160 MG/5ML
1000 SUSPENSION ORAL ONCE
Refills: 0 | Status: COMPLETED | OUTPATIENT
Start: 2025-01-27 | End: 2025-01-27

## 2025-01-27 RX ORDER — MORPHINE SULFATE 60 MG/1
4 TABLET, FILM COATED, EXTENDED RELEASE ORAL ONCE
Refills: 0 | Status: DISCONTINUED | OUTPATIENT
Start: 2025-01-27 | End: 2025-01-27

## 2025-01-27 RX ADMIN — Medication 1000 MILLILITER(S): at 23:05

## 2025-01-27 RX ADMIN — MORPHINE SULFATE 4 MILLIGRAM(S): 60 TABLET, FILM COATED, EXTENDED RELEASE ORAL at 23:05

## 2025-01-27 RX ADMIN — ACETAMINOPHEN 400 MILLIGRAM(S): 160 SUSPENSION ORAL at 23:05

## 2025-01-27 NOTE — ED PROVIDER NOTE - CLINICAL SUMMARY MEDICAL DECISION MAKING FREE TEXT BOX
Joie ATTG     63 y/o male with PMHX HTN, HLD, CAD (on ASA and Plavix), and DM presents today due to epigastric abdominal pain. pt admits to mild nausea with the pain. pt describes the pain to epigastric region as aching, non-radiating, and currently 8/10. pt denies vomiting, chest pain, fever, shortness of breath, hematochezia , melena, or any other complaints.    during course pt found to have severe anemia dissection study ordered given anemia and back pain

## 2025-01-27 NOTE — ED PROVIDER NOTE - PROGRESS NOTE DETAILS
Lab called noting anemia. Pt without hx of anemia. Pt admits to some dark stool but no black stool. CTA r/o dissection ordered, CT tech made aware critical and to be performed immediately. CT noting PUD. Hospitalist accepting

## 2025-01-27 NOTE — ED PROVIDER NOTE - OBJECTIVE STATEMENT
65 y/o male with PMHX HTN, HLD, CAD (on ASA and Plavix), and DM presents today due to epigastric abdominal pain. pt admits to mild nausea with the pain. pt describes the pain to epigastric region as aching, non-radiating, and currently 8/10. pt denies vomiting, chest pain, fever, shortness of breath, hematochezia , melena, or any other complaints.

## 2025-01-27 NOTE — ED PROVIDER NOTE - PHYSICAL EXAMINATION
Constitutional: Awake, Alert, non-toxic. NAD. Well appearing, well nourished.   HEAD: Normocephalic, atraumatic.   EYES: EOM intact, conjunctiva and sclera are clear bilaterally.   ENT: No rhinorrhea, patent, mucous membranes pink/moist, no drooling or stridor.   NECK: Supple, non-tender  CARDIOVASCULAR: Normal S1, S2; regular rate and rhythm.  RESPIRATORY: Normal respiratory effort; breath sounds CTAB, no wheezes, rhonchi, or rales. Speaking in full sentences. No accessory muscle use.   ABDOMEN: Soft; (+) mild epigastric and RUQ TTP, no guarding or rebound TTP.   EXTREMITIES: Full passive and active ROM in all extremities; non-tender to palpation; distal pulses palpable and symmetric  SKIN: Warm, dry; good skin turgor, no apparent lesions or rashes, no ecchymosis, brisk capillary refill.  NEURO: A&O x3. Sensory and motor functions are grossly intact. Speech is normal. Appearance and judgement seem appropriate for gender and age.

## 2025-01-27 NOTE — ED PROVIDER NOTE - ATTENDING APP SHARED VISIT CONTRIBUTION OF CARE
Joie ATTG See MDM I performed a history and physical exam of the patient and discussed their management with the Physician assistant reviewed the PAs note and agree with the documented findings and plan of care. My medical decision making and observations are found above.

## 2025-01-28 ENCOUNTER — TRANSCRIPTION ENCOUNTER (OUTPATIENT)
Age: 65
End: 2025-01-28

## 2025-01-28 DIAGNOSIS — Z29.9 ENCOUNTER FOR PROPHYLACTIC MEASURES, UNSPECIFIED: ICD-10-CM

## 2025-01-28 DIAGNOSIS — K27.9 PEPTIC ULCER, SITE UNSPECIFIED, UNSPECIFIED AS ACUTE OR CHRONIC, WITHOUT HEMORRHAGE OR PERFORATION: ICD-10-CM

## 2025-01-28 DIAGNOSIS — I25.10 ATHEROSCLEROTIC HEART DISEASE OF NATIVE CORONARY ARTERY WITHOUT ANGINA PECTORIS: ICD-10-CM

## 2025-01-28 DIAGNOSIS — E11.9 TYPE 2 DIABETES MELLITUS WITHOUT COMPLICATIONS: ICD-10-CM

## 2025-01-28 DIAGNOSIS — I10 ESSENTIAL (PRIMARY) HYPERTENSION: ICD-10-CM

## 2025-01-28 DIAGNOSIS — I50.20 UNSPECIFIED SYSTOLIC (CONGESTIVE) HEART FAILURE: ICD-10-CM

## 2025-01-28 DIAGNOSIS — D64.9 ANEMIA, UNSPECIFIED: ICD-10-CM

## 2025-01-28 LAB
ANION GAP SERPL CALC-SCNC: 4 MMOL/L — LOW (ref 5–17)
BASOPHILS # BLD AUTO: 0.03 K/UL — SIGNIFICANT CHANGE UP (ref 0–0.2)
BASOPHILS NFR BLD AUTO: 0.2 % — SIGNIFICANT CHANGE UP (ref 0–2)
BUN SERPL-MCNC: 9 MG/DL — SIGNIFICANT CHANGE UP (ref 7–23)
CALCIUM SERPL-MCNC: 8.3 MG/DL — LOW (ref 8.5–10.1)
CHLORIDE SERPL-SCNC: 113 MMOL/L — HIGH (ref 96–108)
CO2 SERPL-SCNC: 23 MMOL/L — SIGNIFICANT CHANGE UP (ref 22–31)
CREAT SERPL-MCNC: 0.87 MG/DL — SIGNIFICANT CHANGE UP (ref 0.5–1.3)
EGFR: 98 ML/MIN/1.73M2 — SIGNIFICANT CHANGE UP
EOSINOPHIL # BLD AUTO: 0.04 K/UL — SIGNIFICANT CHANGE UP (ref 0–0.5)
EOSINOPHIL NFR BLD AUTO: 0.3 % — SIGNIFICANT CHANGE UP (ref 0–6)
GLUCOSE SERPL-MCNC: 118 MG/DL — HIGH (ref 70–99)
HCT VFR BLD CALC: 19.8 % — CRITICAL LOW (ref 39–50)
HCT VFR BLD CALC: 24.6 % — LOW (ref 39–50)
HGB BLD-MCNC: 6.5 G/DL — CRITICAL LOW (ref 13–17)
HGB BLD-MCNC: 7.9 G/DL — LOW (ref 13–17)
IMM GRANULOCYTES NFR BLD AUTO: 1.4 % — HIGH (ref 0–0.9)
LYMPHOCYTES # BLD AUTO: 1.68 K/UL — SIGNIFICANT CHANGE UP (ref 1–3.3)
LYMPHOCYTES # BLD AUTO: 13.7 % — SIGNIFICANT CHANGE UP (ref 13–44)
MONOCYTES # BLD AUTO: 0.91 K/UL — HIGH (ref 0–0.9)
MONOCYTES NFR BLD AUTO: 7.4 % — SIGNIFICANT CHANGE UP (ref 2–14)
NEUTROPHILS # BLD AUTO: 9.43 K/UL — HIGH (ref 1.8–7.4)
NEUTROPHILS NFR BLD AUTO: 77 % — SIGNIFICANT CHANGE UP (ref 43–77)
NRBC # BLD: 0 /100 WBCS — SIGNIFICANT CHANGE UP (ref 0–0)
NRBC BLD-RTO: 0 /100 WBCS — SIGNIFICANT CHANGE UP (ref 0–0)
OB PNL STL: POSITIVE
POTASSIUM SERPL-MCNC: 3.3 MMOL/L — LOW (ref 3.5–5.3)
POTASSIUM SERPL-SCNC: 3.3 MMOL/L — LOW (ref 3.5–5.3)
SODIUM SERPL-SCNC: 140 MMOL/L — SIGNIFICANT CHANGE UP (ref 135–145)

## 2025-01-28 PROCEDURE — 71275 CT ANGIOGRAPHY CHEST: CPT | Mod: 26

## 2025-01-28 PROCEDURE — 99223 1ST HOSP IP/OBS HIGH 75: CPT | Mod: GC

## 2025-01-28 PROCEDURE — 76705 ECHO EXAM OF ABDOMEN: CPT | Mod: 26

## 2025-01-28 PROCEDURE — 74174 CTA ABD&PLVS W/CONTRAST: CPT | Mod: 26

## 2025-01-28 PROCEDURE — 99223 1ST HOSP IP/OBS HIGH 75: CPT

## 2025-01-28 RX ORDER — LOSARTAN POTASSIUM 100 MG
25 TABLET ORAL DAILY
Refills: 0 | Status: DISCONTINUED | OUTPATIENT
Start: 2025-01-28 | End: 2025-01-29

## 2025-01-28 RX ORDER — ATORVASTATIN CALCIUM 80 MG/1
80 TABLET, FILM COATED ORAL AT BEDTIME
Refills: 0 | Status: DISCONTINUED | OUTPATIENT
Start: 2025-01-28 | End: 2025-01-29

## 2025-01-28 RX ORDER — SODIUM CHLORIDE 9 G/ML
1000 INJECTION, SOLUTION INTRAVENOUS
Refills: 0 | Status: DISCONTINUED | OUTPATIENT
Start: 2025-01-28 | End: 2025-01-29

## 2025-01-28 RX ORDER — INSULIN LISPRO 100/ML
VIAL (ML) SUBCUTANEOUS AT BEDTIME
Refills: 0 | Status: DISCONTINUED | OUTPATIENT
Start: 2025-01-28 | End: 2025-01-28

## 2025-01-28 RX ORDER — DM/PSEUDOEPHED/ACETAMINOPHEN 10-30-250
25 CAPSULE ORAL ONCE
Refills: 0 | Status: DISCONTINUED | OUTPATIENT
Start: 2025-01-28 | End: 2025-01-29

## 2025-01-28 RX ORDER — PANTOPRAZOLE 20 MG/1
8 TABLET, DELAYED RELEASE ORAL
Qty: 80 | Refills: 0 | Status: DISCONTINUED | OUTPATIENT
Start: 2025-01-28 | End: 2025-01-28

## 2025-01-28 RX ORDER — POTASSIUM CHLORIDE 750 MG/1
40 TABLET, EXTENDED RELEASE ORAL EVERY 4 HOURS
Refills: 0 | Status: COMPLETED | OUTPATIENT
Start: 2025-01-28 | End: 2025-01-28

## 2025-01-28 RX ORDER — DM/PSEUDOEPHED/ACETAMINOPHEN 10-30-250
15 CAPSULE ORAL ONCE
Refills: 0 | Status: DISCONTINUED | OUTPATIENT
Start: 2025-01-28 | End: 2025-01-29

## 2025-01-28 RX ORDER — GLUCAGON 3 MG/1
1 POWDER NASAL ONCE
Refills: 0 | Status: DISCONTINUED | OUTPATIENT
Start: 2025-01-28 | End: 2025-01-29

## 2025-01-28 RX ORDER — LOSARTAN POTASSIUM 100 MG
1 TABLET ORAL
Refills: 0 | DISCHARGE

## 2025-01-28 RX ORDER — METFORMIN HYDROCHLORIDE 1000 MG/1
1 TABLET, COATED ORAL
Refills: 0 | DISCHARGE

## 2025-01-28 RX ORDER — METOPROLOL SUCCINATE 25 MG
1 TABLET, EXTENDED RELEASE 24 HR ORAL
Refills: 0 | DISCHARGE

## 2025-01-28 RX ORDER — INSULIN LISPRO 100/ML
VIAL (ML) SUBCUTANEOUS EVERY 6 HOURS
Refills: 0 | Status: DISCONTINUED | OUTPATIENT
Start: 2025-01-28 | End: 2025-01-29

## 2025-01-28 RX ORDER — PANTOPRAZOLE 20 MG/1
40 TABLET, DELAYED RELEASE ORAL ONCE
Refills: 0 | Status: COMPLETED | OUTPATIENT
Start: 2025-01-28 | End: 2025-01-28

## 2025-01-28 RX ORDER — METOPROLOL SUCCINATE 25 MG
50 TABLET, EXTENDED RELEASE 24 HR ORAL DAILY
Refills: 0 | Status: DISCONTINUED | OUTPATIENT
Start: 2025-01-28 | End: 2025-01-29

## 2025-01-28 RX ORDER — DAPAGLIFLOZIN 5 MG/1
1 TABLET, FILM COATED ORAL
Refills: 0 | DISCHARGE

## 2025-01-28 RX ORDER — DAPAGLIFLOZIN 5 MG/1
10 TABLET, FILM COATED ORAL DAILY
Refills: 0 | Status: DISCONTINUED | OUTPATIENT
Start: 2025-01-28 | End: 2025-01-28

## 2025-01-28 RX ORDER — INSULIN LISPRO 100/ML
VIAL (ML) SUBCUTANEOUS
Refills: 0 | Status: DISCONTINUED | OUTPATIENT
Start: 2025-01-28 | End: 2025-01-28

## 2025-01-28 RX ORDER — PANTOPRAZOLE 20 MG/1
40 TABLET, DELAYED RELEASE ORAL EVERY 12 HOURS
Refills: 0 | Status: DISCONTINUED | OUTPATIENT
Start: 2025-01-28 | End: 2025-01-29

## 2025-01-28 RX ORDER — BACTERIOSTATIC SODIUM CHLORIDE 0.9 %
1000 VIAL (ML) INJECTION
Refills: 0 | Status: DISCONTINUED | OUTPATIENT
Start: 2025-01-28 | End: 2025-01-29

## 2025-01-28 RX ADMIN — Medication 60 MILLILITER(S): at 07:30

## 2025-01-28 RX ADMIN — ATORVASTATIN CALCIUM 80 MILLIGRAM(S): 80 TABLET, FILM COATED ORAL at 22:33

## 2025-01-28 RX ADMIN — PANTOPRAZOLE 40 MILLIGRAM(S): 20 TABLET, DELAYED RELEASE ORAL at 17:00

## 2025-01-28 RX ADMIN — PANTOPRAZOLE 40 MILLIGRAM(S): 20 TABLET, DELAYED RELEASE ORAL at 03:40

## 2025-01-28 RX ADMIN — PANTOPRAZOLE 10 MG/HR: 20 TABLET, DELAYED RELEASE ORAL at 07:30

## 2025-01-28 RX ADMIN — POTASSIUM CHLORIDE 40 MILLIEQUIVALENT(S): 750 TABLET, EXTENDED RELEASE ORAL at 22:32

## 2025-01-28 RX ADMIN — POTASSIUM CHLORIDE 40 MILLIEQUIVALENT(S): 750 TABLET, EXTENDED RELEASE ORAL at 17:20

## 2025-01-28 NOTE — CARE COORDINATION ASSESSMENT. - NSCAREPROVIDERS_GEN_ALL_CORE_FT
CARE PROVIDERS:  Accepting Physician: Nyla Gale  Administration: Sanjuanita Drummond  Administration: Blu Peoples  Admitting: Nyla Gale  Attending: Jay Waller  Consultant: Amari Myers  Consultant: Alfred Quiñones  Consultant: Weil, Patricia  Consultant: Maria G Summers  Consultant: Ninfa Robison  Consultant: Tao Ocampo  Consultant: Nyla Reece  Covering Nurse: Sanjuanita Salinas  ED ACP: Elton Giang  ED Attending: Terrence Salter  ED Attending2: Zohaib Jeronimo  ED Nurse: Liv Worrell  Nurse: Olaf Torrez  Nurse: Paige Sherwood  Nurse: Joshua Rivera  Nurse: Patty Casey  Nurse: Kalyani Perry  Nurse: Liv Worrell  Ordered: Doctor, Unknown  Override: Jack Vaughan  Override: Olaf Torrez  Primary Team: Nyla Gale  Primary Team: Bairon Hernandez  Primary Team: Ana Cristina Lopez  Primary Team: Zohaib Cosme  Primary Team: Carina Burgos  Primary Team: Elton Giang  Primary Team: Arline Melo  : Scarlett Kaufman  : Inessa Bey  Student: Vandana Mccullough  Team: PLV  Hospitalists, Team  UR// Supp. Assoc.: Delfina Ross

## 2025-01-28 NOTE — CARE COORDINATION ASSESSMENT. - LIVING ARRANGEMENTS/HOME SAFETY CONCERNS
Problem: Adult Inpatient Plan of Care  Goal: Plan of Care Review  Outcome: Ongoing, Progressing  Goal: Patient-Specific Goal (Individualized)  Outcome: Ongoing, Progressing  Goal: Absence of Hospital-Acquired Illness or Injury  Outcome: Ongoing, Progressing  Goal: Optimal Comfort and Wellbeing  Outcome: Ongoing, Progressing  Goal: Readiness for Transition of Care  Outcome: Ongoing, Progressing     Problem: Fall Injury Risk  Goal: Absence of Fall and Fall-Related Injury  Outcome: Ongoing, Progressing     Problem: Skin Injury Risk Increased  Goal: Skin Health and Integrity  Outcome: Ongoing, Progressing      no concerns

## 2025-01-28 NOTE — H&P ADULT - PROBLEM SELECTOR PLAN 1
Pt with R upper abdominal pain and nausea x3 days  -Hgb 4.8 and Hct 15.4 on admission   -FOBT positive   -CT Angio A/P: No aortic dissection. No evidence of acute gastrointestinal hemorrhage. Proximal duodenal wall thickening/edema. Differential diagnostic considerations include duodenitis and peptic ulcer disease. Follow-up   upper endoscopy is recommended for further evaluation  -US Abdomen RUQ: No cholelithiasis or secondary signs of acute cholecystitis  -In ED given Ofirmev 1g IVPB x1, 1lNS bolus x1, Morphine 4mg IVP x1, 1 unit pRBCs, Protonix 40mg IVP x1  -GI consulted, Dr. Myers, f/u recs Pt with R upper abdominal pain and nausea x3 days  -Hgb 4.8 and Hct 15.4 on admission   -FOBT positive   -CT Angio A/P: No aortic dissection. No evidence of acute gastrointestinal hemorrhage. Proximal duodenal wall thickening/edema. Differential diagnostic considerations include duodenitis and peptic ulcer disease. Follow-up   upper endoscopy is recommended for further evaluation  -US Abdomen RUQ: No cholelithiasis or secondary signs of acute cholecystitis  -In ED given Ofirmev 1g IVPB x1, 1lNS bolus x1, Morphine 4mg IVP x1, 1 unit pRBCs, Protonix 40mg IVP x1  -GI consulted, Dr. Myers, f/u recs  -Monitor H&H Pt with R upper abdominal pain and nausea x3 days  -Hgb 4.8 and Hct 15.4 on admission   -FOBT positive   -CT Angio A/P: No aortic dissection. No evidence of acute gastrointestinal hemorrhage. Proximal duodenal wall thickening/edema. Differential diagnostic considerations include duodenitis and peptic ulcer disease. Follow-up   upper endoscopy is recommended for further evaluation  -US Abdomen RUQ: No cholelithiasis or secondary signs of acute cholecystitis  -In ED given Ofirmev 1g IVPB x1, 1lNS bolus x1, Morphine 4mg IVP x1, 1 unit pRBCs, Protonix 40mg IVP x1  -GI consulted, Dr. Myers, f/u recs  -Monitor H&H  -Suspect peptic ulcer disease  -C/w protonix drip  -NPO for possible endoscopy today  -Gentle IVF 60cc/hr for12 hr while NPO  -Cardio consult for clearance Pt with R upper abdominal pain and nausea x3 days  -Hgb 4.8 and Hct 15.4 on admission   -FOBT positive   -CT Angio A/P: No aortic dissection. No evidence of acute gastrointestinal hemorrhage. Proximal duodenal wall thickening/edema. Differential diagnostic considerations include duodenitis and peptic ulcer disease. Follow-up   upper endoscopy is recommended for further evaluation  -US Abdomen RUQ: No cholelithiasis or secondary signs of acute cholecystitis  -In ED given Ofirmev 1g IVPB x1, 1lNS bolus x1, Morphine 4mg IVP x1, 1 unit pRBCs, Protonix 40mg IVP x1  -GI consulted, Dr. Myers, f/u recs  -Monitor H&H  -Suspect peptic ulcer disease  -C/w protonix drip  -NPO for possible endoscopy today  -Gentle IVF 60cc/hr for12 hr while NPO  -Cardio consult for clearance, [facility] f/u recs Pt with R upper abdominal pain and nausea x3 days  -Hgb 4.8 and Hct 15.4 on admission   -FOBT positive   -CT Angio A/P: No aortic dissection. No evidence of acute gastrointestinal hemorrhage. Proximal duodenal wall thickening/edema. Differential diagnostic considerations include duodenitis and peptic ulcer disease. Follow-up   upper endoscopy is recommended for further evaluation  -US Abdomen RUQ: No cholelithiasis or secondary signs of acute cholecystitis  -In ED given Ofirmev 1g IVPB x1, 1lNS bolus x1, Morphine 4mg IVP x1, 1 unit pRBCs, Protonix 40mg IVP x1  -GI consulted, Dr. Myers, f/u recs  -Monitor H&H  -Suspect peptic ulcer disease  -C/w protonix drip  -NPO for possible endoscopy  -Gentle IVF 60cc/hr for12 hr while NPO  -Cardio consult for clearance, [facility] f/u recs

## 2025-01-28 NOTE — CONSULT NOTE ADULT - ASSESSMENT
Anemia  Melena  FOBT+  Abnormal CT   Hx CAD s/p PCI with 2 stents (on ASA and Plavix)    Initial Hgb 4.8, s/p 2u PRBC  Awaiting AM Hgb   Last dose of Plavix 1/26 AM    Recommendations:   - CT AP noted & d/w pt; no evidence of acute GI hemorrhage, proximal duodenal wall thickening/edema  - Planned for EGD tomorrow, 1/29  - Given cardiac history, will need cardiology clearance prior to procedure  - Clear liquids okay   - NPO p MN  - PPI BID   - Hold AC   - Trend CBC, transfuse PRN for Hgb <8  - Active T&S   - 2 large bore IV's  - Avoid NSAIDs  - RUQ US and LFTs normal, d/w patient  - Will need outpatient GI follow up for screening colonoscopy once discharged       I reviewed the overnight course of events on the unit, re-confirming the patient history. I discussed the care with the patient  Differential diagnosis and plan of care discussed with patient after the evaluation  35 minutes spent on total encounter of which more than fifty percent of the encounter was spent counseling and/or coordinating care by the attending physician.
Pt is a 63 y/o male with PMHX HTN, HLD, HFrEF, CAD s/p PCI with 2 stents (on ASA and Plavix), and DM presents today due to epigastric abdominal pain and melena.     #Anemia  - Hb of 4.8 on presentation 2/2 GIB  - pt on DAPT w/ ASA and plavix S/P PCI  - S/P 2U pRBC, no signs of transfusion reactions  - hold AC for now  - consider ASA only once medically optimized  - F/U H/H   - GI consult for EGD, being planned for tomorrow     #Surgical Clearance   - Planned for an EGD 1/29/25  - EKG: NSR  - Social: denies smoking  - Self-reported physical activity: no limitation walking on flat ground or stairs  - Assessment: Mets >4. RCRI - Class 2 risk, indicating 10.1% 30-day risk of death, MI, or cardiac arrest     #Volume Status   - no clinical signs of volume OL S/P Transfusion w/ 2U pRBC   - Echo from 08/23 with LVEF 43%  - repeat TTE, follow up results   - Strict I/Os, daily weights    #Ischemia   - No clear evidence of acute ischemia  - Troponin negative   - EKG: NSR  - Hx of CAD: hold AC in the setting of GIB  - continue statin  - Continue to monitor for signs or symptoms of ischemia     #Arrhythmia  - EKG: NSR, unchanged from prior EKG  - Monitor and replete lytes, keep K>4, Mg>2.    #HTN  - BP initially 166/90 then to 106/62  - on home HFrEF GDMT meds: Losartan, Metoprolol Succ, spironolactone and farxiga  - Continue home meds as BP tolerates  - Continue to monitor hemodynamics     - Other cardiovascular workup will depend on clinical course.  - All other workup per primary team.  - Will continue to follow.

## 2025-01-28 NOTE — ED ADULT NURSE NOTE - IS THE PATIENT ABLE TO BE SCREENED?
Message  received from Nikole from Home care calls and states she has called 3 times and has not received a return call, she would like to report on Marly, see if she came to her appt. She states pt is noncompliant. I have never received a phone call from Nikole.   no Yes

## 2025-01-28 NOTE — H&P ADULT - NSHPPHYSICALEXAM_GEN_ALL_CORE
T(C): 36.7 (01-27-25 @ 21:38), Max: 36.7 (01-27-25 @ 21:38)  HR: 99 (01-27-25 @ 21:38) (99 - 99)  BP: 166/90 (01-27-25 @ 21:38) (166/90 - 166/90)  RR: 19 (01-27-25 @ 21:38) (19 - 19)  SpO2: 99% (01-27-25 @ 21:38) (99% - 99%)    GENERAL: patient appears well, no acute distress, appropriately interactive  EYES: sclera clear, no exudates  ENMT: oropharynx clear without erythema, no exudates, moist mucous membranes  NECK: supple, soft  LUNGS: good air entry bilaterally, clear to auscultation, symmetric breath sounds, no wheezing or rhonchi appreciated  HEART: soft S1/S2, regular rate and rhythm, no murmurs noted, no lower extremity edema  GASTROINTESTINAL: abdomen is soft, nontender, nondistended, normoactive bowel sounds  INTEGUMENT: good skin turgor, warm skin, appears well perfused  MUSCULOSKELETAL: no clubbing or cyanosis, no obvious deformity  NEUROLOGIC: awake, alert, oriented x3, good muscle tone in all 4 extremities  HEME/LYMPH: no obvious ecchymosis or petechiae T(C): 36.7 (01-27-25 @ 21:38), Max: 36.7 (01-27-25 @ 21:38)  HR: 99 (01-27-25 @ 21:38) (99 - 99)  BP: 166/90 (01-27-25 @ 21:38) (166/90 - 166/90)  RR: 19 (01-27-25 @ 21:38) (19 - 19)  SpO2: 99% (01-27-25 @ 21:38) (99% - 99%)    GENERAL: NAD resting comfortably in bed, awake and alert   EYES: sclera clear, no exudates  ENMT: moist mucous membranes  NECK: supple, soft  LUNGS: good air entry bilaterally, clear to auscultation, no wheezing or rhonchi appreciated  HEART: soft S1/S2, RRR, no murmurs noted, no lower extremity edema  GASTROINTESTINAL: abdomen is soft, +TTP RUQ no guarding or rebound tenderness  INTEGUMENT: good skin turgor, warm skin, appears well perfused  MUSCULOSKELETAL: no clubbing or cyanosis, no obvious deformity  NEUROLOGIC: Axox3, answers questions appropriately   HEME/LYMPH: no obvious ecchymosis or petechiae T(C): 36.7 (01-27-25 @ 21:38), Max: 36.7 (01-27-25 @ 21:38)  HR: 99 (01-27-25 @ 21:38) (99 - 99)  BP: 166/90 (01-27-25 @ 21:38) (166/90 - 166/90)  RR: 19 (01-27-25 @ 21:38) (19 - 19)  SpO2: 99% (01-27-25 @ 21:38) (99% - 99%)  GENERAL: NAD resting comfortably in bed, awake and alert   EYES: sclera clear, no exudates  ENMT: moist mucous membranes  NECK: supple, soft  LUNGS: good air entry bilaterally, clear to auscultation, no wheezing or rhonchi appreciated  HEART: soft S1/S2, RRR, no murmurs noted, no lower extremity edema  GASTROINTESTINAL: abdomen is soft, +TTP RUQ no guarding or rebound tenderness  INTEGUMENT: good skin turgor, warm skin, appears well perfused  MUSCULOSKELETAL: no clubbing or cyanosis, no obvious deformity  NEUROLOGIC: Axox3, answers questions appropriately   HEME/LYMPH: no obvious ecchymosis or petechiae

## 2025-01-28 NOTE — CARE COORDINATION ASSESSMENT. - NSPASTMEDSURGHISTORY_GEN_ALL_CORE_FT
PAST MEDICAL & SURGICAL HISTORY:  Diabetes      CAD (coronary artery disease)      No significant past surgical history      STEMI (ST elevation myocardial infarction)      HFrEF (heart failure with reduced ejection fraction)

## 2025-01-28 NOTE — PROGRESS NOTE ADULT - PROBLEM SELECTOR PLAN 2
Pt with hx of DM  -On home Metformin  -Hold  home meds  -Start LDSS Fs q6h  -F/u AM a1c  -Hypoglycemia protocol

## 2025-01-28 NOTE — ED ADULT NURSE REASSESSMENT NOTE - NS ED NURSE REASSESS COMMENT FT1
0730: report received from previous shift RN. pt a&o x3, laying in stretcher, skin slightly pale for ethnicity, reports RUQ pain improving since arrival 7 tolerable at this time. pt ambulated to & back from bathroom with steady gait, stand by assist, denies dizziness. pt verbally denies any new/worsening complaints, including CP/palpitations, SOB, dizziness, blurry vision, N/V/D... IVF & protonix infusion initiated. respirations even/unlabored, no acute distress noted. comfort/safety maintained. will continue to monitor... pending 2nd PRBC infusion and telemetry admitted bed 0730: report received from previous shift RN. pt a&o x3, laying in stretcher, skin slightly pale for ethnicity, reports RUQ pain improving since arrival, tolerable at this time. pt ambulated to & back from bathroom with steady gait, stand by assist, denies dizziness. pt verbally denies any new/worsening complaints, including CP/palpitations, SOB, dizziness, blurry vision, N/V/D... IVF & protonix infusion initiated. respirations even/unlabored, no acute distress noted. comfort/safety maintained. will continue to monitor... pending 2nd PRBC infusion and telemetry admitted bed

## 2025-01-28 NOTE — H&P ADULT - ASSESSMENT
Pt is a 65 y/o male with PMHX HTN, HLD, HFrEF(?), CAD s/p PCI with 2 stents (on ASA and Plavix), and DM presents today due to epigastric abdominal pain admitted for anemia 2/2 peptic ulcer disease.  Pt is a 63 y/o male with PMHX HTN, HLD, HFrEF, CAD s/p PCI with 2 stents (on ASA and Plavix), and DM presents today due to epigastric abdominal pain admitted for anemia 2/2 peptic ulcer disease.   63 y/o male with PMHX HTN, HLD, HFrEF, CAD s/p PCI with 2 stents (on ASA and Plavix), and DM presents today due to epigastric abdominal pain admitted for anemia 2/2 peptic ulcer disease.

## 2025-01-28 NOTE — PROGRESS NOTE ADULT - ATTENDING COMMENTS
65 y/o male with PMHX HTN, HLD, HFrEF, CAD s/p PCI with 2 stents (on ASA and Plavix), and DM presents today due to epigastric abdominal pain admitted for anemia 2/2 peptic ulcer disease.     Patient seen and examined at bedside. States he is feeling better today. States he still has some slight discomfort in his RUQ of abdomen. Denies any chest pain, SOB, lightheadedness/dizziness at this time. Received 2 units PRBC, Hgb still low- will transfuse 3rd unit. Repeat Hgb tonight, if Hgb <8 would transfuse another unit PRBC and would also give lasix 20mg IV x1 after. Plan for EGD in AM. RCRI-2 patient is intermediate risk for emergent low risk procedure and is medically optimized. Cardiac clearance appreciated. NPO except meds after midnight.    Plan of care discussed with spouse at bedside.

## 2025-01-28 NOTE — ED ADULT NURSE NOTE - OBJECTIVE STATEMENT
pt a/o x 4 with a calm affect c/o upper right quadrant abdominal pain x 3 days.  patient denies chest pains at this time, pending initial lab work results and imaging,  rn will continue to monitor patient closely.

## 2025-01-28 NOTE — CONSULT NOTE ADULT - SUBJECTIVE AND OBJECTIVE BOX
[facility]    Porter Aguilar NP    [address] [place] [address]  [phone]      Chief Complaint:  Patient is a 64y old  Male who presents with a chief complaint of Peptic Ulcer Disease       HPI:  Pt is a 63 y/o male with PMHX HTN, HLD, HFrEF, CAD s/p PCI with 2 stents (on ASA and Plavix), and DM presents today due to epigastric abdominal pain. Pt states he has had worsening abdominal pain for about 1 week and noticed his stool was harder than usual and he was having more gas. Pt started to take Metamucil with some relief and started eating prunes. Pt denies any blood in his stool. Pt states that pain was only getting worse and on Saturday he was diaphoretic. Pt also took some Gas-X with some relief of symptoms. Today pt said he noticed his stool was darker than usual and his pain got worse and he was dizzy and nauseous so he came to the ED. Pt currently has no nausea or dizziness just admits to mild epigastric pain 4/10 improved from 8/10. Pt denies HA, diarrhea, chest pain, palpitations.       ED Course:   Vitals: BP:166/90 , HR:99, Temp:98F , RR:19, SpO2: 99% on RA  Labs: Na 133, Co2 21, Glucose 131, aPTT 23.2, WBC 12.26, Hgb 4.8, Hct 15.4, FOBT+  CT Angio A/P: No aortic dissection. No evidence of acute gastrointestinal hemorrhage. Proximal duodenal wall thickening/edema. Differential diagnostic considerations include duodenitis and peptic ulcer disease. Follow-up   upper endoscopy is recommended for further evaluation  US Abdomen RUQ: No cholelithiasis or secondary signs of acute cholecystitis.      Interval HPI:  Patient seen and examined at bedside  Discussed and confirmed history above  He reports having abdominal pain from constipation, took 1-2 tabs of Naproxen and Ibuprofen 800mg daily last Wednesday - Friday. After taking Metamucil, he initially had normal formed/brown stools but the past 2 days, noticed dark brown/black stools. Also had dizziness, SOB and near syncope at home. No hematemesis or hematochezia. This has never happened before. No prior history of anemia, blood transfusion or iron infusions.  He takes Plavix/ASA daily, last dose of Plavix was 1/26 AM. No prior history of EGD/colonoscopy. Today, he reports pain is improving. Pain is intermittent, located epigastric, radiating to lower abdomen and described as sharp. Unknown aggravating or alleviating factors. At home, pain was 10/10 and on my exam rates the pain 3/10. Denies fever, chills, nausea, vomiting, or dyspepsia. Current smoker, 2-3 cigarettes daily. Occasional ETOH, is a . Denies illicit drug use. No recent travel or sick contacts.         PAST MEDICAL & SURGICAL HISTORY:  CAD (coronary artery disease)      Diabetes      HFrEF (heart failure with reduced ejection fraction)      STEMI (ST elevation myocardial infarction)      No significant past surgical history          REVIEW OF SYSTEMS:   General: Negative  HEENT: Negative  CV: Negative  Respiratory: Negative  GI: See HPI  : Negative  MSK: Negative  Hematologic: Negative  Skin: Negative    MEDICATIONS:   MEDICATIONS  (STANDING):  atorvastatin 80 milliGRAM(s) Oral at bedtime  dextrose 5%. 1000 milliLiter(s) (50 mL/Hr) IV Continuous <Continuous>  dextrose 50% Injectable 25 Gram(s) IV Push once  glucagon  Injectable 1 milliGRAM(s) IntraMuscular once  insulin lispro (ADMELOG) corrective regimen sliding scale   SubCutaneous every 6 hours  losartan 25 milliGRAM(s) Oral daily  metoprolol succinate ER 50 milliGRAM(s) Oral daily  pantoprazole Infusion 8 mG/Hr (10 mL/Hr) IV Continuous <Continuous>  sodium chloride 0.9%. 1000 milliLiter(s) (60 mL/Hr) IV Continuous <Continuous>  spironolactone 25 milliGRAM(s) Oral daily    MEDICATIONS  (PRN):  dextrose Oral Gel 15 Gram(s) Oral once PRN Blood Glucose LESS THAN 70 milliGRAM(s)/deciliter  simethicone 80 milliGRAM(s) Chew three times a day PRN Gas      Packed Red Cells Order:  1 Unit  Indication: Hgb <7 gm/dL  Infuse Unit : 2.5 Hours (01-27-25 @ 23:42)  Packed Red Cells Order:  1 Unit  Indication: Hgb <7 gm/dL  Infuse Unit : 3 Hours (01-27-25 @ 23:42)      DIET:  Diet, NPO after Midnight:      NPO Start Date: 28-Jan-2025,   NPO Start Time: 23:59 (01-28-25 @ 09:52) [Active]  Diet, Clear Liquid (01-28-25 @ 09:52) [Active]          ALLERGIES:   Allergies    No Known Allergies    Intolerances        VITAL SIGNS:   Vital Signs Last 24 Hrs  T(C): 36.8 (28 Jan 2025 09:15), Max: 36.8 (28 Jan 2025 09:15)  T(F): 98.2 (28 Jan 2025 09:15), Max: 98.2 (28 Jan 2025 09:15)  HR: 63 (28 Jan 2025 09:15) (63 - 99)  BP: 101/60 (28 Jan 2025 09:15) (101/60 - 166/90)  BP(mean): --  RR: 16 (28 Jan 2025 09:15) (16 - 19)  SpO2: 100% (28 Jan 2025 09:15) (99% - 100%)    Parameters below as of 28 Jan 2025 09:15  Patient On (Oxygen Delivery Method): room air      I&O's Summary      PHYSICAL EXAM:   GENERAL:  No acute distress  HEENT:  NC/AT  CHEST:  No increased effort  HEART:  Regular rate  ABDOMEN:  Soft, mild TTP epigastric, non-distended  EXTREMITIES: No cyanosis  SKIN:  Warm, dry  NEURO:  Calm, cooperative    LABS:                        4.8    12.26 )-----------( 519      ( 27 Jan 2025 23:07 )             15.4     Hemoglobin: 4.8 g/dL (01-27-25 @ 23:07)    01-27    133[L]  |  104  |  13  ----------------------------<  131[H]  3.8   |  21[L]  |  0.91    Ca    9.1      27 Jan 2025 23:07    TPro  7.3  /  Alb  3.5  /  TBili  0.2  /  DBili  x   /  AST  20  /  ALT  13  /  AlkPhos  85  01-27    LIVER FUNCTIONS - ( 27 Jan 2025 23:07 )  Alb: 3.5 g/dL / Pro: 7.3 g/dL / ALK PHOS: 85 U/L / ALT: 13 U/L / AST: 20 U/L / GGT: x             PT/INR - ( 27 Jan 2025 23:07 )   PT: 11.8 sec;   INR: 1.01 ratio         PTT - ( 27 Jan 2025 23:07 )  PTT:23.2 sec    Lipase: 45 U/L (01-27-25 @ 23:07)                                    RADIOLOGY & ADDITIONAL STUDIES:      ACC: [id number] EXAM:  CT ANGIO CHEST AORTA WAWIC   ORDERED BY: CHRISTINE MALONE     ACC: [id number] EXAM:  CT ANGIO ABD PELV (W)AW IC   ORDERED BY: HENRY POWERS     PROCEDURE DATE:  01/28/2025          INTERPRETATION:  CLINICAL INFORMATION: Abdominal pain. Rule out   dissection.    COMPARISON: None.    CONTRAST/COMPLICATIONS:  IV Contrast: Omnipaque 350  90 cc administered   10 cc discarded  Oral Contrast: NONE  .    PROCEDURE:  CT Angiography of the Chest, Abdomen and Pelvis.  Precontrast and arterial phase imaging of the chest, abdomen and pelvis.  Sagittal and coronal reformats were performed as well as 3D (MIP)   reconstructions.    FINDINGS:  CHEST:  LUNGS AND LARGE AIRWAYS: Patent central airways. No pulmonary nodules.  PLEURA: No pleural effusion.  VESSELS: No aortic intramural hematoma, aneurysm, or evidence of acute   dissection. Aortic calcifications.  HEART: Heart size is normal. No pericardial effusion. Coronary artery   calcification and/or stents.  MEDIASTINUM ANDHILA: No lymphadenopathy.  CHEST WALL AND LOWER NECK: Within normal limits.    ABDOMEN AND PELVIS:  LIVER: Within normal limits.  BILE DUCTS: Normal caliber.  GALLBLADDER: Within normal limits.  SPLEEN: Within normal limits.  PANCREAS: Within normal limits.  ADRENALS: Within normal limits.  KIDNEYS/URETERS: No hydronephrosis. Left renal atrophy and cortical   scarring.    BLADDER: Within normal limits.  REPRODUCTIVE ORGANS: Prostate within normal limits.    BOWEL: No bowel obstruction. No evidence of acute gastrointestinal   hemorrhage. Proximal duodenal wall thickening/edema. Appendix is normal.  PERITONEUM/RETROPERITONEUM: Within normal limits.  VESSELS: Atherosclerotic changes. No aortic dissection. Replaced common   hepatic artery off of the superior mesenteric artery, anatomic variant.  LYMPH NODES: No lymphadenopathy.  ABDOMINAL WALL: Within normal limits.  BONES: Degenerative changes.    IMPRESSION:  No aortic dissection.    No evidence of acute gastrointestinal hemorrhage.    Proximal duodenal wall thickening/edema. Differential diagnostic   considerations include duodenitis and peptic ulcer disease. Follow-up   upper endoscopy is recommended for further evaluation.        --- End of Report ---            FARHANA SCALES MD; Attending Radiologist  This document has been electronically signed. Jan 28 2025  1:06AM  01-28-25 @ 00:16    -- -- --   ACC: [id number] EXAM:  US ABDOMEN RT UPR QUADRANT   ORDERED BY: HENRY POWERS     PROCEDURE DATE:  01/28/2025          INTERPRETATION:  CLINICAL INFORMATION: Right upper quadrant abdominal   pain.    COMPARISON: CT abdomen pelvis performed the night before 1/27/2025    TECHNIQUE: Sonography of the right upper quadrant.    FINDINGS:  Liver: Within normal limits.  Bile ducts: Normal caliber. Common bile duct measures 5 mm.  Gallbladder: Within normal limits. No shadowing gallstones, sludge or   wall thickening. Sonographic Elena's sign is reported as positive.  Pancreas: Visualized portions are within normal limits.  Right kidney: 10.1 cm. No hydronephrosis.  Ascites: None.  IVC: Visualized portions are within normal limits.    IMPRESSION:  No cholelithiasis or secondary signs of acute cholecystitis.        --- End of Report ---            ACACIA OCHOA MD; Attending Radiologist  This document has been electronically signed. Jan 28 2025  2:38AM

## 2025-01-28 NOTE — CARE COORDINATION ASSESSMENT. - OTHER PERTINENT DISCHARGE PLANNING INFORMATION:
Met patient at bedside.  Explained role of CM, verbalized understanding. Pt was made aware a CM will remain available through hospitalization.  Contact information given in discharge/ transitions resource folder. Patient lives in a private home with spouse and two adult children. There are zero KASEY and 4 steps inside. Patient is independent with ambulation and ADLs. Patient does not have DME or history of homecare/aide services. Patient endorses that he works part time. PCP is Helen Mays and cardiologist is Dr. Michelle Aguilera. Pharmacy is [facility].

## 2025-01-28 NOTE — H&P ADULT - PROBLEM SELECTOR PLAN 4
dvt ppx: Hold given acute anemia/possible GI bleed Pt with hx of HTN  -On home Losartan, metoprolol  -C/w  home meds with hold parameters

## 2025-01-28 NOTE — CONSULT NOTE ADULT - SUBJECTIVE AND OBJECTIVE BOX
Patient is a 64y old  Male who presents with a chief complaint of Peptic Ulcer Disease (28 Jan 2025 10:06)      HPI: Pt is a 63 y/o male with PMHX HTN, HLD, HFrEF, CAD s/p PCI with 2 stents (on ASA and Plavix), and DM presents today due to epigastric abdominal pain. Pt states he has had worsening abdominal pain for about 1 week and noticed his stool was harder than usual and he was having more gas. Pt started to take Metamucil with some relief and started eating prunes. Pt denies any blood in his stool. Pt states that pain was only getting worse and on Saturday he was diaphoretic. Pt also took some Gas-X with some relief of symptoms. Today pt said he noticed his stool was darker than usual and his pain got worse and he was dizzy and nauseous so he came to the ED. Pt currently has no nausea or dizziness just admits to mild epigastric pain 4/10 improved from 8/10. Pt denies HA, diarrhea, chest pain, palpitations.     Interval HPI: Pt seen and examined at the bedside, laying comfortably in bedside C/O some mild epigastric pain, endorses H/O melena. Denies chest pain, SOB, palpitations. Pt received 2U pRBC. GI planning and EGD.       ED Course:   Vitals: BP:166/90 , HR:99, Temp:98F , RR:19, SpO2: 99% on RA  Labs: Na 133, Co2 21, Glucose 131, aPTT 23.2, WBC 12.26, Hgb 4.8, Hct 15.4, FOBT+  CT Angio A/P: No aortic dissection. No evidence of acute gastrointestinal hemorrhage. Proximal duodenal wall thickening/edema. Differential diagnostic considerations include duodenitis and peptic ulcer disease. Follow-up   upper endoscopy is recommended for further evaluation  US Abdomen RUQ: No cholelithiasis or secondary signs of acute cholecystitis.    Received in the ED:  Ofirmev 1g IVPB x1, 1lNS bolus x1, Morphine 4mg IVP x1, 1 unit pRBCs, Protonix 40mg IVP x1    (28 Jan 2025 04:03)      PAST MEDICAL & SURGICAL HISTORY:  CAD (coronary artery disease)      Diabetes      HFrEF (heart failure with reduced ejection fraction)      STEMI (ST elevation myocardial infarction)      No significant past surgical history                ECHO  FINDINGS:      MEDICATIONS  (STANDING):  atorvastatin 80 milliGRAM(s) Oral at bedtime  dextrose 5%. 1000 milliLiter(s) (50 mL/Hr) IV Continuous <Continuous>  dextrose 50% Injectable 25 Gram(s) IV Push once  glucagon  Injectable 1 milliGRAM(s) IntraMuscular once  insulin lispro (ADMELOG) corrective regimen sliding scale   SubCutaneous every 6 hours  losartan 25 milliGRAM(s) Oral daily  metoprolol succinate ER 50 milliGRAM(s) Oral daily  pantoprazole Infusion 8 mG/Hr (10 mL/Hr) IV Continuous <Continuous>  sodium chloride 0.9%. 1000 milliLiter(s) (60 mL/Hr) IV Continuous <Continuous>  spironolactone 25 milliGRAM(s) Oral daily    MEDICATIONS  (PRN):  dextrose Oral Gel 15 Gram(s) Oral once PRN Blood Glucose LESS THAN 70 milliGRAM(s)/deciliter  simethicone 80 milliGRAM(s) Chew three times a day PRN Gas      FAMILY HISTORY:  FH: CAD (coronary artery disease) (Father)    FH: type 2 diabetes (Mother, Sibling)      Denies Family history of CAD or early MI    ROS:  Constitutional: denies fever, chills  HEENT: denies blurry vision, difficulty hearing  Respiratory: denies SOB, [place], cough  Cardiovascular: denies CP, palpitations, orthopnea, PND, LE edema  Gastrointestinal: +mild abdominal pain. denies nausea, vomiting, abdominal pain  Genitourinary: denies urinary changes  Skin: Denies rashes, itching  Neurologic: denies headache, weakness, dizziness  Hematology/Oncology: denies bleeding, easy bruising  ROS negative except as noted above      SOCIAL HISTORY:    No tobacco, Alcohol or Ddrug use    Vital Signs Last 24 Hrs  T(C): 36.7 (28 Jan 2025 11:50), Max: 36.8 (28 Jan 2025 09:15)  T(F): 98.1 (28 Jan 2025 11:50), Max: 98.2 (28 Jan 2025 09:15)  HR: 67 (28 Jan 2025 11:50) (63 - 99)  BP: 106/62 (28 Jan 2025 11:50) (101/60 - 166/90)  BP(mean): --  RR: 18 (28 Jan 2025 11:50) (16 - 19)  SpO2: 100% (28 Jan 2025 11:50) (99% - 100%)    Parameters below as of 28 Jan 2025 11:50  Patient On (Oxygen Delivery Method): room air        Physical Exam:  General: Well developed, well nourished, NAD  HEENT: NCAT, PERRLA, EOMI bl, moist mucous membranes   Neck: Supple, nontender, no mass  Neurology: A&Ox3, nonfocal, sensation intact   Respiratory: CTA B/L, No W/R/R  CV: RRR, +S1/S2, no murmurs, rubs or gallops  Abdominal: Soft, abdomen is soft, mild +TTP RUQ no guarding or rebound tenderness +BSx4, no palpable masses  Extremities: No C/C/E, + peripheral pulses  MSK: Normal ROM, no joint erythema or warmth, no joint swelling   Heme: No obvious ecchymosis or petechiae   Skin: warm, dry, normal color      ECG:    I&O's Detail      LABS:                        4.8    12.26 )-----------( 519      ( 27 Jan 2025 23:07 )             15.4     01-27    133[L]  |  104  |  13  ----------------------------<  131[H]  3.8   |  21[L]  |  0.91    Ca    9.1      27 Jan 2025 23:07    TPro  7.3  /  Alb  3.5  /  TBili  0.2  /  DBili  x   /  AST  20  /  ALT  13  /  AlkPhos  85  01-27        PT/INR - ( 27 Jan 2025 23:07 )   PT: 11.8 sec;   INR: 1.01 ratio         PTT - ( 27 Jan 2025 23:07 )  PTT:23.2 sec  Urinalysis Basic - ( 27 Jan 2025 23:07 )    Color: x / Appearance: x / SG: x / pH: x  Gluc: 131 mg/dL / Ketone: x  / Bili: x / Urobili: x   Blood: x / Protein: x / Nitrite: x   Leuk Esterase: x / RBC: x / WBC x   Sq Epi: x / Non Sq Epi: x / Bacteria: x      I&O's Summary    BNP  RADIOLOGY & ADDITIONAL STUDIES:

## 2025-01-28 NOTE — PROGRESS NOTE ADULT - PROBLEM SELECTOR PLAN 1
Pt with R upper abdominal pain and nausea x3 days  -Hgb 4.8 and Hct 15.4 on admission   -FOBT positive   -CT Angio A/P: No aortic dissection. No evidence of acute gastrointestinal hemorrhage. Proximal duodenal wall thickening/edema. Differential diagnostic considerations include duodenitis and peptic ulcer disease. Follow-up   upper endoscopy is recommended for further evaluation  -US Abdomen RUQ: No cholelithiasis or secondary signs of acute cholecystitis  -In ED given Ofirmev 1g IVPB x1, 1lNS bolus x1, Morphine 4mg IVP x1, 1 unit pRBCs, Protonix 40mg IVP x1  -GI consulted, Dr. Myers, f/u recs  -Monitor H&H  -Suspect peptic ulcer disease  -C/w protonix drip  -NPO for possible endoscopy  -Gentle IVF 60cc/hr for12 hr while NPO  -Cardio consult for clearance, [facility] f/u recs Pt with R upper abdominal pain and nausea x3 days  -Hgb 4.8 and Hct 15.4 on admission   -FOBT positive   -CT Angio A/P: No aortic dissection. No evidence of acute gastrointestinal hemorrhage. Proximal duodenal wall thickening/edema. Differential diagnostic considerations include duodenitis and peptic ulcer disease. Follow-up   upper endoscopy is recommended for further evaluation  -US Abdomen RUQ: No cholelithiasis or secondary signs of acute cholecystitis  -In ED given Ofirmev 1g IVPB x1, 1lNS bolus x1, Morphine 4mg IVP x1, 1 unit pRBCs, Protonix 40mg IVP x1  -GI consulted, Dr. Myers, f/u recs  -Monitor H&H  -Suspect peptic ulcer disease  -C/w protonix drip  -endoscopy planned for tomorrow  -keep on clear liquid diet   -recheck H&H at 1pm today, s/p 2 units RBCs  -Cardio consult for clearance, [facility] f/u recs

## 2025-01-28 NOTE — H&P ADULT - PROBLEM SELECTOR PLAN 2
Pt with hx of DM  -On home  -Hold  home meds  -Start  -F/u AM a1c  -Hypoglycemia protocol Pt with hx of DM  -On home Metformin  -Hold  home meds  -Start LDSS with FS QAC/QHS  -F/u AM a1c  -Hypoglycemia protocol Pt with hx of DM  -On home Metformin  -Hold  home meds  -Start LDSS Fs q6h  -F/u AM a1c  -Hypoglycemia protocol

## 2025-01-28 NOTE — H&P ADULT - PROBLEM SELECTOR PLAN 5
Pt denies hx of HFrEF but per chart review pt TTE from 08/2023 EF was 46% and from 04/2023 it was 33%  -Pt on home Farxiga and Spironolactone  -C/w home meds Pt denies hx of HFrEF but per chart review pt TTE from 08/2023 EF was 46% and from 04/2023 it was 33%  -Pt on home Farxiga and Spironolactone  -C/w home spironolactone and hold home Farxiga

## 2025-01-28 NOTE — PROGRESS NOTE ADULT - PROBLEM SELECTOR PLAN 5
Pt denies hx of HFrEF but per chart review pt TTE from 08/2023 EF was 46% and from 04/2023 it was 33%  -Pt on home Farxiga and Spironolactone  -C/w home spironolactone and hold home Farxiga

## 2025-01-28 NOTE — ED ADULT NURSE REASSESSMENT NOTE - NSFALLHARMRISKINTERV_ED_ALL_ED

## 2025-01-28 NOTE — CONSULT NOTE ADULT - ATTENDING COMMENTS
Pt is a 63 y/o male with PMHX HTN, HLD, HFrEF, CAD s/p PCI with 2 stents (on ASA and Plavix), and DM presents today due to epigastric abdominal pain and melena.     Patient presents with significant anemia with a hemoglobin of 4.8 on presentation likely secondary to a GI bleed given his melena.  His last PCI was in 2023.  Would stop his Plavix.  Would resume his aspirin as soon as possible.  Transfuse PRBCs to goal of hemoglobin greater than 8.  GI evaluation.  Most likely will need an emergent EGD.  Optimized from a cardiac standpoint for emergent low risk procedure.  Appears to be compensated from heart failure standpoint.  Echo from 8/2023 shows mild to moderate ischemic cardiomyopathy.  Would repeat an echo.  Monitor for signs of volume overload.  Most likely would give 20 of Lasix after his next PRBC infusion.  No signs of acute ischemia.  Troponins are negative.  Continue statin therapy.  Continue Toprol.  Continue his losartan.  Continue his Aldactone.  Would hold his Farxiga for now.  Monitor and trend his creatinine.    Monitor on remote telemetry.  Will follow-up with you

## 2025-01-28 NOTE — PHARMACOTHERAPY INTERVENTION NOTE - COMMENTS
65 yo ordered for protonix infusion for FOBT +. Per GI recommended PPI BID. Discussed with Dr Waller to enter pantoprazole 40 mg BID and d/c continuous infusion, orders updated per discussion.

## 2025-01-28 NOTE — H&P ADULT - PROBLEM SELECTOR PLAN 3
Pt with hx of CAD  -On home   -C/w home meds Pt with hx of CAD s/p 2 stents   -On home ASA/Plavix  -Hold home meds given possible bleed   -On home Atorvastatin, c/w home meds Pt with hx of CAD s/p 2 stents (2013 and 2023)  -On home ASA/Plavix  -Hold home meds given possible bleed restart when able   -On home Atorvastatin, c/w home meds Pt with hx of CAD s/p 2 stents (STEMI 4/2023 s/p YADIRA to pLAD 90% 4/23/23 requiring IABP (d/c'd 4/25/23 with residual LCx 70% and OM 80%)  -On home ASA/Plavix  -Hold home meds given possible bleed restart when able   -On home Atorvastatin, c/w home meds

## 2025-01-28 NOTE — PROGRESS NOTE ADULT - SUBJECTIVE AND OBJECTIVE BOX
Patient is a 64y old  Male who presents with a chief complaint of Peptic Ulcer Disease (28 Jan 2025 04:03)      INTERVAL HPI/OVERNIGHT EVENTS: No acute overnight events. Pt was seen and examined at bedside. Pt states that  Pt denies headache, dizziness, lightheadedness, fever, chills, body aches, CP, SOB, palpitations, abdominal pain, n/v, numbness/tingling.  No other complaints at this time.     MEDICATIONS  (STANDING):  atorvastatin 80 milliGRAM(s) Oral at bedtime  dextrose 5%. 1000 milliLiter(s) (50 mL/Hr) IV Continuous <Continuous>  dextrose 50% Injectable 25 Gram(s) IV Push once  glucagon  Injectable 1 milliGRAM(s) IntraMuscular once  insulin lispro (ADMELOG) corrective regimen sliding scale   SubCutaneous every 6 hours  losartan 25 milliGRAM(s) Oral daily  metoprolol succinate ER 50 milliGRAM(s) Oral daily  pantoprazole Infusion 8 mG/Hr (10 mL/Hr) IV Continuous <Continuous>  sodium chloride 0.9%. 1000 milliLiter(s) (60 mL/Hr) IV Continuous <Continuous>  spironolactone 25 milliGRAM(s) Oral daily    MEDICATIONS  (PRN):  dextrose Oral Gel 15 Gram(s) Oral once PRN Blood Glucose LESS THAN 70 milliGRAM(s)/deciliter      Allergies    No Known Allergies    Intolerances        REVIEW OF SYSTEMS:  CONSTITUTIONAL: No fever or chills  HEENT:  No headache, no sore throat  RESPIRATORY: No cough, wheezing, or shortness of breath  CARDIOVASCULAR: No chest pain, palpitations  GASTROINTESTINAL: No abd pain, nausea, vomiting, or diarrhea  GENITOURINARY: No dysuria, frequency, or hematuria  NEUROLOGICAL: no focal weakness or dizziness  MUSCULOSKELETAL: no myalgias     Vital Signs Last 24 Hrs  T(C): 36.7 (27 Jan 2025 21:38), Max: 36.7 (27 Jan 2025 21:38)  T(F): 98 (27 Jan 2025 21:38), Max: 98 (27 Jan 2025 21:38)  HR: 99 (27 Jan 2025 21:38) (99 - 99)  BP: 166/90 (27 Jan 2025 21:38) (166/90 - 166/90)  BP(mean): --  RR: 19 (27 Jan 2025 21:38) (19 - 19)  SpO2: 99% (27 Jan 2025 21:38) (99% - 99%)    Parameters below as of 27 Jan 2025 21:38  Patient On (Oxygen Delivery Method): room air        PHYSICAL EXAM:  GENERAL: NAD  HEENT:  anicteric, moist mucous membranes  CHEST/LUNG:  CTA b/l, no rales, wheezes, or rhonchi  HEART:  RRR, S1, S2  ABDOMEN:  BS+, soft, nontender, nondistended  EXTREMITIES: no edema, cyanosis, or calf tenderness  NERVOUS SYSTEM: answers questions and follows commands appropriately    LABS:                        4.8    12.26 )-----------( 519      ( 27 Jan 2025 23:07 )             15.4     CBC Full  -  ( 27 Jan 2025 23:07 )  WBC Count : 12.26 K/uL  Hemoglobin : 4.8 g/dL  Hematocrit : 15.4 %  Platelet Count - Automated : 519 K/uL  Mean Cell Volume : 68.1 fl  Mean Cell Hemoglobin : 21.2 pg  Mean Cell Hemoglobin Concentration : 31.2 g/dL  Auto Neutrophil # : 9.43 K/uL  Auto Lymphocyte # : 1.68 K/uL  Auto Monocyte # : 0.91 K/uL  Auto Eosinophil # : 0.04 K/uL  Auto Basophil # : 0.03 K/uL  Auto Neutrophil % : 77.0 %  Auto Lymphocyte % : 13.7 %  Auto Monocyte % : 7.4 %  Auto Eosinophil % : 0.3 %  Auto Basophil % : 0.2 %    27 Jan 2025 23:07    133    |  104    |  13     ----------------------------<  131    3.8     |  21     |  0.91     Ca    9.1        27 Jan 2025 23:07    TPro  7.3    /  Alb  3.5    /  TBili  0.2    /  DBili  x      /  AST  20     /  ALT  13     /  AlkPhos  85     27 Jan 2025 23:07    PT/INR - ( 27 Jan 2025 23:07 )   PT: 11.8 sec;   INR: 1.01 ratio         PTT - ( 27 Jan 2025 23:07 )  PTT:23.2 sec  Urinalysis Basic - ( 27 Jan 2025 23:07 )    Color: x / Appearance: x / SG: x / pH: x  Gluc: 131 mg/dL / Ketone: x  / Bili: x / Urobili: x   Blood: x / Protein: x / Nitrite: x   Leuk Esterase: x / RBC: x / WBC x   Sq Epi: x / Non Sq Epi: x / Bacteria: x      CAPILLARY BLOOD GLUCOSE      POCT Blood Glucose.: 120 mg/dL (28 Jan 2025 07:36)          RADIOLOGY & ADDITIONAL TESTS: ____    Personally reviewed.     Consultant(s) Notes Reviewed:  [x] YES  [ ] NO     Patient is a 64y old  Male who presents with a chief complaint of Peptic Ulcer Disease (28 Jan 2025 04:03)      INTERVAL HPI/OVERNIGHT EVENTS: No acute overnight events. Pt was seen and examined at bedside. Pt states that he is still having right upper/epigastric abdominal pain but it has greatly improved since arriving to ED. He was having this abdominal pain for about 2 weeks and which was getting progressively worse. He started taking ibuprofen and naproxen for the pain about 3 days ago and noticed the tarry stools yesterday morning. He takes aspirin and plavix for hx of stents but was told to stop aspirin last week. Pt has not had a BM since arriving to ED. Denies HA, chest pain, SOB, n/v.   No other complaints at this time.     MEDICATIONS  (STANDING):  atorvastatin 80 milliGRAM(s) Oral at bedtime  dextrose 5%. 1000 milliLiter(s) (50 mL/Hr) IV Continuous <Continuous>  dextrose 50% Injectable 25 Gram(s) IV Push once  glucagon  Injectable 1 milliGRAM(s) IntraMuscular once  insulin lispro (ADMELOG) corrective regimen sliding scale   SubCutaneous every 6 hours  losartan 25 milliGRAM(s) Oral daily  metoprolol succinate ER 50 milliGRAM(s) Oral daily  pantoprazole Infusion 8 mG/Hr (10 mL/Hr) IV Continuous <Continuous>  sodium chloride 0.9%. 1000 milliLiter(s) (60 mL/Hr) IV Continuous <Continuous>  spironolactone 25 milliGRAM(s) Oral daily    MEDICATIONS  (PRN):  dextrose Oral Gel 15 Gram(s) Oral once PRN Blood Glucose LESS THAN 70 milliGRAM(s)/deciliter      Allergies    No Known Allergies    Intolerances        REVIEW OF SYSTEMS:  CONSTITUTIONAL: No fever or chills  HEENT:  No headache, no sore throat  RESPIRATORY: No cough, wheezing, or shortness of breath  CARDIOVASCULAR: No chest pain, palpitations  GASTROINTESTINAL: +abdominal pain, no nausea, vomiting, or diarrhea  GENITOURINARY: No dysuria, frequency, or hematuria  NEUROLOGICAL: no focal weakness or dizziness  MUSCULOSKELETAL: no myalgias     Vital Signs Last 24 Hrs  T(C): 36.7 (27 Jan 2025 21:38), Max: 36.7 (27 Jan 2025 21:38)  T(F): 98 (27 Jan 2025 21:38), Max: 98 (27 Jan 2025 21:38)  HR: 99 (27 Jan 2025 21:38) (99 - 99)  BP: 166/90 (27 Jan 2025 21:38) (166/90 - 166/90)  BP(mean): --  RR: 19 (27 Jan 2025 21:38) (19 - 19)  SpO2: 99% (27 Jan 2025 21:38) (99% - 99%)    Parameters below as of 27 Jan 2025 21:38  Patient On (Oxygen Delivery Method): room air        PHYSICAL EXAM:  GENERAL: NAD  HEENT:  anicteric, moist mucous membranes  CHEST/LUNG:  CTA b/l, no rales, wheezes, or rhonchi  HEART:  RRR, S1, S2  ABDOMEN:  nontender to palpation, BS+, soft, nondistended  EXTREMITIES: no edema, cyanosis, or calf tenderness  NERVOUS SYSTEM: answers questions and follows commands appropriately    LABS:                        4.8    12.26 )-----------( 519      ( 27 Jan 2025 23:07 )             15.4     CBC Full  -  ( 27 Jan 2025 23:07 )  WBC Count : 12.26 K/uL  Hemoglobin : 4.8 g/dL  Hematocrit : 15.4 %  Platelet Count - Automated : 519 K/uL  Mean Cell Volume : 68.1 fl  Mean Cell Hemoglobin : 21.2 pg  Mean Cell Hemoglobin Concentration : 31.2 g/dL  Auto Neutrophil # : 9.43 K/uL  Auto Lymphocyte # : 1.68 K/uL  Auto Monocyte # : 0.91 K/uL  Auto Eosinophil # : 0.04 K/uL  Auto Basophil # : 0.03 K/uL  Auto Neutrophil % : 77.0 %  Auto Lymphocyte % : 13.7 %  Auto Monocyte % : 7.4 %  Auto Eosinophil % : 0.3 %  Auto Basophil % : 0.2 %    27 Jan 2025 23:07    133    |  104    |  13     ----------------------------<  131    3.8     |  21     |  0.91     Ca    9.1        27 Jan 2025 23:07    TPro  7.3    /  Alb  3.5    /  TBili  0.2    /  DBili  x      /  AST  20     /  ALT  13     /  AlkPhos  85     27 Jan 2025 23:07    PT/INR - ( 27 Jan 2025 23:07 )   PT: 11.8 sec;   INR: 1.01 ratio         PTT - ( 27 Jan 2025 23:07 )  PTT:23.2 sec  Urinalysis Basic - ( 27 Jan 2025 23:07 )    Color: x / Appearance: x / SG: x / pH: x  Gluc: 131 mg/dL / Ketone: x  / Bili: x / Urobili: x   Blood: x / Protein: x / Nitrite: x   Leuk Esterase: x / RBC: x / WBC x   Sq Epi: x / Non Sq Epi: x / Bacteria: x      CAPILLARY BLOOD GLUCOSE      POCT Blood Glucose.: 120 mg/dL (28 Jan 2025 07:36)          RADIOLOGY & ADDITIONAL TESTS: ____    Personally reviewed.     Consultant(s) Notes Reviewed:  [x] YES  [ ] NO

## 2025-01-28 NOTE — PATIENT CHOICE NOTE. - NSPTCHOICENOTES_GEN_ALL_CORE
Transition folder and patient choice lists provided. Referral initiated for [facility] and will be sent prior to discharge if appropriate.

## 2025-01-28 NOTE — PROGRESS NOTE ADULT - PROBLEM SELECTOR PLAN 3
Pt with hx of CAD s/p 2 stents (STEMI 4/2023 s/p YADIRA to pLAD 90% 4/23/23 requiring IABP (d/c'd 4/25/23 with residual LCx 70% and OM 80%)  -On home ASA/Plavix  -Hold home meds given possible bleed restart when able   -On home Atorvastatin, c/w home meds

## 2025-01-28 NOTE — H&P ADULT - NSHPLABSRESULTS_GEN_ALL_CORE
LABS:  cret                        4.8    12.26 )-----------( 519      ( 27 Jan 2025 23:07 )             15.4     01-27    133[L]  |  104  |  13  ----------------------------<  131[H]  3.8   |  21[L]  |  0.91    Ca    9.1      27 Jan 2025 23:07    TPro  7.3  /  Alb  3.5  /  TBili  0.2  /  DBili  x   /  AST  20  /  ALT  13  /  AlkPhos  85  01-27    PT/INR - ( 27 Jan 2025 23:07 )   PT: 11.8 sec;   INR: 1.01 ratio         PTT - ( 27 Jan 2025 23:07 )  PTT:23.2 sec        < from: CT Angio Chest Aorta w/wo IV Cont (01.28.25 @ 00:16) >      FINDINGS:  CHEST:  LUNGS AND LARGE AIRWAYS: Patent central airways. No pulmonary nodules.  PLEURA: No pleural effusion.  VESSELS: No aortic intramural hematoma, aneurysm, or evidence of acute   dissection. Aortic calcifications.  HEART: Heart size is normal. No pericardial effusion. Coronary artery   calcification and/or stents.  MEDIASTINUM ANDHILA: No lymphadenopathy.  CHEST WALL AND LOWER NECK: Within normal limits.    ABDOMEN AND PELVIS:  LIVER: Within normal limits.  BILE DUCTS: Normal caliber.  GALLBLADDER: Within normal limits.  SPLEEN: Within normal limits.  PANCREAS: Within normal limits.  ADRENALS: Within normal limits.  KIDNEYS/URETERS: No hydronephrosis. Left renal atrophy and cortical   scarring.    BLADDER: Within normal limits.  REPRODUCTIVE ORGANS: Prostate within normal limits.    BOWEL: No bowel obstruction. No evidence of acute gastrointestinal   hemorrhage. Proximal duodenal wall thickening/edema. Appendix is normal.  PERITONEUM/RETROPERITONEUM: Within normal limits.  VESSELS: Atherosclerotic changes. No aortic dissection. Replaced common   hepatic artery off of the superior mesenteric artery, anatomic variant.  LYMPH NODES: No lymphadenopathy.  ABDOMINAL WALL: Within normal limits.  BONES: Degenerative changes.    IMPRESSION:  No aortic dissection.    No evidence of acute gastrointestinal hemorrhage.    Proximal duodenal wall thickening/edema. Differential diagnostic   considerations include duodenitis and peptic ulcer disease. Follow-up   upper endoscopy is recommended for further evaluation.    < end of copied text >    < from: US Abdomen Upper Quadrant Right (01.28.25 @ 02:32) >    IMPRESSION:  No cholelithiasis or secondary signs of acute cholecystitis.      < end of copied text >

## 2025-01-28 NOTE — H&P ADULT - ATTENDING COMMENTS
63 y/o male with PMHX HTN, HLD, HFrEF, CAD s/p PCI with 2 stents (on ASA and Plavix), and DM presents today due to epigastric abdominal pain admitted for anemia 2/2 peptic ulcer disease.    Agree with above. Edited where appropriate

## 2025-01-28 NOTE — H&P ADULT - HISTORY OF PRESENT ILLNESS
Pt is a 65 y/o male with PMHX HTN, HLD, CAD (on ASA and Plavix), and DM presents today due to epigastric abdominal pain. pt admits to mild nausea with the pain. pt describes the pain to epigastric region as aching, non-radiating, and currently 8/10. pt denies vomiting, chest pain, fever, shortness of breath, hematochezia , melena, or any other complaints.      ED Course:     Vitals: BP:166/90 , HR:99, Temp:98F , RR:19, SpO2: 99% on RA  Labs: Na 133, Co2 21, Glucose 131, aPTT 23.2, WBC 12.26, Hgb 4.8, Hct 15.4, FOBT+,   CT Angio A/P: No aortic dissection. No evidence of acute gastrointestinal hemorrhage. Proximal duodenal wall thickening/edema. Differential diagnostic considerations include duodenitis and peptic ulcer disease. Follow-up   upper endoscopy is recommended for further evaluation  US Abdomen RUQ: No cholelithiasis or secondary signs of acute cholecystitis.    Received in the ED:  Ofirmev 1g IVPB x1, 1lNS bolus x1, Morphine 4mg IVP x1, 1 unit pRBCs, Protonix 40mg IVP x1    Pt is a 63 y/o male with PMHX HTN, HLD, HFrEF(?), CAD s/p PCI with 2 stents (on ASA and Plavix), and DM presents today due to epigastric abdominal pain. Pt states he has had worsening abdominal pain for about 1 week and noticed his stool was harder than usual and he was having more gas. Pt started to take Metamucil with some relief and started eating prunes. Pt denies any blood in his stool. Pt states that pain was only getting worse and on Saturday he was diaphoretic. Pt also took some Gas-X with some relief of symptoms. Today pt said he noticed his stool was darker than usual and his pain got worse and he was dizzy and nauseous so he came to the ED. Pt currently has no nausea or dizziness just admits to mild epigastric pain 4/10 improved from 8/10. Pt denies HA, diarrhea, chest pain, palpitations.       ED Course:     Vitals: BP:166/90 , HR:99, Temp:98F , RR:19, SpO2: 99% on RA  Labs: Na 133, Co2 21, Glucose 131, aPTT 23.2, WBC 12.26, Hgb 4.8, Hct 15.4, FOBT+  CT Angio A/P: No aortic dissection. No evidence of acute gastrointestinal hemorrhage. Proximal duodenal wall thickening/edema. Differential diagnostic considerations include duodenitis and peptic ulcer disease. Follow-up   upper endoscopy is recommended for further evaluation  US Abdomen RUQ: No cholelithiasis or secondary signs of acute cholecystitis.    Received in the ED:  Ofirmev 1g IVPB x1, 1lNS bolus x1, Morphine 4mg IVP x1, 1 unit pRBCs, Protonix 40mg IVP x1    Pt is a 63 y/o male with PMHX HTN, HLD, HFrEF, CAD s/p PCI with 2 stents (on ASA and Plavix), and DM presents today due to epigastric abdominal pain. Pt states he has had worsening abdominal pain for about 1 week and noticed his stool was harder than usual and he was having more gas. Pt started to take Metamucil with some relief and started eating prunes. Pt denies any blood in his stool. Pt states that pain was only getting worse and on Saturday he was diaphoretic. Pt also took some Gas-X with some relief of symptoms. Today pt said he noticed his stool was darker than usual and his pain got worse and he was dizzy and nauseous so he came to the ED. Pt currently has no nausea or dizziness just admits to mild epigastric pain 4/10 improved from 8/10. Pt denies HA, diarrhea, chest pain, palpitations.       ED Course:     Vitals: BP:166/90 , HR:99, Temp:98F , RR:19, SpO2: 99% on RA  Labs: Na 133, Co2 21, Glucose 131, aPTT 23.2, WBC 12.26, Hgb 4.8, Hct 15.4, FOBT+  CT Angio A/P: No aortic dissection. No evidence of acute gastrointestinal hemorrhage. Proximal duodenal wall thickening/edema. Differential diagnostic considerations include duodenitis and peptic ulcer disease. Follow-up   upper endoscopy is recommended for further evaluation  US Abdomen RUQ: No cholelithiasis or secondary signs of acute cholecystitis.    Received in the ED:  Ofirmev 1g IVPB x1, 1lNS bolus x1, Morphine 4mg IVP x1, 1 unit pRBCs, Protonix 40mg IVP x1    Pt is a 65 y/o male with PMHX HTN, HLD, HFrEF, CAD s/p PCI with 2 stents (on ASA and Plavix), and DM presents today due to epigastric abdominal pain. Pt states he has had worsening abdominal pain for about 1 week and noticed his stool was harder than usual and he was having more gas. Pt started to take Metamucil with some relief and started eating prunes. Pt denies any blood in his stool. Pt states that pain was only getting worse and on Saturday he was diaphoretic. Pt also took some Gas-X with some relief of symptoms. Today pt said he noticed his stool was darker than usual and his pain got worse and he was dizzy and nauseous so he came to the ED. Pt currently has no nausea or dizziness just admits to mild epigastric pain 4/10 improved from 8/10. Pt denies HA, diarrhea, chest pain, palpitations.       ED Course:   Vitals: BP:166/90 , HR:99, Temp:98F , RR:19, SpO2: 99% on RA  Labs: Na 133, Co2 21, Glucose 131, aPTT 23.2, WBC 12.26, Hgb 4.8, Hct 15.4, FOBT+  CT Angio A/P: No aortic dissection. No evidence of acute gastrointestinal hemorrhage. Proximal duodenal wall thickening/edema. Differential diagnostic considerations include duodenitis and peptic ulcer disease. Follow-up   upper endoscopy is recommended for further evaluation  US Abdomen RUQ: No cholelithiasis or secondary signs of acute cholecystitis.    Received in the ED:  Ofirmev 1g IVPB x1, 1lNS bolus x1, Morphine 4mg IVP x1, 1 unit pRBCs, Protonix 40mg IVP x1

## 2025-01-28 NOTE — H&P ADULT - NSHPREVIEWOFSYSTEMS_GEN_ALL_CORE
REVIEW OF SYSTEMS:  CONSTITUTIONAL: No fever, chills or fatigue.  HENMT: No HA, dizziness, rhinorrhea  RESPIRATORY: No cough or shortness of breath.  CARDIOVASCULAR: No chest pain, palpitations.  GASTROINTESTINAL: No abdominal pain. No nausea or vomiting; No diarrhea or constipation. No blood per rectum.  GENITOURINARY: No dysuria, changes in frequency, hematuria, or incontinence  NEUROLOGICAL: Baseline strength. Sensation intact bilaterally.  SKIN: No itching, rashes  MUSCULOSKELETAL: No joint pain or swelling; No muscle, back, or extremity pain REVIEW OF SYSTEMS:  CONSTITUTIONAL: No fever, chills or fatigue.  HENMT: No HA, dizziness, rhinorrhea  RESPIRATORY: No cough or shortness of breath.  CARDIOVASCULAR: No chest pain, palpitations.  GASTROINTESTINAL: +mild abdominal pain. No nausea or vomiting; No diarrhea or constipation. No blood per rectum.  GENITOURINARY: No dysuria, changes in frequency, hematuria, or incontinence  NEUROLOGICAL: Baseline strength. Sensation intact bilaterally.  SKIN: No itching, rashes  MUSCULOSKELETAL: No joint pain or swelling; No muscle, back, or extremity pain

## 2025-01-29 ENCOUNTER — TRANSCRIPTION ENCOUNTER (OUTPATIENT)
Age: 65
End: 2025-01-29

## 2025-01-29 VITALS
HEART RATE: 51 BPM | OXYGEN SATURATION: 98 % | RESPIRATION RATE: 18 BRPM | DIASTOLIC BLOOD PRESSURE: 62 MMHG | SYSTOLIC BLOOD PRESSURE: 109 MMHG | TEMPERATURE: 97 F

## 2025-01-29 LAB
A1C WITH ESTIMATED AVERAGE GLUCOSE RESULT: 5.7 % — HIGH (ref 4–5.6)
ALBUMIN SERPL ELPH-MCNC: 3 G/DL — LOW (ref 3.3–5)
ALP SERPL-CCNC: 78 U/L — SIGNIFICANT CHANGE UP (ref 40–120)
ALT FLD-CCNC: 14 U/L — SIGNIFICANT CHANGE UP (ref 12–78)
ANION GAP SERPL CALC-SCNC: 8 MMOL/L — SIGNIFICANT CHANGE UP (ref 5–17)
AST SERPL-CCNC: 21 U/L — SIGNIFICANT CHANGE UP (ref 15–37)
BASOPHILS # BLD AUTO: 0.04 K/UL — SIGNIFICANT CHANGE UP (ref 0–0.2)
BASOPHILS NFR BLD AUTO: 0.4 % — SIGNIFICANT CHANGE UP (ref 0–2)
BILIRUB SERPL-MCNC: 0.4 MG/DL — SIGNIFICANT CHANGE UP (ref 0.2–1.2)
BUN SERPL-MCNC: 8 MG/DL — SIGNIFICANT CHANGE UP (ref 7–23)
CALCIUM SERPL-MCNC: 8.9 MG/DL — SIGNIFICANT CHANGE UP (ref 8.5–10.1)
CHLORIDE SERPL-SCNC: 110 MMOL/L — HIGH (ref 96–108)
CO2 SERPL-SCNC: 23 MMOL/L — SIGNIFICANT CHANGE UP (ref 22–31)
CREAT SERPL-MCNC: 0.87 MG/DL — SIGNIFICANT CHANGE UP (ref 0.5–1.3)
EGFR: 98 ML/MIN/1.73M2 — SIGNIFICANT CHANGE UP
EOSINOPHIL # BLD AUTO: 0.23 K/UL — SIGNIFICANT CHANGE UP (ref 0–0.5)
EOSINOPHIL NFR BLD AUTO: 2.5 % — SIGNIFICANT CHANGE UP (ref 0–6)
ESTIMATED AVERAGE GLUCOSE: 117 MG/DL — HIGH (ref 68–114)
GLUCOSE SERPL-MCNC: 93 MG/DL — SIGNIFICANT CHANGE UP (ref 70–99)
HCT VFR BLD CALC: 28.9 % — LOW (ref 39–50)
HGB BLD-MCNC: 9.6 G/DL — LOW (ref 13–17)
IMM GRANULOCYTES NFR BLD AUTO: 0.8 % — SIGNIFICANT CHANGE UP (ref 0–0.9)
LYMPHOCYTES # BLD AUTO: 1.99 K/UL — SIGNIFICANT CHANGE UP (ref 1–3.3)
LYMPHOCYTES # BLD AUTO: 21.6 % — SIGNIFICANT CHANGE UP (ref 13–44)
MAGNESIUM SERPL-MCNC: 2.1 MG/DL — SIGNIFICANT CHANGE UP (ref 1.6–2.6)
MCHC RBC-ENTMCNC: 26.1 PG — LOW (ref 27–34)
MCHC RBC-ENTMCNC: 33.2 G/DL — SIGNIFICANT CHANGE UP (ref 32–36)
MCV RBC AUTO: 78.5 FL — LOW (ref 80–100)
MONOCYTES # BLD AUTO: 0.94 K/UL — HIGH (ref 0–0.9)
MONOCYTES NFR BLD AUTO: 10.2 % — SIGNIFICANT CHANGE UP (ref 2–14)
NEUTROPHILS # BLD AUTO: 5.96 K/UL — SIGNIFICANT CHANGE UP (ref 1.8–7.4)
NEUTROPHILS NFR BLD AUTO: 64.5 % — SIGNIFICANT CHANGE UP (ref 43–77)
NRBC # BLD: 0 /100 WBCS — SIGNIFICANT CHANGE UP (ref 0–0)
NRBC BLD-RTO: 0 /100 WBCS — SIGNIFICANT CHANGE UP (ref 0–0)
PHOSPHATE SERPL-MCNC: 2.5 MG/DL — SIGNIFICANT CHANGE UP (ref 2.5–4.5)
PLATELET # BLD AUTO: 374 K/UL — SIGNIFICANT CHANGE UP (ref 150–400)
POTASSIUM SERPL-MCNC: 3.9 MMOL/L — SIGNIFICANT CHANGE UP (ref 3.5–5.3)
POTASSIUM SERPL-SCNC: 3.9 MMOL/L — SIGNIFICANT CHANGE UP (ref 3.5–5.3)
PROT SERPL-MCNC: 6.6 G/DL — SIGNIFICANT CHANGE UP (ref 6–8.3)
RBC # BLD: 3.68 M/UL — LOW (ref 4.2–5.8)
RBC # FLD: 21.4 % — HIGH (ref 10.3–14.5)
SODIUM SERPL-SCNC: 141 MMOL/L — SIGNIFICANT CHANGE UP (ref 135–145)
WBC # BLD: 9.23 K/UL — SIGNIFICANT CHANGE UP (ref 3.8–10.5)
WBC # FLD AUTO: 9.23 K/UL — SIGNIFICANT CHANGE UP (ref 3.8–10.5)

## 2025-01-29 PROCEDURE — 99232 SBSQ HOSP IP/OBS MODERATE 35: CPT

## 2025-01-29 PROCEDURE — 88305 TISSUE EXAM BY PATHOLOGIST: CPT | Mod: 26

## 2025-01-29 PROCEDURE — 88312 SPECIAL STAINS GROUP 1: CPT | Mod: 26

## 2025-01-29 PROCEDURE — 99239 HOSP IP/OBS DSCHRG MGMT >30: CPT

## 2025-01-29 DEVICE — ESOPHAGEAL BALLOON CATH CRE FIXED WIRE 6-7-8MM: Type: IMPLANTABLE DEVICE | Status: FUNCTIONAL

## 2025-01-29 DEVICE — ESOPHAGEAL BALLOON CATH CRE FIXED WIRE 15-16.5-18MM: Type: IMPLANTABLE DEVICE | Status: FUNCTIONAL

## 2025-01-29 DEVICE — ESOPHAGEAL BALLOON CATH CRE FIXED WIRE 12-13.5-15MM: Type: IMPLANTABLE DEVICE | Status: FUNCTIONAL

## 2025-01-29 DEVICE — ESOPHAGEAL BALLOON CATH CRE FIXED WIRE 10-11-12MM: Type: IMPLANTABLE DEVICE | Status: FUNCTIONAL

## 2025-01-29 DEVICE — ESOPHAGEAL BALLOON CATH CRE FIXED WIRE 8-9-10MM: Type: IMPLANTABLE DEVICE | Status: FUNCTIONAL

## 2025-01-29 DEVICE — HEMOSPRAY HEMOSTAT ENDO 7F: Type: IMPLANTABLE DEVICE | Status: FUNCTIONAL

## 2025-01-29 RX ORDER — PANTOPRAZOLE 20 MG/1
1 TABLET, DELAYED RELEASE ORAL
Qty: 60 | Refills: 0
Start: 2025-01-29 | End: 2025-02-27

## 2025-01-29 RX ORDER — INSULIN LISPRO 100/ML
VIAL (ML) SUBCUTANEOUS
Refills: 0 | Status: DISCONTINUED | OUTPATIENT
Start: 2025-01-29 | End: 2025-01-29

## 2025-01-29 RX ORDER — NICOTINE POLACRILEX 4 MG/1
1 LOZENGE ORAL
Qty: 1 | Refills: 0
Start: 2025-01-29 | End: 2025-02-11

## 2025-01-29 RX ORDER — ASPIRIN 81 MG/1
1 TABLET, COATED ORAL
Refills: 0 | DISCHARGE

## 2025-01-29 RX ADMIN — PANTOPRAZOLE 40 MILLIGRAM(S): 20 TABLET, DELAYED RELEASE ORAL at 06:39

## 2025-01-29 RX ADMIN — Medication 20 MILLIGRAM(S): at 01:59

## 2025-01-29 RX ADMIN — PANTOPRAZOLE 40 MILLIGRAM(S): 20 TABLET, DELAYED RELEASE ORAL at 18:07

## 2025-01-29 NOTE — DISCHARGE NOTE NURSING/CASE MANAGEMENT/SOCIAL WORK - FINANCIAL ASSISTANCE
[facility] provides services at a reduced cost to those who are determined to be eligible through [facility]’s financial assistance program. Information regarding [facility]’s financial assistance program can be found by going to [email or web address] or by calling [phone].

## 2025-01-29 NOTE — PROGRESS NOTE ADULT - SUBJECTIVE AND OBJECTIVE BOX
[facility] -- Luba Walker, Ishmael Pat Savella, Goodger, Cohen: Office # [phone]    Follow Up:  PUD, Cardiac clearance     Subjective/Observations: Patient awake, alert, resting in bed. Denies chest pain, palpitations and dizziness. Denies any difficulty breathing. No orthopnea and PND. Tolerating room air.     REVIEW OF SYSTEMS: All other review of systems are negative unless indicated above    PAST MEDICAL & SURGICAL HISTORY:  CAD (coronary artery disease)      Diabetes      HFrEF (heart failure with reduced ejection fraction)      STEMI (ST elevation myocardial infarction)      No significant past surgical history      MEDICATIONS  (STANDING):  atorvastatin 80 milliGRAM(s) Oral at bedtime  dextrose 5%. 1000 milliLiter(s) (50 mL/Hr) IV Continuous <Continuous>  dextrose 50% Injectable 25 Gram(s) IV Push once  glucagon  Injectable 1 milliGRAM(s) IntraMuscular once  influenza   Vaccine 0.5 milliLiter(s) IntraMuscular once  insulin lispro (ADMELOG) corrective regimen sliding scale   SubCutaneous every 6 hours  losartan 25 milliGRAM(s) Oral daily  metoprolol succinate ER 50 milliGRAM(s) Oral daily  pantoprazole  Injectable 40 milliGRAM(s) IV Push every 12 hours  sodium chloride 0.9%. 1000 milliLiter(s) (60 mL/Hr) IV Continuous <Continuous>  spironolactone 25 milliGRAM(s) Oral daily    MEDICATIONS  (PRN):  dextrose Oral Gel 15 Gram(s) Oral once PRN Blood Glucose LESS THAN 70 milliGRAM(s)/deciliter  simethicone 80 milliGRAM(s) Chew three times a day PRN Gas    Allergies    No Known Allergies    Intolerances      Vital Signs Last 24 Hrs  T(C): 36.9 (29 Jan 2025 11:25), Max: 37.2 (29 Jan 2025 04:39)  T(F): 98.5 (29 Jan 2025 11:25), Max: 98.9 (29 Jan 2025 04:39)  HR: 51 (29 Jan 2025 11:25) (51 - 88)  BP: 100/58 (29 Jan 2025 11:25) (99/55 - 103/62)  BP(mean): --  RR: 18 (29 Jan 2025 11:25) (15 - 18)  SpO2: 94% (29 Jan 2025 11:25) (94% - 98%)    Parameters below as of 29 Jan 2025 11:25  Patient On (Oxygen Delivery Method): room air      I&O's Summary    28 Jan 2025 07:01  -  29 Jan 2025 07:00  --------------------------------------------------------  IN: 277 mL / OUT: 1550 mL / NET: -1273 mL          TELE: SR 40-80s mostly on low side 40-50s   PHYSICAL EXAM:  Constitutional: NAD, awake and alert  HEENT: Moist Mucous Membranes, Anicteric  Pulmonary: Non-labored, breath sounds are clear bilaterally, No wheezing, rales or rhonchi  Cardiovascular: Regular, S1 and S2, No murmurs, No rubs, gallops or clicks  Gastrointestinal:  soft, nontender, nondistended   Lymph: No peripheral edema. No lymphadenopathy.   Skin: No visible rashes or ulcers.  Psych:  Mood & affect appropriate      LABS: All Labs Reviewed:                        9.6    9.23  )-----------( 374      ( 29 Jan 2025 07:45 )             28.9                         7.9    x     )-----------( x        ( 28 Jan 2025 21:21 )             24.6                         6.5    x     )-----------( x        ( 28 Jan 2025 15:09 )             19.8     29 Jan 2025 07:45    141    |  110    |  8      ----------------------------<  93     3.9     |  23     |  0.87   28 Jan 2025 15:09    140    |  113    |  9      ----------------------------<  118    3.3     |  23     |  0.87   27 Jan 2025 23:07    133    |  104    |  13     ----------------------------<  131    3.8     |  21     |  0.91     Ca    8.9        29 Jan 2025 07:45  Ca    8.3        28 Jan 2025 15:09  Ca    9.1        27 Jan 2025 23:07  Phos  2.5       29 Jan 2025 07:45  Mg     2.1       29 Jan 2025 07:45    TPro  6.6    /  Alb  3.0    /  TBili  0.4    /  DBili  x      /  AST  21     /  ALT  14     /  AlkPhos  78     29 Jan 2025 07:45  TPro  7.3    /  Alb  3.5    /  TBili  0.2    /  DBili  x      /  AST  20     /  ALT  13     /  AlkPhos  85     27 Jan 2025 23:07   LIVER FUNCTIONS - ( 29 Jan 2025 07:45 )  Alb: 3.0 g/dL / Pro: 6.6 g/dL / ALK PHOS: 78 U/L / ALT: 14 U/L / AST: 21 U/L / GGT: x           PT/INR - ( 27 Jan 2025 23:07 )   PT: 11.8 sec;   INR: 1.01 ratio         PTT - ( 27 Jan 2025 23:07 )  PTT:23.2 secTroponin I, High Sensitivity Result: 6.6 ng/L (01-27-25 @ 23:07)    12 Lead ECG:   Ventricular Rate 85 BPM    Atrial Rate 85 BPM    P-R Interval 140 ms    QRS Duration 94 ms    Q-T Interval 380 ms    QTC Calculation(Bazett) 452 ms    P Axis 45 degrees    R Axis 59 degrees    T Axis 74 degrees    Diagnosis Line *** Poor data quality, interpretation may be adversely affected  Normal sinus rhythm  Normal ECG  When compared with ECG of 23-APR-2023 04:03,  Minimal criteria for Anterior infarct are no longer present  ST now depressed in Lateral leads  Confirmed by SHELDON BECKHAM ([id number]) on 1/29/2025 12:20:50 AM (01-27-25 @ 21:42)      TRANSTHORACIC ECHOCARDIOGRAM REPORT  ________________________________________________________________________________                                      _______       Pt. Name:       RENAY FLORIAN Study Date:    8/1/2023  MRN:            [id number]     YOB: 1960  Accession #:    [id number]      Age:           62 years  Account#:       [id number]   Gender:        M  Heart Rate:     60 bpm         Height:        66.00 in (167.64 cm)  Rhythm:                        Weight:  133.00 lb (60.33 kg)  Blood Pressure: 107/66 mmHg    BSA/BMI:       1.68 m² / 21.47 kg/m²  ________________________________________________________________________________________  Referring Physician:    [id number] Marcin Shaw  Interpreting Physician: Dao Kohler M.D.  Primary Sonographer:    Samy Valero [facility]    CPT:               ECHO TTE WO CON COMP W DOPP - 08974.m; MYOCARDIAL STRAIN                     IMAGING - 86401.m  Indication(s):     Atherosclerotic heart disease of native coronary artery                     without angina pectoris - I25.10  Procedure:         Transthoracic echocardiogram with 2-D, M-mode and complete                     spectral and color flow Doppler. Strain imaging performed for                     evaluation of regional and global myocardial shape and                     dimensions.  Ordering Location: [facility]  Study Information: Image quality for this study is good.    _______________________________________________________________________________________  CONCLUSIONS:      1. Normal left ventricular cavity size. The left ventricular wall thickness is normal. The left ventricular systolic function is mildly decreased with an ejection fraction of 46 % by Severino's method of disks. There are regional wall motion abnormalities.   2. Multiple segmental abnormalities exist. See findings.      Multiple segmental abnormalities exist. See findings.   3. Normal atria.   4. Normal right ventricular cavity size, normal right ventricular wall thickness and borderline reduced right ventricular systolic function. The tricuspid annular plane systolic excursion (TAPSE) is 1.6 cm (normal >=1.7 cm).   5. No significant valvular disease.   6. No pericardial effusion seen.   7. Compared to the transthoracic echocardiogram performed on 4/23/2023 LVEF is improved. TR is reduced.   8. Estimated pulmonary artery systolic pressure is 26 mmHg.   9. Left ventricular global longitudinal strain is 15.6 % is abnormal (> -16%). Images were acquired on a Griffin ultrasound system and processed using [employer] strain analysis software with a heart rate of 60 bpm and a blood pressure of 107/66 mmHg.  10. There is normal LV mass and normal geometry.    ________________________________________________________________________________________  FINDINGS:     Left Ventricle:  Normal left ventricular cavity size. The left ventricular wall thickness is normal. The left ventricular systolic function is mildly decreased with a calculated ejection fraction of 46 % by the Severino's biplane method of disks. There are regional wall motion abnormalities consistent with ischemic heart disease. There is mild (grade 1) left ventricular diastolic dysfunction, with normal filling pressure. There is normal LV mass and normalgeometry. Left ventricular global longitudinal strain is 15.6 % is abnormal (> -16%). Images were acquired on a Griffin ultrasound system and processed using [employer] strain analysis software with a heart rate of 60 bpm and a blood pressure of 107/66 mmHg.  LV Wall Scoring: The entire inferior septum, entire inferior wall, mid and  apical anterior wall, mid inferolateral segment, and apex are hypokinetic. All  remaining scored segments are normal.     OLD LV WALL SCORING TEXT: The RCA distribution, entire inferior septum, mid and apical anterior wall, mid inferolateral segment, and apex are hypokinetic. All remaining scored segments are normal.     Right Ventricle:  Normal right ventricular cavity size, normal wall thickness and borderline reduced right ventricular systolic function. Tricuspid annular plane systolic excursion (TAPSE) is 1.6 cm (normal >=1.7 cm).     Left Atrium:  The left atrium is normal in size.     Right Atrium:  The right atrium is normal in size.     Aortic Valve:  The aortic valve is tricuspid with normal structure without stenosis. There is no evidence of aortic regurgitation.     Mitral Valve:  Structurally normal mitral valve with normal leaflet excursion. There is trace mitral regurgitation.     Tricuspid Valve:  Structurally normal tricuspid valve with normal leaflet excursion. There is trace tricuspid regurgitation. Estimated pulmonary artery systolic pressure is 26 mmHg.     Pulmonic Valve:  Structurally normal pulmonic valve with normal leaflet excursion. There is trace pulmonic regurgitation.     Aorta:  The aortic annulus and aortic root appear normal in size. The aortic root diameter is normal.     Pericardium:  No pericardial effusion seen.     Systemic Veins:  The inferior vena cava is normalin size (normal <2.1cm) with normal inspiratory collapse (normal >50%) consistent with normal right atrial pressure (~3, range 0-5mmHg).  ____________________________________________________________________  Quantitative Data:  Left Ventricle Measurements: (Indexed to BSA)     IVSd (2D):   1.0 cm  LVPWd (2D):  0.8 cm                           Strain Measurements:  LVIDd (2D):  4.8 cm                           Global Pk Long Strain:  -15.6 %  LVIDs (2D):  3.5 cm                           Endo Pk Strain A4C:     -14.5 %  LV Mass:     141 g  83.8 g/m²                 Endo Pk Strain A2C:     -15.6 %  BiPlane LV EF%: 46 %                          Endo Pk Strain A3C:     -16.6 %     MV E Vmax:    0.48 m/s  MV A Vmax:    0.69 m/s  MV E/A:       0.70  e' lateral:   10.90 cm/s  e' medial:    6.74 cm/s  E/e' lateral: 4.42  E/e' medial:  7.15  E/e' Average: 5.46  MV DT:        248 msec    Aorta Measurements:     Ao Root: 3.5 cm       Left Atrium Measurements: (Indexed to BSA)  LA Diam 2D: 3.80 cm    Right Ventricle Measurements:     TAPSE: 1.6 cm       LVOT / RVOT/ Qp/Qs Data: (Indexed to BSA)  LVOT Vmax: 0.95 m/s  LVOT VTI:  18.50 cm    Mitral Valve Measurements:     MV E Vmax: 0.5 m/s  MV A Vmax: 0.7 m/s  MV E/A:    0.7       Tricuspid Valve Measurements:     TR Vmax:          2.4 m/s  TR Peak Gradient: 22.8 mmHg  RA Pressure:      3 mmHg  PASP:             26 mmHg    ________________________________________________________________________________________  Electronically signed on 8/1/2023at 2:42:55 PM by Dao Kohler M.D.         *** Final ***

## 2025-01-29 NOTE — DISCHARGE NOTE PROVIDER - NSDCMRMEDTOKEN_GEN_ALL_CORE_FT
atorvastatin 80 mg oral tablet: 1 orally once a day (at bedtime)  Farxiga 10 mg oral tablet: 1 tab(s) orally once a day  losartan 25 mg oral tablet: 1 tab(s) orally once a day  metFORMIN 850 mg oral tablet: 1 tab(s) orally once a day  metoprolol succinate 50 mg oral tablet, extended release: 1 tab(s) orally once a day  nicotine 14 mg/24 hr transdermal film, extended release: 1 patch transdermally once a day  pantoprazole 40 mg oral delayed release tablet: 1 tab(s) orally 2 times a day  spironolactone 25 mg oral tablet: 1 tab(s) orally once a day

## 2025-01-29 NOTE — DISCHARGE NOTE NURSING/CASE MANAGEMENT/SOCIAL WORK - FLU SEASON?
----- Message from Marbella Zavala sent at 7/18/2022  7:01 AM CDT -----  Pt has a mouth ulsar, started 2 weeks ago. Would like to be seen.   [phone] or [phone]     Yes...

## 2025-01-29 NOTE — DISCHARGE NOTE NURSING/CASE MANAGEMENT/SOCIAL WORK - PATIENT PORTAL LINK FT
You can access the FollowMyHealth Patient Portal offered by [facility] by registering at the following website: [email or web address]. By joining [employer]’s FollowMyHealth portal, you will also be able to view your health information using other applications (apps) compatible with our system.

## 2025-01-29 NOTE — DISCHARGE NOTE PROVIDER - CARE PROVIDER_API CALL
Porter Sanders  Gastroenterology  [address] [place] [id number]  Phone: [phone]  Fax: [phone]  Follow Up Time:

## 2025-01-29 NOTE — CASE MANAGEMENT PROGRESS NOTE - NSCMPROGRESSNOTE_GEN_ALL_CORE
Discussed pt in rounds, plan for EGD today. Per MD plan for transition home pending EGD results. Spouse to transport when d/c confirmed.

## 2025-01-29 NOTE — PROGRESS NOTE ADULT - NS ATTEND AMEND GEN_ALL_CORE FT
Pt is a 63 y/o male with PMHX HTN, HLD, HFrEF, CAD s/p PCI with 2 stents (on ASA and Plavix), and DM presents today due to epigastric abdominal pain and melena.     - Patient presents with significant anemia with a hemoglobin of 4.8 on presentation likely secondary to a GI bleed given his melena.  - His last PCI was in 2023.  Hold Plavix.  Would resume his aspirin as soon as possible.  - Transfuse PRBCs to goal of hemoglobin greater than 8.  - For EGD today. Optimized from a cardiac standpoint for emergent low risk procedure.  - Appears to be compensated from heart failure standpoint.  - Echo from 8/2023 shows mild to moderate ischemic cardiomyopathy.  - Monitor for signs of volume overload.  Most likely would give 20 of Lasix after his next PRBC infusion.  - No signs of acute ischemia.  Troponins are negative.  - Continue statin therapy.  - Continue Toprol.  Continue his losartan.  Continue his Aldactone.  Would hold his Farxiga for now.  Monitor and trend his creatinine.  - Will follow-up with you .

## 2025-01-29 NOTE — PATIENT PROFILE ADULT - FALL HARM RISK - RISK INTERVENTIONS

## 2025-01-29 NOTE — PROGRESS NOTE ADULT - ASSESSMENT
63 y/o male with PMHX HTN, HLD, HFrEF, CAD s/p PCI with 2 stents (on ASA and Plavix), and DM presents today due to epigastric abdominal pain and melena.     - Patient presents with significant anemia with a hemoglobin of 4.8 on presentation likely secondary to a GI bleed given his melena  - S/P 2U pRBC  - Pt on DAPT w/ ASA and plavix S/P PCI 2023   - Would stop his Plavix.  - consider ASA only once medically optimized  - Transfuse PRBCs to goal of hemoglobin greater than 8.  - GI consult for EGD, being planned for tomorrow   - Pt has no active ischemia, decompensated heart failure, unstable arrythmia, or severe stenotic valvular disease. Patient is optimized from cardiovascular standpoint to proceed with planned procedure with routine hemodynamic monitoring.     - EKG: NSR  - Troponin: <-6.6  - No evidence of any active ischemia   - Continue statin   - Tele w/ SR 40-80s mostly on low side 40-50s. Can d/c telemetry     - Echo from 08/23 with LVEF 43%  - No evidence of any meaningful volume overload   - repeat TTE, follow up results   - BP 90-100s   - on home HFrEF GDMT meds: Losartan 25, Toprol 50 spironolactone 25   - Holding farxiga  - Strict I/Os, daily weights    - Monitor and replete lytes, keep K>4, Mg>2.  - Will continue to follow.    Ash Gordon, MS FNP, AGACNP  Nurse Practitioner- Cardiology   Please call on TEAMS        
 65 y/o male with PMHX HTN, HLD, HFrEF, CAD s/p PCI with 2 stents (on ASA and Plavix), and DM presents today due to epigastric abdominal pain admitted for anemia 2/2 peptic ulcer disease.

## 2025-01-29 NOTE — DISCHARGE NOTE PROVIDER - NSDCCPCAREPLAN_GEN_ALL_CORE_FT
PRINCIPAL DISCHARGE DIAGNOSIS  Diagnosis: PUD (peptic ulcer disease)  Assessment and Plan of Treatment: You underwent EGD which was significant for multiple duodenal ulcers. You did not have any active bleeding noted at time of EGD. Please continue protonix 40mg twice a day. Please follow up with GI within 2 weeks of discharge. Please STOP smoking. STOP plavix. HOLD aspirin for now- you may resume aspirin starting 2/7/25. If experiencing severe abdominal pain, please return to the Emergency Department. Avoid alcohol and spicy foods.      SECONDARY DISCHARGE DIAGNOSES  Diagnosis: Anemia  Assessment and Plan of Treatment: Likely due to GI bleed. Please continue protonix 40mg twice a day as prescribed.    Diagnosis: Type 2 diabetes mellitus  Assessment and Plan of Treatment: Continue home medications    Diagnosis: CAD (coronary artery disease)  Assessment and Plan of Treatment: STOP plavix. HOLD aspirin for now- you may resume aspirin starting 2/7/25. Follow up with Cardiology as outpatient for TTE (transthoracic echocardiogram).     PRINCIPAL DISCHARGE DIAGNOSIS  Diagnosis: PUD (peptic ulcer disease)  Assessment and Plan of Treatment: You underwent EGD which was significant for multiple duodenal ulcers. You did not have any active bleeding noted at time of EGD. Please continue protonix 40mg twice a day. Please follow up with GI within 2 weeks of discharge. Please STOP smoking. STOP plavix. HOLD aspirin for now- you may resume aspirin starting 2/7/25. If experiencing severe abdominal pain, please return to the Emergency Department. Avoid alcohol and spicy foods. Avoid taking any NSAIDs (aleve, motrin, ibuprofen)- can take tylenol as needed for pain.      SECONDARY DISCHARGE DIAGNOSES  Diagnosis: Anemia  Assessment and Plan of Treatment: Likely due to GI bleed. Please continue protonix 40mg twice a day as prescribed.    Diagnosis: Type 2 diabetes mellitus  Assessment and Plan of Treatment: Continue home medications    Diagnosis: CAD (coronary artery disease)  Assessment and Plan of Treatment: STOP plavix. HOLD aspirin for now- you may resume aspirin starting 2/7/25. Follow up with Cardiology as outpatient for TTE (transthoracic echocardiogram).

## 2025-01-29 NOTE — DISCHARGE NOTE PROVIDER - NSDCFUSCHEDAPPT_GEN_ALL_CORE_FT
[facility]  ECHOCARD [address]  Scheduled Appointment: 03/18/2025    Michelle Aguilera  [facility]  CARDIOLOGY [address]  Scheduled Appointment: 03/20/2025    [facility]  INTMED  [facility]   Scheduled Appointment: 03/24/2025

## 2025-01-29 NOTE — DISCHARGE NOTE NURSING/CASE MANAGEMENT/SOCIAL WORK - NSDCPEFALRISK_GEN_ALL_CORE
For information on Fall & Injury Prevention, visit: [email or web address] OR  [email or web address] OR  https://www.cdc.gov/steadi/patient.html

## 2025-01-29 NOTE — DISCHARGE NOTE PROVIDER - HOSPITAL COURSE
ADMISSION DATE:  01-28-25    ---  FROM ADMISSION H+P:   HPI:  Pt is a 63 y/o male with PMHX HTN, HLD, HFrEF, CAD s/p PCI with 2 stents (on ASA and Plavix), and DM presents today due to epigastric abdominal pain. Pt states he has had worsening abdominal pain for about 1 week and noticed his stool was harder than usual and he was having more gas. Pt started to take Metamucil with some relief and started eating prunes. Pt denies any blood in his stool. Pt states that pain was only getting worse and on Saturday he was diaphoretic. Pt also took some Gas-X with some relief of symptoms. Today pt said he noticed his stool was darker than usual and his pain got worse and he was dizzy and nauseous so he came to the ED. Pt currently has no nausea or dizziness just admits to mild epigastric pain 4/10 improved from 8/10. Pt denies HA, diarrhea, chest pain, palpitations.       ED Course:   Vitals: BP:166/90 , HR:99, Temp:98F , RR:19, SpO2: 99% on RA  Labs: Na 133, Co2 21, Glucose 131, aPTT 23.2, WBC 12.26, Hgb 4.8, Hct 15.4, FOBT+  CT Angio A/P: No aortic dissection. No evidence of acute gastrointestinal hemorrhage. Proximal duodenal wall thickening/edema. Differential diagnostic considerations include duodenitis and peptic ulcer disease. Follow-up   upper endoscopy is recommended for further evaluation  US Abdomen RUQ: No cholelithiasis or secondary signs of acute cholecystitis.    Received in the ED:  Ofirmev 1g IVPB x1, 1lNS bolus x1, Morphine 4mg IVP x1, 1 unit pRBCs, Protonix 40mg IVP x1    (28 Jan 2025 04:03)      ---  HOSPITAL COURSE/PERTINENT LABS/PROCEDURES PERFORMED/PENDING TESTS:  Patient admitted to medicine for GI bleed. Patient transfused 4 units PRBCs with improvement of Hgb. Patient underwent EGD with GI- showing multiple duodenal ulcers- no active bleeding noted. Patient tolerated diet, medically stable for discharge. Patient was evaluated by Cardiology during hospital stay for clearance for EGD- no need for DAPT, can resume ASA when cleared by GI- 8 days after EGD (2/7/25).      ---  PATIENT CONDITION:  - stable    ---  PHYSICAL EXAM ON DAY OF DISCHARGE:  GENERAL: NAD, laying comfortably in bed  HEENT:  anicteric, moist mucous membranes  CHEST/LUNG:  CTA b/l, no rales, wheezes, or rhonchi  HEART:  RRR, S1, S2  ABDOMEN:  nontender to palpation, BS+, soft, nondistended  EXTREMITIES: no edema, cyanosis, or calf tenderness  NERVOUS SYSTEM: answers questions and follows commands appropriately    ---  CONSULTANTS:   Dr. Myers (GI)  Dr. Abel (Cardio)    ---  TIME SPENT:  I, the attending physician, was physically present for the key portions of the evaluation and management (E/M) service provided. The total amount of time spent reviewing the hospital notes, laboratory values, imaging findings, assessing/counseling the patient, discussing with consultant physicians, social work, nursing staff was 46 minutes ADMISSION DATE:  01-28-25    ---  FROM ADMISSION H+P:   HPI:  Pt is a 65 y/o male with PMHX HTN, HLD, HFrEF, CAD s/p PCI with 2 stents (on ASA and Plavix), and DM presents today due to epigastric abdominal pain. Pt states he has had worsening abdominal pain for about 1 week and noticed his stool was harder than usual and he was having more gas. Pt started to take Metamucil with some relief and started eating prunes. Pt denies any blood in his stool. Pt states that pain was only getting worse and on Saturday he was diaphoretic. Pt also took some Gas-X with some relief of symptoms. Today pt said he noticed his stool was darker than usual and his pain got worse and he was dizzy and nauseous so he came to the ED. Pt currently has no nausea or dizziness just admits to mild epigastric pain 4/10 improved from 8/10. Pt denies HA, diarrhea, chest pain, palpitations.       ED Course:   Vitals: BP:166/90 , HR:99, Temp:98F , RR:19, SpO2: 99% on RA  Labs: Na 133, Co2 21, Glucose 131, aPTT 23.2, WBC 12.26, Hgb 4.8, Hct 15.4, FOBT+  CT Angio A/P: No aortic dissection. No evidence of acute gastrointestinal hemorrhage. Proximal duodenal wall thickening/edema. Differential diagnostic considerations include duodenitis and peptic ulcer disease. Follow-up   upper endoscopy is recommended for further evaluation  US Abdomen RUQ: No cholelithiasis or secondary signs of acute cholecystitis.    Received in the ED:  Ofirmev 1g IVPB x1, 1lNS bolus x1, Morphine 4mg IVP x1, 1 unit pRBCs, Protonix 40mg IVP x1    (28 Jan 2025 04:03)      ---  HOSPITAL COURSE/PERTINENT LABS/PROCEDURES PERFORMED/PENDING TESTS:  Patient admitted to medicine for GI bleed. Patient transfused 4 units PRBCs with improvement of Hgb. Patient underwent EGD with GI- showing multiple duodenal ulcers- no active bleeding noted. Patient tolerated diet, medically stable for discharge. Patient was evaluated by Cardiology during hospital stay for clearance for EGD- no need for DAPT, can resume ASA when cleared by GI- 8 days after EGD (2/7/25).      Patient seen and examined at bedside. States he is feeling well, eager to go home. Advised patient to avoid NSAIDs. Stop EtOH and smoking. Discussed with cardio- can f/u TTE as outpatient. Stop DAPT- can restart ASA on 2/7/25. Patient and family agreeable with discharge plan.    ---  PATIENT CONDITION:  - stable    ---  PHYSICAL EXAM ON DAY OF DISCHARGE:  GENERAL: NAD, laying comfortably in bed  HEENT:  anicteric, moist mucous membranes  CHEST/LUNG:  CTA b/l, no rales, wheezes, or rhonchi  HEART:  RRR, S1, S2  ABDOMEN:  nontender to palpation, BS+, soft, nondistended  EXTREMITIES: no edema, cyanosis, or calf tenderness  NERVOUS SYSTEM: answers questions and follows commands appropriately    ---  CONSULTANTS:   Dr. Myers (GI)  Dr. Abel (Cardio)    ---  TIME SPENT:  I, the attending physician, was physically present for the key portions of the evaluation and management (E/M) service provided. The total amount of time spent reviewing the hospital notes, laboratory values, imaging findings, assessing/counseling the patient, discussing with consultant physicians, social work, nursing staff was 46 minutes

## 2025-01-29 NOTE — PATIENT PROFILE ADULT - FUNCTIONAL SCREEN CURRENT LEVEL: SWALLOWING (IF SCORE 2 OR MORE FOR ANY ITEM, CONSULT REHAB SERVICES), MLM)
[facility]/Population Health Chart Review & Patient Outreach Details For Medication Adherence Project    Reason for Outreach Encounter: 3rd Party payor non-compliance report ([employer], [employer], [employer], etc)  2.  Patient Outreach Method: Reviewed patient chart   3.   Medication in question:             losartan  last filled  10/17 for 90 day supply    4.  Reviewed and or Updates Made To: Patient Chart  5. Outreach Outcomes and/or actions taken: Patient filled medication and is on track to be adherent  Additional Notes:        0 = swallows foods/liquids without difficulty

## 2025-01-30 ENCOUNTER — NON-APPOINTMENT (OUTPATIENT)
Age: 65
End: 2025-01-30

## 2025-01-31 ENCOUNTER — OUTPATIENT (OUTPATIENT)
Dept: OUTPATIENT SERVICES | Facility: HOSPITAL | Age: 65
LOS: 1 days | End: 2025-01-31

## 2025-01-31 ENCOUNTER — APPOINTMENT (OUTPATIENT)
Dept: INTERNAL MEDICINE | Facility: CLINIC | Age: 65
End: 2025-01-31

## 2025-01-31 VITALS
HEIGHT: 66 IN | DIASTOLIC BLOOD PRESSURE: 68 MMHG | WEIGHT: 129 LBS | OXYGEN SATURATION: 94 % | BODY MASS INDEX: 20.73 KG/M2 | SYSTOLIC BLOOD PRESSURE: 116 MMHG | HEART RATE: 54 BPM

## 2025-01-31 DIAGNOSIS — I50.20 UNSPECIFIED SYSTOLIC (CONGESTIVE) HEART FAILURE: ICD-10-CM

## 2025-01-31 DIAGNOSIS — K27.9 PEPTIC ULCER, SITE UNSPECIFIED, UNSPECIFIED AS ACUTE OR CHRONIC, W/OUT HEMORRHAGE OR PERFORATION: ICD-10-CM

## 2025-01-31 DIAGNOSIS — Z00.00 ENCOUNTER FOR GENERAL ADULT MEDICAL EXAMINATION W/OUT ABNORMAL FINDINGS: ICD-10-CM

## 2025-01-31 DIAGNOSIS — E11.9 TYPE 2 DIABETES MELLITUS W/OUT COMPLICATIONS: ICD-10-CM

## 2025-01-31 DIAGNOSIS — I25.10 ATHEROSCLEROTIC HEART DISEASE OF NATIVE CORONARY ARTERY W/OUT ANGINA PECTORIS: ICD-10-CM

## 2025-01-31 DIAGNOSIS — D62 ACUTE POSTHEMORRHAGIC ANEMIA: ICD-10-CM

## 2025-01-31 LAB
HCT VFR BLD CALC: 32.1 %
HGB BLD-MCNC: 9.8 G/DL
MCHC RBC-ENTMCNC: 24.9 PG
MCHC RBC-ENTMCNC: 30.5 G/DL
MCV RBC AUTO: 81.7 FL
PLATELET # BLD AUTO: 472 K/UL
RBC # BLD: 3.93 M/UL
RBC # FLD: 22.7 %
WBC # FLD AUTO: 10.79 K/UL

## 2025-01-31 PROCEDURE — 99213 OFFICE O/P EST LOW 20 MIN: CPT | Mod: GE

## 2025-01-31 PROCEDURE — G0463: CPT

## 2025-01-31 RX ORDER — PANTOPRAZOLE SODIUM 40 MG/1
40 TABLET, DELAYED RELEASE ORAL TWICE DAILY
Refills: 0 | Status: ACTIVE | COMMUNITY
Start: 2025-01-30

## 2025-01-31 RX ORDER — ASPIRIN ENTERIC COATED TABLETS 81 MG 81 MG/1
81 TABLET, DELAYED RELEASE ORAL
Qty: 90 | Refills: 3 | Status: ACTIVE | COMMUNITY
Start: 2025-01-31

## 2025-02-18 DIAGNOSIS — I10 ESSENTIAL (PRIMARY) HYPERTENSION: ICD-10-CM

## 2025-03-03 PROCEDURE — 71275 CT ANGIOGRAPHY CHEST: CPT | Mod: MC

## 2025-03-03 PROCEDURE — 74174 CTA ABD&PLVS W/CONTRAST: CPT | Mod: MC

## 2025-03-03 PROCEDURE — 83735 ASSAY OF MAGNESIUM: CPT

## 2025-03-03 PROCEDURE — 80048 BASIC METABOLIC PNL TOTAL CA: CPT

## 2025-03-03 PROCEDURE — 80053 COMPREHEN METABOLIC PANEL: CPT

## 2025-03-03 PROCEDURE — 99285 EMERGENCY DEPT VISIT HI MDM: CPT | Mod: 25

## 2025-03-03 PROCEDURE — 85730 THROMBOPLASTIN TIME PARTIAL: CPT

## 2025-03-03 PROCEDURE — 36430 TRANSFUSION BLD/BLD COMPNT: CPT

## 2025-03-03 PROCEDURE — 82272 OCCULT BLD FECES 1-3 TESTS: CPT

## 2025-03-03 PROCEDURE — 85610 PROTHROMBIN TIME: CPT

## 2025-03-03 PROCEDURE — 86850 RBC ANTIBODY SCREEN: CPT

## 2025-03-03 PROCEDURE — 86900 BLOOD TYPING SEROLOGIC ABO: CPT

## 2025-03-03 PROCEDURE — 84484 ASSAY OF TROPONIN QUANT: CPT

## 2025-03-03 PROCEDURE — 71045 X-RAY EXAM CHEST 1 VIEW: CPT

## 2025-03-03 PROCEDURE — P9016: CPT

## 2025-03-03 PROCEDURE — 88312 SPECIAL STAINS GROUP 1: CPT

## 2025-03-03 PROCEDURE — 86901 BLOOD TYPING SEROLOGIC RH(D): CPT

## 2025-03-03 PROCEDURE — 76705 ECHO EXAM OF ABDOMEN: CPT

## 2025-03-03 PROCEDURE — 36415 COLL VENOUS BLD VENIPUNCTURE: CPT

## 2025-03-03 PROCEDURE — 84100 ASSAY OF PHOSPHORUS: CPT

## 2025-03-03 PROCEDURE — 88305 TISSUE EXAM BY PATHOLOGIST: CPT

## 2025-03-03 PROCEDURE — 85018 HEMOGLOBIN: CPT

## 2025-03-03 PROCEDURE — 82962 GLUCOSE BLOOD TEST: CPT

## 2025-03-03 PROCEDURE — 96374 THER/PROPH/DIAG INJ IV PUSH: CPT

## 2025-03-03 PROCEDURE — 83690 ASSAY OF LIPASE: CPT

## 2025-03-03 PROCEDURE — 86923 COMPATIBILITY TEST ELECTRIC: CPT

## 2025-03-03 PROCEDURE — 83036 HEMOGLOBIN GLYCOSYLATED A1C: CPT

## 2025-03-03 PROCEDURE — 85025 COMPLETE CBC W/AUTO DIFF WBC: CPT

## 2025-03-03 PROCEDURE — 93005 ELECTROCARDIOGRAM TRACING: CPT

## 2025-03-03 PROCEDURE — 85014 HEMATOCRIT: CPT

## 2025-03-12 RX ORDER — METFORMIN HYDROCHLORIDE 1000 MG/1
1000 TABLET, COATED ORAL
Qty: 90 | Refills: 3 | Status: ACTIVE | COMMUNITY
Start: 2025-03-12 | End: 1900-01-01

## 2025-03-18 ENCOUNTER — APPOINTMENT (OUTPATIENT)
Dept: CV DIAGNOSITCS | Facility: HOSPITAL | Age: 65
End: 2025-03-18

## 2025-03-18 ENCOUNTER — OUTPATIENT (OUTPATIENT)
Dept: OUTPATIENT SERVICES | Facility: HOSPITAL | Age: 65
LOS: 1 days | End: 2025-03-18
Payer: SELF-PAY

## 2025-03-18 ENCOUNTER — RESULT REVIEW (OUTPATIENT)
Age: 65
End: 2025-03-18

## 2025-03-18 DIAGNOSIS — K27.9 PEPTIC ULCER, SITE UNSPECIFIED, UNSPECIFIED AS ACUTE OR CHRONIC, WITHOUT HEMORRHAGE OR PERFORATION: ICD-10-CM

## 2025-03-18 DIAGNOSIS — E11.9 TYPE 2 DIABETES MELLITUS WITHOUT COMPLICATIONS: ICD-10-CM

## 2025-03-18 DIAGNOSIS — I50.20 UNSPECIFIED SYSTOLIC (CONGESTIVE) HEART FAILURE: ICD-10-CM

## 2025-03-18 DIAGNOSIS — I25.10 ATHEROSCLEROTIC HEART DISEASE OF NATIVE CORONARY ARTERY WITHOUT ANGINA PECTORIS: ICD-10-CM

## 2025-03-18 PROCEDURE — 93306 TTE W/DOPPLER COMPLETE: CPT

## 2025-03-18 PROCEDURE — 93306 TTE W/DOPPLER COMPLETE: CPT | Mod: 26

## 2025-03-20 ENCOUNTER — APPOINTMENT (OUTPATIENT)
Dept: CARDIOLOGY | Facility: HOSPITAL | Age: 65
End: 2025-03-20

## 2025-03-20 ENCOUNTER — OUTPATIENT (OUTPATIENT)
Dept: OUTPATIENT SERVICES | Facility: HOSPITAL | Age: 65
LOS: 1 days | End: 2025-03-20
Payer: SELF-PAY

## 2025-03-20 ENCOUNTER — NON-APPOINTMENT (OUTPATIENT)
Age: 65
End: 2025-03-20

## 2025-03-20 VITALS
HEART RATE: 65 BPM | BODY MASS INDEX: 21.69 KG/M2 | OXYGEN SATURATION: 100 % | HEIGHT: 66 IN | WEIGHT: 135 LBS | SYSTOLIC BLOOD PRESSURE: 127 MMHG | DIASTOLIC BLOOD PRESSURE: 78 MMHG

## 2025-03-20 DIAGNOSIS — D62 ACUTE POSTHEMORRHAGIC ANEMIA: ICD-10-CM

## 2025-03-20 PROCEDURE — G0463: CPT

## 2025-03-20 PROCEDURE — 93005 ELECTROCARDIOGRAM TRACING: CPT

## 2025-03-24 ENCOUNTER — OUTPATIENT (OUTPATIENT)
Dept: OUTPATIENT SERVICES | Facility: HOSPITAL | Age: 65
LOS: 1 days | End: 2025-03-24
Payer: SELF-PAY

## 2025-03-24 ENCOUNTER — APPOINTMENT (OUTPATIENT)
Dept: INTERNAL MEDICINE | Facility: CLINIC | Age: 65
End: 2025-03-24
Payer: COMMERCIAL

## 2025-03-24 VITALS
BODY MASS INDEX: 21.69 KG/M2 | OXYGEN SATURATION: 98 % | SYSTOLIC BLOOD PRESSURE: 120 MMHG | WEIGHT: 135 LBS | DIASTOLIC BLOOD PRESSURE: 70 MMHG | HEART RATE: 79 BPM | HEIGHT: 66 IN

## 2025-03-24 DIAGNOSIS — E11.9 TYPE 2 DIABETES MELLITUS W/OUT COMPLICATIONS: ICD-10-CM

## 2025-03-24 DIAGNOSIS — I10 ESSENTIAL (PRIMARY) HYPERTENSION: ICD-10-CM

## 2025-03-24 DIAGNOSIS — I50.20 UNSPECIFIED SYSTOLIC (CONGESTIVE) HEART FAILURE: ICD-10-CM

## 2025-03-24 DIAGNOSIS — K27.9 PEPTIC ULCER, SITE UNSPECIFIED, UNSPECIFIED AS ACUTE OR CHRONIC, W/OUT HEMORRHAGE OR PERFORATION: ICD-10-CM

## 2025-03-24 DIAGNOSIS — I25.10 ATHEROSCLEROTIC HEART DISEASE OF NATIVE CORONARY ARTERY W/OUT ANGINA PECTORIS: ICD-10-CM

## 2025-03-24 PROCEDURE — G0463: CPT

## 2025-03-24 PROCEDURE — G2211 COMPLEX E/M VISIT ADD ON: CPT

## 2025-03-24 PROCEDURE — 99214 OFFICE O/P EST MOD 30 MIN: CPT | Mod: GC

## 2025-03-25 ENCOUNTER — NON-APPOINTMENT (OUTPATIENT)
Age: 65
End: 2025-03-25

## 2025-03-25 DIAGNOSIS — E11.9 TYPE 2 DIABETES MELLITUS WITHOUT COMPLICATIONS: ICD-10-CM

## 2025-03-25 DIAGNOSIS — I50.20 UNSPECIFIED SYSTOLIC (CONGESTIVE) HEART FAILURE: ICD-10-CM

## 2025-03-25 DIAGNOSIS — D62 ACUTE POSTHEMORRHAGIC ANEMIA: ICD-10-CM

## 2025-04-01 DIAGNOSIS — I25.10 ATHEROSCLEROTIC HEART DISEASE OF NATIVE CORONARY ARTERY WITHOUT ANGINA PECTORIS: ICD-10-CM

## 2025-04-01 DIAGNOSIS — I50.20 UNSPECIFIED SYSTOLIC (CONGESTIVE) HEART FAILURE: ICD-10-CM

## 2025-04-01 DIAGNOSIS — E11.9 TYPE 2 DIABETES MELLITUS WITHOUT COMPLICATIONS: ICD-10-CM

## 2025-04-01 DIAGNOSIS — K27.9 PEPTIC ULCER, SITE UNSPECIFIED, UNSPECIFIED AS ACUTE OR CHRONIC, WITHOUT HEMORRHAGE OR PERFORATION: ICD-10-CM

## 2025-04-08 ENCOUNTER — APPOINTMENT (OUTPATIENT)
Dept: INTERNAL MEDICINE | Facility: CLINIC | Age: 65
End: 2025-04-08

## 2025-06-05 ENCOUNTER — OUTPATIENT (OUTPATIENT)
Dept: OUTPATIENT SERVICES | Facility: HOSPITAL | Age: 65
LOS: 1 days | End: 2025-06-05
Payer: SELF-PAY

## 2025-06-05 ENCOUNTER — NON-APPOINTMENT (OUTPATIENT)
Age: 65
End: 2025-06-05

## 2025-06-05 ENCOUNTER — APPOINTMENT (OUTPATIENT)
Dept: CARDIOLOGY | Facility: HOSPITAL | Age: 65
End: 2025-06-05

## 2025-06-05 VITALS
SYSTOLIC BLOOD PRESSURE: 118 MMHG | HEART RATE: 89 BPM | BODY MASS INDEX: 21.79 KG/M2 | OXYGEN SATURATION: 98 % | WEIGHT: 135 LBS | DIASTOLIC BLOOD PRESSURE: 73 MMHG

## 2025-06-05 DIAGNOSIS — E11.9 TYPE 2 DIABETES MELLITUS W/OUT COMPLICATIONS: ICD-10-CM

## 2025-06-05 DIAGNOSIS — I25.10 ATHEROSCLEROTIC HEART DISEASE OF NATIVE CORONARY ARTERY WITHOUT ANGINA PECTORIS: ICD-10-CM

## 2025-06-05 DIAGNOSIS — I25.10 ATHEROSCLEROTIC HEART DISEASE OF NATIVE CORONARY ARTERY W/OUT ANGINA PECTORIS: ICD-10-CM

## 2025-06-05 DIAGNOSIS — Z00.00 ENCOUNTER FOR GENERAL ADULT MEDICAL EXAMINATION W/OUT ABNORMAL FINDINGS: ICD-10-CM

## 2025-06-05 DIAGNOSIS — I50.20 UNSPECIFIED SYSTOLIC (CONGESTIVE) HEART FAILURE: ICD-10-CM

## 2025-06-05 PROCEDURE — G0463: CPT

## 2025-06-05 PROCEDURE — 93005 ELECTROCARDIOGRAM TRACING: CPT

## 2025-06-06 DIAGNOSIS — I50.20 UNSPECIFIED SYSTOLIC (CONGESTIVE) HEART FAILURE: ICD-10-CM

## 2025-06-06 DIAGNOSIS — E11.9 TYPE 2 DIABETES MELLITUS WITHOUT COMPLICATIONS: ICD-10-CM

## 2025-06-06 DIAGNOSIS — Z00.00 ENCOUNTER FOR GENERAL ADULT MEDICAL EXAMINATION WITHOUT ABNORMAL FINDINGS: ICD-10-CM

## (undated) DEVICE — ELCTR GROUNDING PAD ADULT COVIDIEN

## (undated) DEVICE — SYR LUER LOK 30CC

## (undated) DEVICE — SOL IRR POUR H2O 1000ML

## (undated) DEVICE — TUBING SUCTION CONN 6FT STERILE

## (undated) DEVICE — NDL INJ SCLERO INTERJECT 23G

## (undated) DEVICE — FORMALIN CUPS 10% BUFFERED

## (undated) DEVICE — FORCEP RADIAL JAW 4 W NDL 2.2MM 2.8MM 160CM YELLOW DISP

## (undated) DEVICE — STERIS DEFENDO 3-PIECE KIT (AIR/WATER, SUCTION & BIOPSY VALVES)

## (undated) DEVICE — TUBE O2 SUPL CRUSH RESIS CONN SOUTHSIDE ONLY

## (undated) DEVICE — BRUSH CYTO ENDO

## (undated) DEVICE — SYR IV POSIFLUSH NS 3ML 30/TY

## (undated) DEVICE — SUCTION YANKAUER TAPERED BULBOUS NO VENT

## (undated) DEVICE — SNARE LRG

## (undated) DEVICE — TUBING IV SET SECOND 34" W/O LOK-BLUNT

## (undated) DEVICE — BRUSH COLONOSCOPY CYTOLOGY

## (undated) DEVICE — CATH IV SAFE BC 20G X 1.16" (PINK)

## (undated) DEVICE — Device

## (undated) DEVICE — CATH ELCTR GLIDE PRB 7FR

## (undated) DEVICE — CATH ELECHMSTAT  INJ 7FR 210CM

## (undated) DEVICE — TUBING CANNULA SALTER LABS NASAL ADULT 7FT

## (undated) DEVICE — TUBING IV SET GRAVITY 3Y 100" MACRO

## (undated) DEVICE — CATH IV SAFE BC 22G X 1" (BLUE)

## (undated) DEVICE — SOL IRR NS 0.9% 250ML

## (undated) DEVICE — DRSG CURITY GAUZE SPONGE 4 X 4" 12-PLY

## (undated) DEVICE — SOL IRR POUR H2O 500ML

## (undated) DEVICE — SOL IRR POUR NS 0.9% 1000ML

## (undated) DEVICE — BITE BLOCK MAXI RUBBER STAMP

## (undated) DEVICE — GLV 8 PROTEXIS (WHITE)

## (undated) DEVICE — MARKER ENDO SPOT EX

## (undated) DEVICE — SET IV PUMP BLOOD 1VALVE 180FILTER NON-DEHP

## (undated) DEVICE — SOL IRR BAG H2O 1000ML

## (undated) DEVICE — SENSOR O2 FINGER XL ADULT 24/BX 6BX/CA